# Patient Record
Sex: MALE | Race: WHITE | NOT HISPANIC OR LATINO | Employment: OTHER | URBAN - METROPOLITAN AREA
[De-identification: names, ages, dates, MRNs, and addresses within clinical notes are randomized per-mention and may not be internally consistent; named-entity substitution may affect disease eponyms.]

---

## 2017-03-17 ENCOUNTER — DOCUMENTATION ONLY (OUTPATIENT)
Dept: FAMILY MEDICINE | Facility: CLINIC | Age: 77
End: 2017-03-17

## 2017-03-17 NOTE — PROGRESS NOTES
Pre-Visit Chart Review  For Appointment Scheduled on 3/22/17.    Health Maintenance Due   Topic Date Due    TETANUS VACCINE  03[birth year]04/1958    Pneumococcal (65+) (2 of 2 - PPSV23) 09/29/2016

## 2017-03-22 ENCOUNTER — LAB VISIT (OUTPATIENT)
Dept: LAB | Facility: HOSPITAL | Age: 77
End: 2017-03-22
Attending: FAMILY MEDICINE
Payer: MEDICARE

## 2017-03-22 ENCOUNTER — OFFICE VISIT (OUTPATIENT)
Dept: FAMILY MEDICINE | Facility: CLINIC | Age: 77
End: 2017-03-22
Payer: MEDICARE

## 2017-03-22 VITALS
SYSTOLIC BLOOD PRESSURE: 118 MMHG | TEMPERATURE: 99 F | HEART RATE: 88 BPM | DIASTOLIC BLOOD PRESSURE: 81 MMHG | BODY MASS INDEX: 27.03 KG/M2 | HEIGHT: 66 IN | WEIGHT: 168.19 LBS

## 2017-03-22 DIAGNOSIS — Z23 IMMUNIZATION DUE: ICD-10-CM

## 2017-03-22 DIAGNOSIS — E78.5 HYPERLIPIDEMIA, UNSPECIFIED HYPERLIPIDEMIA TYPE: ICD-10-CM

## 2017-03-22 DIAGNOSIS — E78.2 HYPERLIPIDEMIA, MIXED: ICD-10-CM

## 2017-03-22 DIAGNOSIS — M17.0 PRIMARY OSTEOARTHRITIS OF BOTH KNEES: ICD-10-CM

## 2017-03-22 DIAGNOSIS — I10 ESSENTIAL HYPERTENSION: Primary | ICD-10-CM

## 2017-03-22 LAB
ALBUMIN SERPL BCP-MCNC: 3.9 G/DL
ALP SERPL-CCNC: 52 U/L
ALT SERPL W/O P-5'-P-CCNC: 16 U/L
ANION GAP SERPL CALC-SCNC: 10 MMOL/L
AST SERPL-CCNC: 29 U/L
BILIRUB SERPL-MCNC: 0.7 MG/DL
BUN SERPL-MCNC: 13 MG/DL
CALCIUM SERPL-MCNC: 9.3 MG/DL
CHLORIDE SERPL-SCNC: 109 MMOL/L
CHOLEST/HDLC SERPL: 3.3 {RATIO}
CO2 SERPL-SCNC: 22 MMOL/L
CREAT SERPL-MCNC: 0.9 MG/DL
EST. GFR  (AFRICAN AMERICAN): >60 ML/MIN/1.73 M^2
EST. GFR  (NON AFRICAN AMERICAN): >60 ML/MIN/1.73 M^2
GLUCOSE SERPL-MCNC: 102 MG/DL
HDL/CHOLESTEROL RATIO: 30.4 %
HDLC SERPL-MCNC: 161 MG/DL
HDLC SERPL-MCNC: 49 MG/DL
LDLC SERPL CALC-MCNC: 90.4 MG/DL
NONHDLC SERPL-MCNC: 112 MG/DL
POTASSIUM SERPL-SCNC: 4.2 MMOL/L
PROT SERPL-MCNC: 7.4 G/DL
SODIUM SERPL-SCNC: 141 MMOL/L
TRIGL SERPL-MCNC: 108 MG/DL

## 2017-03-22 PROCEDURE — 1160F RVW MEDS BY RX/DR IN RCRD: CPT | Mod: S$GLB,,, | Performed by: FAMILY MEDICINE

## 2017-03-22 PROCEDURE — 80053 COMPREHEN METABOLIC PANEL: CPT

## 2017-03-22 PROCEDURE — 3079F DIAST BP 80-89 MM HG: CPT | Mod: S$GLB,,, | Performed by: FAMILY MEDICINE

## 2017-03-22 PROCEDURE — 80061 LIPID PANEL: CPT

## 2017-03-22 PROCEDURE — 1159F MED LIST DOCD IN RCRD: CPT | Mod: S$GLB,,, | Performed by: FAMILY MEDICINE

## 2017-03-22 PROCEDURE — 1157F ADVNC CARE PLAN IN RCRD: CPT | Mod: S$GLB,,, | Performed by: FAMILY MEDICINE

## 2017-03-22 PROCEDURE — 99214 OFFICE O/P EST MOD 30 MIN: CPT | Mod: 25,S$GLB,, | Performed by: FAMILY MEDICINE

## 2017-03-22 PROCEDURE — 3074F SYST BP LT 130 MM HG: CPT | Mod: S$GLB,,, | Performed by: FAMILY MEDICINE

## 2017-03-22 PROCEDURE — 90732 PPSV23 VACC 2 YRS+ SUBQ/IM: CPT | Mod: S$GLB,,, | Performed by: FAMILY MEDICINE

## 2017-03-22 PROCEDURE — G0009 ADMIN PNEUMOCOCCAL VACCINE: HCPCS | Mod: S$GLB,,, | Performed by: FAMILY MEDICINE

## 2017-03-22 PROCEDURE — 36415 COLL VENOUS BLD VENIPUNCTURE: CPT | Mod: PO

## 2017-03-22 PROCEDURE — 1125F AMNT PAIN NOTED PAIN PRSNT: CPT | Mod: S$GLB,,, | Performed by: FAMILY MEDICINE

## 2017-03-22 PROCEDURE — 99999 PR PBB SHADOW E&M-EST. PATIENT-LVL III: CPT | Mod: PBBFAC,,, | Performed by: FAMILY MEDICINE

## 2017-03-22 RX ORDER — AMLODIPINE AND BENAZEPRIL HYDROCHLORIDE 5; 20 MG/1; MG/1
1 CAPSULE ORAL DAILY
Qty: 90 CAPSULE | Refills: 3 | Status: SHIPPED | OUTPATIENT
Start: 2017-03-22 | End: 2018-03-26 | Stop reason: SDUPTHER

## 2017-03-22 RX ORDER — SIMVASTATIN 20 MG/1
20 TABLET, FILM COATED ORAL NIGHTLY
Qty: 90 TABLET | Refills: 3 | Status: SHIPPED | OUTPATIENT
Start: 2017-03-22 | End: 2018-03-26 | Stop reason: SDUPTHER

## 2017-03-22 RX ORDER — HYDROCODONE BITARTRATE AND ACETAMINOPHEN 7.5; 325 MG/1; MG/1
TABLET ORAL
Qty: 90 TABLET | Refills: 0 | Status: SHIPPED | OUTPATIENT
Start: 2017-03-22 | End: 2017-03-22 | Stop reason: SDUPTHER

## 2017-03-22 RX ORDER — HYDROCODONE BITARTRATE AND ACETAMINOPHEN 7.5; 325 MG/1; MG/1
TABLET ORAL
Qty: 90 TABLET | Refills: 0 | Status: SHIPPED | OUTPATIENT
Start: 2017-03-22 | End: 2017-09-25 | Stop reason: SDUPTHER

## 2017-03-22 NOTE — PROGRESS NOTES
CHIEF COMPLAINT:  Follow up      HISTORY OF PRESENT ILLNESS:  Tha Guzman is a 77 y.o. male who presents to clinic for follow up    1. HTN: he is on lotrel.  He denies any cough, CP, SOB, edema    2. Hyperlipidemia:  He is on zocor and ASA. He denies any myalgia, dark colored urine.     3. Chronic knee pain/arthritis:  He was followed by Dr. Ramirez and also his previous PCP.  He states that he takes norco 3-4 times a week for severe knee pain.  He has undergone corticosteroid and synvisc/euflexxa injections in the past with no improvement. He requests that the norco be written for 1 tablet PO TID prn as this is cheaper than writing it for 1 tablet 3-4 times a week prn.     4. He previously stated that he had a pneumovax, now he states that he is not sure. He had a prevnar 13    REVIEW OF SYSTEMS:  The patient denies any fever, chills, night sweats, headaches, vision changes, difficulty speaking or swallowing, decreased hearing, weight loss, weight gain, chest pain, palpitations, shortness of breath, cough, nausea, vomiting, abdominal pain, dysuria, diarrhea, constipation, hematuria, hematochezia, melena, changes in his hair, skin, nails, numbness or weakness in his extremities, erythema, swelling  over any of his joints, myalgia, swollen glands, easy bruising, fatigue, edema.      MEDICATIONS:   Reviewed and/or reconciled in EPIC    ALLERGIES:  Reviewed and/or reconciled in [facility]    PAST MEDICAL/SURGICAL HISTORY:   Past Medical History:   Diagnosis Date    Arthritis     Elevated PSA     Essential hypertension, benign     Mixed hyperlipidemia     Prostatitis, unspecified     Wears glasses       Past Surgical History:   Procedure Laterality Date    HEMORRHOID SURGERY      KNEE ARTHROSCOPY W/ MENISCAL REPAIR Left     TONSILLECTOMY         FAMILY HISTORY:    Family History   Problem Relation Age of Onset    Cancer Brother      lunga cancer    Cancer Paternal Uncle      prostate cancer    Melanoma Neg Hx      Psoriasis Neg Hx     Lupus Neg Hx     Eczema Neg Hx        SOCIAL HISTORY:    Social History     Social History    Marital status:      Spouse name: N/A    Number of children: N/A    Years of education: N/A     Occupational History    Not on file.     Social History Main Topics    Smoking status: Former Smoker     Packs/day: 1.00     Years: 40.00     Types: Cigarettes     Quit date: 7/18/2007    Smokeless tobacco: Never Used    Alcohol use 3.0 oz/week     6 Standard drinks or equivalent per week      Comment: OCCASSIONALLY    Drug use: No    Sexual activity: Not Currently     Other Topics Concern    Not on file     Social History Narrative       PHYSICAL EXAM:  VITAL SIGNS:   There were no vitals filed for this visit.  GENERAL:  Patient appears well nourished, sitting on exam table, in no acute distress.  HEENT:  Atraumatic, normocephalic, PERRLA, EOMI, no conjunctival injection, sclerae are anicteric, normal external auditory canals,TMs clear b/l, gross hearing intact to whisper, MMM, no oropharygneal erythema or exudate.  NECK:  Supple, normal ROM, trachea is midline , no supraclavicular or cervical LAD or masses palpated.  Thyroid gland not palpable.  CARDIOVASCULAR:  RRR, normal S1 and S2, no m/r/g.  RESPIRATORY:  CTA b/l, no wheezes, rhonchi, rales.  No increased work of breathing, no  use of accessory muscles.  ABDOMEN:  Soft, nontender, nondistended, normoactive bowel sounds in all four quadrants, no rebound or guarding, no HSM or masses palpated.  Normal percussion.  EXTREMITIES:  2+ DP pulses b/l, no edema.  SKIN:  Warm, no lesions on exposed skin.  NEUROMUSCULAR:  Cranial nerves II-XII grossly intact.   No clubbing or cyanosis of digits/nails.  Steady gait.  PSYCH:  Patient is alert and oriented to person, time, place. They are appropriately dressed and groomed. There is normal eye contact. Rate and tone of speech is normal. Normal insight, judgement. Normal thought content and  process.         ASSESSMENT/PLAN: This is a 77 y.o. male who presents to clinic for evaluation of the following concerns    1. HTN: see below  2. Hyperlipidemia: CMP, lipid panel  3.Knee DJD: norco refilled, the risks/side effects of this medication were discussed with him.  He was counseled to try to wean his use of this medication, can consider referral to physical medicine for cooled radiofrequency ablation. Prescription written for 1 tablet PO TID prn, however patient takes 3-4 a week as needed.   4. Immunization due: will receive pneumovax in clinic today    Patient readiness: acceptance and barriers:none    During the course of the visit the patient was educated and counseled about the following:     Hypertension:   Medication: no change.    Goals: Hypertension: Reduce Blood Pressure    Did patient meet goals/outcomes: Yes    The following self management tools provided: declined    Patient Instructions (the written plan) was given to the patient/family.     Time spent with patient: 30 minutes        Mindi Mar MD

## 2017-04-10 ENCOUNTER — PATIENT MESSAGE (OUTPATIENT)
Dept: UROLOGY | Facility: CLINIC | Age: 77
End: 2017-04-10

## 2017-04-10 DIAGNOSIS — R31.9 BLOOD IN URINE: Primary | ICD-10-CM

## 2017-04-26 ENCOUNTER — LAB VISIT (OUTPATIENT)
Dept: LAB | Facility: HOSPITAL | Age: 77
End: 2017-04-26
Attending: UROLOGY
Payer: MEDICARE

## 2017-04-26 DIAGNOSIS — R31.9 BLOOD IN URINE: ICD-10-CM

## 2017-04-26 LAB — COMPLEXED PSA SERPL-MCNC: 4 NG/ML

## 2017-04-26 PROCEDURE — 84153 ASSAY OF PSA TOTAL: CPT

## 2017-04-26 PROCEDURE — 36415 COLL VENOUS BLD VENIPUNCTURE: CPT | Mod: PO

## 2017-05-02 ENCOUNTER — APPOINTMENT (OUTPATIENT)
Dept: LAB | Facility: HOSPITAL | Age: 77
End: 2017-05-02
Attending: UROLOGY
Payer: MEDICARE

## 2017-05-02 ENCOUNTER — PATIENT MESSAGE (OUTPATIENT)
Dept: UROLOGY | Facility: CLINIC | Age: 77
End: 2017-05-02

## 2017-05-02 ENCOUNTER — OFFICE VISIT (OUTPATIENT)
Dept: UROLOGY | Facility: CLINIC | Age: 77
End: 2017-05-02
Payer: MEDICARE

## 2017-05-02 VITALS
WEIGHT: 170 LBS | DIASTOLIC BLOOD PRESSURE: 80 MMHG | BODY MASS INDEX: 30.12 KG/M2 | SYSTOLIC BLOOD PRESSURE: 140 MMHG | RESPIRATION RATE: 18 BRPM | TEMPERATURE: 98 F | HEIGHT: 63 IN | HEART RATE: 99 BPM

## 2017-05-02 DIAGNOSIS — R31.29 MICROSCOPIC HEMATURIA: Primary | ICD-10-CM

## 2017-05-02 DIAGNOSIS — R97.20 ELEVATED PROSTATE SPECIFIC ANTIGEN (PSA): ICD-10-CM

## 2017-05-02 DIAGNOSIS — N40.0 BENIGN NON-NODULAR PROSTATIC HYPERPLASIA, PRESENCE OF LOWER URINARY TRACT SYMPTOMS UNSPECIFIED: ICD-10-CM

## 2017-05-02 PROCEDURE — 88112 CYTOPATH CELL ENHANCE TECH: CPT | Performed by: PATHOLOGY

## 2017-05-02 PROCEDURE — 1159F MED LIST DOCD IN RCRD: CPT | Mod: S$GLB,,, | Performed by: UROLOGY

## 2017-05-02 PROCEDURE — 99999 PR PBB SHADOW E&M-EST. PATIENT-LVL III: CPT | Mod: PBBFAC,,, | Performed by: UROLOGY

## 2017-05-02 PROCEDURE — 87086 URINE CULTURE/COLONY COUNT: CPT

## 2017-05-02 PROCEDURE — 3079F DIAST BP 80-89 MM HG: CPT | Mod: S$GLB,,, | Performed by: UROLOGY

## 2017-05-02 PROCEDURE — 3077F SYST BP >= 140 MM HG: CPT | Mod: S$GLB,,, | Performed by: UROLOGY

## 2017-05-02 PROCEDURE — 1160F RVW MEDS BY RX/DR IN RCRD: CPT | Mod: S$GLB,,, | Performed by: UROLOGY

## 2017-05-02 PROCEDURE — 1126F AMNT PAIN NOTED NONE PRSNT: CPT | Mod: S$GLB,,, | Performed by: UROLOGY

## 2017-05-02 PROCEDURE — 81002 URINALYSIS NONAUTO W/O SCOPE: CPT | Mod: S$GLB,,, | Performed by: UROLOGY

## 2017-05-02 PROCEDURE — 88112 CYTOPATH CELL ENHANCE TECH: CPT | Mod: 26,,, | Performed by: PATHOLOGY

## 2017-05-02 PROCEDURE — 99214 OFFICE O/P EST MOD 30 MIN: CPT | Mod: 25,S$GLB,, | Performed by: UROLOGY

## 2017-05-02 NOTE — PROGRESS NOTES
OFFICE NOTE    CHIEF COMPLAINT:  BPH, microscopic hematuria.    HISTORY OF PRESENT ILLNESS:  This 77-year-old male returns for a routine   recheck.  He has a history of BPH for which he continues to take Flomax 0.4 mg   p.o. daily with which he continues to do well.  He recently had taken Cipro for   prostatitis that appears to have resolved.  He also has a history of microscopic   hematuria for which he has undergone a full workup that includes cystoscopy and   bilateral retrograde pyelograms in April 2016, and the only finding was that of   trilobar prostatic hyperplasia and bladder calculi and no evidence of any   bladder lesions.  It must also be mentioned he was recently noted to have a rise   in his PSA to 4.0 on 04/26/2017, when compared to 2.9 a year ago.    MEDICAL HISTORY UPDATE:  Reveals no other change in his general health since his   last visit of 10/18/2016.    PHYSICAL EXAMINATION:  ABDOMEN:  Soft, benign and nontender.  No masses.  No hernias or organomegaly.  EXTERNAL GENITALIA:  Normal phallus with adequate meatus.  Testes descended and   feel normal.  No scrotal masses.  RECTAL:  With 25 to 30 g smooth prostate.  No nodules.  Normal sphincter tone.    UA reveals 1+ blood, moderate number of rbcs and pH 6.0.    FINAL IMPRESSION:  Recurrent microscopic hematuria, BPH, rising PSA.    RECOMMENDATIONS:  Urine for C and S and cytology.  Otherwise, recheck PSA in six   months pending the urine culture and cytology findings for which we will   contact the patient with the results.      BREEZY  dd: 05/02/2017 18:12:47 (CDT)  td: 05/03/2017 07:18:22 (MAILE)  Doc ID   #[id number]  Job ID #[id number]    CC:

## 2017-05-04 ENCOUNTER — PATIENT MESSAGE (OUTPATIENT)
Dept: UROLOGY | Facility: CLINIC | Age: 77
End: 2017-05-04

## 2017-05-04 LAB — BACTERIA UR CULT: NO GROWTH

## 2017-05-05 DIAGNOSIS — N41.1 CHRONIC PROSTATITIS: Primary | ICD-10-CM

## 2017-05-06 RX ORDER — CIPROFLOXACIN 500 MG/1
500 TABLET ORAL 2 TIMES DAILY
Qty: 20 TABLET | Refills: 0 | Status: SHIPPED | OUTPATIENT
Start: 2017-05-06 | End: 2017-05-16

## 2017-06-21 RX ORDER — TAMSULOSIN HYDROCHLORIDE 0.4 MG/1
0.4 CAPSULE ORAL DAILY
Qty: 60 CAPSULE | Refills: 10 | Status: SHIPPED | OUTPATIENT
Start: 2017-06-21 | End: 2018-06-08 | Stop reason: SDUPTHER

## 2017-06-22 RX ORDER — TAMSULOSIN HYDROCHLORIDE 0.4 MG/1
0.4 CAPSULE ORAL DAILY
Qty: 60 CAPSULE | Refills: 10 | Status: CANCELLED | OUTPATIENT
Start: 2017-06-22

## 2017-06-28 RX ORDER — TAMSULOSIN HYDROCHLORIDE 0.4 MG/1
0.4 CAPSULE ORAL DAILY
Qty: 90 CAPSULE | Refills: 0 | Status: CANCELLED | OUTPATIENT
Start: 2017-06-28 | End: 2017-09-26

## 2017-06-28 NOTE — TELEPHONE ENCOUNTER
----- Message from Pau Wilkinson sent at 6/28/2017 11:00 AM CDT -----  Contact: Astrid  Pharmacy called retarding the patient's request for refill of medication. Please contact Astrid    tamsulosin (FLOMAX) 0.4 mg Cp24      [facility]/pharmacy #[id number] - [place] - [address]  [address] [address]  Phone: [phone] Fax: [phone]

## 2017-06-28 NOTE — TELEPHONE ENCOUNTER
Spoke with Astrid with [facility] pharmacy and informed her that the prescription for Flomax was already sent on 6/21/17 for flomax. verbalized understanding

## 2017-07-20 ENCOUNTER — TELEPHONE (OUTPATIENT)
Dept: UROLOGY | Facility: CLINIC | Age: 77
End: 2017-07-20

## 2017-07-20 ENCOUNTER — PATIENT MESSAGE (OUTPATIENT)
Dept: UROLOGY | Facility: CLINIC | Age: 77
End: 2017-07-20

## 2017-07-20 NOTE — TELEPHONE ENCOUNTER
----- Message from Isael Nova sent at 7/20/2017  1:27 PM CDT -----  Contact: RADHA/Margaret Robles called in regarding the attached patient and his Rx for Tamsulosin and needs some clarification on directions.  Patient is questioning the dosage that is why they need clarification.      [facility]/pharmacy #[id number] - [place] - [address] [address]  Phone: [phone] Fax: [phone]

## 2017-09-11 ENCOUNTER — DOCUMENTATION ONLY (OUTPATIENT)
Dept: FAMILY MEDICINE | Facility: CLINIC | Age: 77
End: 2017-09-11

## 2017-09-11 NOTE — PROGRESS NOTES
Pre-Visit Chart Review  For Appointment Scheduled on 9/25/17.    Health Maintenance Due   Topic Date Due    TETANUS VACCINE  03/04/1958    Zoster Vaccine  03/04/2000    Influenza Vaccine  08/01/2017

## 2017-09-25 ENCOUNTER — OFFICE VISIT (OUTPATIENT)
Dept: FAMILY MEDICINE | Facility: CLINIC | Age: 77
End: 2017-09-25
Payer: MEDICARE

## 2017-09-25 ENCOUNTER — LAB VISIT (OUTPATIENT)
Dept: LAB | Facility: HOSPITAL | Age: 77
End: 2017-09-25
Attending: FAMILY MEDICINE
Payer: MEDICARE

## 2017-09-25 VITALS
WEIGHT: 165.81 LBS | SYSTOLIC BLOOD PRESSURE: 130 MMHG | TEMPERATURE: 99 F | BODY MASS INDEX: 29.38 KG/M2 | DIASTOLIC BLOOD PRESSURE: 89 MMHG | HEIGHT: 63 IN | HEART RATE: 94 BPM

## 2017-09-25 DIAGNOSIS — E78.5 HYPERLIPIDEMIA, UNSPECIFIED HYPERLIPIDEMIA TYPE: ICD-10-CM

## 2017-09-25 DIAGNOSIS — I10 ESSENTIAL HYPERTENSION: ICD-10-CM

## 2017-09-25 DIAGNOSIS — M17.0 PRIMARY OSTEOARTHRITIS OF BOTH KNEES: ICD-10-CM

## 2017-09-25 DIAGNOSIS — I10 ESSENTIAL HYPERTENSION: Primary | ICD-10-CM

## 2017-09-25 LAB
ALBUMIN SERPL BCP-MCNC: 3.9 G/DL
ALP SERPL-CCNC: 54 U/L
ALT SERPL W/O P-5'-P-CCNC: 15 U/L
ANION GAP SERPL CALC-SCNC: 8 MMOL/L
AST SERPL-CCNC: 22 U/L
BASOPHILS # BLD AUTO: 0.02 K/UL
BASOPHILS NFR BLD: 0.3 %
BILIRUB SERPL-MCNC: 1.1 MG/DL
BUN SERPL-MCNC: 13 MG/DL
CALCIUM SERPL-MCNC: 9.4 MG/DL
CHLORIDE SERPL-SCNC: 108 MMOL/L
CHOLEST SERPL-MCNC: 145 MG/DL
CHOLEST/HDLC SERPL: 3.5 {RATIO}
CO2 SERPL-SCNC: 24 MMOL/L
CREAT SERPL-MCNC: 0.8 MG/DL
DIFFERENTIAL METHOD: ABNORMAL
EOSINOPHIL # BLD AUTO: 0.2 K/UL
EOSINOPHIL NFR BLD: 3.5 %
ERYTHROCYTE [DISTWIDTH] IN BLOOD BY AUTOMATED COUNT: 12.9 %
EST. GFR  (AFRICAN AMERICAN): >60 ML/MIN/1.73 M^2
EST. GFR  (NON AFRICAN AMERICAN): >60 ML/MIN/1.73 M^2
GLUCOSE SERPL-MCNC: 98 MG/DL
HCT VFR BLD AUTO: 43.7 %
HDLC SERPL-MCNC: 41 MG/DL
HDLC SERPL: 28.3 %
HGB BLD-MCNC: 14.9 G/DL
LDLC SERPL CALC-MCNC: 77.4 MG/DL
LYMPHOCYTES # BLD AUTO: 1.3 K/UL
LYMPHOCYTES NFR BLD: 20.6 %
MCH RBC QN AUTO: 31.1 PG
MCHC RBC AUTO-ENTMCNC: 34.1 G/DL
MCV RBC AUTO: 91 FL
MONOCYTES # BLD AUTO: 0.7 K/UL
MONOCYTES NFR BLD: 10.6 %
NEUTROPHILS # BLD AUTO: 4.1 K/UL
NEUTROPHILS NFR BLD: 64.7 %
NONHDLC SERPL-MCNC: 104 MG/DL
PLATELET # BLD AUTO: 206 K/UL
PMV BLD AUTO: 9 FL
POTASSIUM SERPL-SCNC: 4.2 MMOL/L
PROT SERPL-MCNC: 7.4 G/DL
RBC # BLD AUTO: 4.79 M/UL
SODIUM SERPL-SCNC: 140 MMOL/L
TRIGL SERPL-MCNC: 133 MG/DL
TSH SERPL DL<=0.005 MIU/L-ACNC: 1.29 UIU/ML
WBC # BLD AUTO: 6.32 K/UL

## 2017-09-25 PROCEDURE — 1126F AMNT PAIN NOTED NONE PRSNT: CPT | Mod: S$GLB,,, | Performed by: FAMILY MEDICINE

## 2017-09-25 PROCEDURE — 3008F BODY MASS INDEX DOCD: CPT | Mod: S$GLB,,, | Performed by: FAMILY MEDICINE

## 2017-09-25 PROCEDURE — 84443 ASSAY THYROID STIM HORMONE: CPT

## 2017-09-25 PROCEDURE — 80053 COMPREHEN METABOLIC PANEL: CPT

## 2017-09-25 PROCEDURE — 85025 COMPLETE CBC W/AUTO DIFF WBC: CPT

## 2017-09-25 PROCEDURE — 80061 LIPID PANEL: CPT

## 2017-09-25 PROCEDURE — 99214 OFFICE O/P EST MOD 30 MIN: CPT | Mod: S$GLB,,, | Performed by: FAMILY MEDICINE

## 2017-09-25 PROCEDURE — 1159F MED LIST DOCD IN RCRD: CPT | Mod: S$GLB,,, | Performed by: FAMILY MEDICINE

## 2017-09-25 PROCEDURE — 3075F SYST BP GE 130 - 139MM HG: CPT | Mod: S$GLB,,, | Performed by: FAMILY MEDICINE

## 2017-09-25 PROCEDURE — 3079F DIAST BP 80-89 MM HG: CPT | Mod: S$GLB,,, | Performed by: FAMILY MEDICINE

## 2017-09-25 PROCEDURE — 99999 PR PBB SHADOW E&M-EST. PATIENT-LVL III: CPT | Mod: PBBFAC,,, | Performed by: FAMILY MEDICINE

## 2017-09-25 PROCEDURE — 36415 COLL VENOUS BLD VENIPUNCTURE: CPT | Mod: PO

## 2017-09-25 RX ORDER — HYDROCODONE BITARTRATE AND ACETAMINOPHEN 7.5; 325 MG/1; MG/1
TABLET ORAL
Qty: 90 TABLET | Refills: 0 | Status: SHIPPED | OUTPATIENT
Start: 2017-09-25 | End: 2018-03-26 | Stop reason: SDUPTHER

## 2017-09-25 NOTE — PATIENT INSTRUCTIONS
[email or web address]  website for weight loss    Dr. Gaetano Rene-physical medicine and rehab, does newer knee injections and cool radiofrequency ablation.

## 2017-09-25 NOTE — PROGRESS NOTES
CHIEF COMPLAINT:  Follow up      HISTORY OF PRESENT ILLNESS:  Tha Guzman is a 77 y.o. male who presents to clinic for follow up    1. HTN: He is on lotrel.  He denies any cough, CP, SOB, edema    2. Hyperlipidemia:  He is on zocor and ASA. He denies any myalgia, dark colored urine.     3. Chronic knee pain/arthritis:  He was followed by Dr. Ramirez and also his previous PCP.  He states that he takes norco 3-4 times a week for severe knee pain.  He has undergone corticosteroid and synvisc/euflexxa injections in the past with no improvement. He requests that the norco be written for 1 tablet PO TID prn as this is cheaper than writing it for 1 tablet 3-4 times a week prn.         REVIEW OF SYSTEMS:  The patient denies any fever, chills, night sweats, headaches, vision changes, difficulty speaking or swallowing, decreased hearing, weight loss, weight gain, chest pain, palpitations, shortness of breath, cough, nausea, vomiting, abdominal pain, dysuria, diarrhea, constipation, hematuria, hematochezia, melena, changes in his hair, skin, nails, numbness or weakness in his extremities, erythema, swelling  over any of his joints, myalgia, swollen glands, easy bruising, fatigue, edema.      MEDICATIONS:   Reviewed and/or reconciled in EPIC    ALLERGIES:  Reviewed and/or reconciled in [facility]    PAST MEDICAL/SURGICAL HISTORY:   Past Medical History:   Diagnosis Date    Arthritis     Elevated PSA     Essential hypertension, benign     Mixed hyperlipidemia     Prostatitis, unspecified     Wears glasses       Past Surgical History:   Procedure Laterality Date    HEMORRHOID SURGERY      KNEE ARTHROSCOPY W/ MENISCAL REPAIR Left     TONSILLECTOMY         FAMILY HISTORY:    Family History   Problem Relation Age of Onset    Cancer Brother      lunga cancer    Cancer Paternal Uncle      prostate cancer    Melanoma Neg Hx     Psoriasis Neg Hx     Lupus Neg Hx     Eczema Neg Hx        SOCIAL HISTORY:    Social History  "    Social History    Marital status:      Spouse name: N/A    Number of children: N/A    Years of education: N/A     Occupational History    Not on file.     Social History Main Topics    Smoking status: Former Smoker     Packs/day: 1.00     Years: 40.00     Types: Cigarettes     Quit date: 7/18/2007    Smokeless tobacco: Never Used    Alcohol use 3.0 oz/week     6 Standard drinks or equivalent per week      Comment: OCCASSIONALLY    Drug use: No    Sexual activity: Not Currently     Other Topics Concern    Not on file     Social History Narrative    No narrative on file       PHYSICAL EXAM:  VITAL SIGNS:   Vitals:    09/25/17 0753   BP: 130/89   BP Location: Right arm   Patient Position: Sitting   BP Method: Medium (Automatic)   Pulse: 94   Temp: 99 °F (37.2 °C)   TempSrc: Oral   Weight: 75.2 kg (165 lb 12.6 oz)   Height: 5' 3" (1.6 m)     GENERAL:  Patient appears well nourished, sitting on exam table, in no acute distress.  HEENT:  Atraumatic, normocephalic, PERRLA, EOMI, no conjunctival injection, sclerae are anicteric, normal external auditory canals,TMs clear b/l, gross hearing intact to whisper, MMM, no oropharygneal erythema or exudate.  NECK:  Supple, normal ROM, trachea is midline , no supraclavicular or cervical LAD or masses palpated.  Thyroid gland not palpable.  CARDIOVASCULAR:  RRR, normal S1 and S2, no m/r/g.  RESPIRATORY:  CTA b/l, no wheezes, rhonchi, rales.  No increased work of breathing, no  use of accessory muscles.  ABDOMEN:  Soft, nontender, nondistended, normoactive bowel sounds in all four quadrants, no rebound or guarding, no HSM or masses palpated.  Normal percussion.  EXTREMITIES:  2+ DP pulses b/l, no edema.  SKIN:  Warm, no lesions on exposed skin.  NEUROMUSCULAR:  Cranial nerves II-XII grossly intact.   No clubbing or cyanosis of digits/nails.  Steady gait.  PSYCH:  Patient is alert and oriented to person, time, place. They are appropriately dressed and groomed. " There is normal eye contact. Rate and tone of speech is normal. Normal insight, judgement. Normal thought content and process.         ASSESSMENT/PLAN: This is a 77 y.o. male who presents to clinic for evaluation of the following concerns    1. HTN: see below  2. Hyperlipidemia: CMP, lipid panel, CBC, TSH  3. Chronic knee pain: norco refilled. The risks/side effects of this medication were discussed with him. He will let us know if he wants to meet with Dr. Rene to discuss nonsurgical options.  4. He will receive an Adacel and zostavax at his local pharmacy. He receives his influenza vaccine through his employer.     Patient readiness: acceptance and barriers:none    During the course of the visit the patient was educated and counseled about the following:     Hypertension:   Medication: no change.    Goals: Hypertension: Reduce Blood Pressure    Did patient meet goals/outcomes: Yes    The following self management tools provided: declined    Patient Instructions (the written plan) was given to the patient/family.     Time spent with patient: 30 minutes        Mindi Mar MD

## 2017-10-02 ENCOUNTER — PATIENT MESSAGE (OUTPATIENT)
Dept: UROLOGY | Facility: CLINIC | Age: 77
End: 2017-10-02

## 2017-11-02 ENCOUNTER — LAB VISIT (OUTPATIENT)
Dept: LAB | Facility: HOSPITAL | Age: 77
End: 2017-11-02
Attending: UROLOGY
Payer: MEDICARE

## 2017-11-02 DIAGNOSIS — R97.20 ELEVATED PROSTATE SPECIFIC ANTIGEN (PSA): ICD-10-CM

## 2017-11-02 DIAGNOSIS — R97.20 ELEVATED PROSTATE SPECIFIC ANTIGEN (PSA): Primary | ICD-10-CM

## 2017-11-02 LAB — COMPLEXED PSA SERPL-MCNC: 3.9 NG/ML

## 2017-11-02 PROCEDURE — 84153 ASSAY OF PSA TOTAL: CPT

## 2017-11-02 PROCEDURE — 36415 COLL VENOUS BLD VENIPUNCTURE: CPT | Mod: PO

## 2017-11-07 ENCOUNTER — APPOINTMENT (OUTPATIENT)
Dept: LAB | Facility: HOSPITAL | Age: 77
End: 2017-11-07
Attending: UROLOGY
Payer: MEDICARE

## 2017-11-07 ENCOUNTER — OFFICE VISIT (OUTPATIENT)
Dept: UROLOGY | Facility: CLINIC | Age: 77
End: 2017-11-07
Payer: MEDICARE

## 2017-11-07 VITALS
DIASTOLIC BLOOD PRESSURE: 90 MMHG | WEIGHT: 167 LBS | HEIGHT: 63 IN | HEART RATE: 78 BPM | TEMPERATURE: 98 F | SYSTOLIC BLOOD PRESSURE: 135 MMHG | BODY MASS INDEX: 29.59 KG/M2

## 2017-11-07 DIAGNOSIS — R30.0 DYSURIA: Primary | ICD-10-CM

## 2017-11-07 DIAGNOSIS — N41.9 PROSTATITIS, UNSPECIFIED PROSTATITIS TYPE: ICD-10-CM

## 2017-11-07 DIAGNOSIS — R31.29 MICROSCOPIC HEMATURIA: ICD-10-CM

## 2017-11-07 DIAGNOSIS — N40.0 BENIGN PROSTATIC HYPERPLASIA, UNSPECIFIED WHETHER LOWER URINARY TRACT SYMPTOMS PRESENT: ICD-10-CM

## 2017-11-07 LAB
BILIRUB SERPL-MCNC: ABNORMAL MG/DL
BLOOD URINE, POC: ABNORMAL
COLOR, POC UA: ABNORMAL
GLUCOSE UR QL STRIP: ABNORMAL
KETONES UR QL STRIP: ABNORMAL
LEUKOCYTE ESTERASE URINE, POC: ABNORMAL
NITRITE, POC UA: ABNORMAL
PH, POC UA: 5
PROTEIN, POC: ABNORMAL
SPECIFIC GRAVITY, POC UA: 1.01
UROBILINOGEN, POC UA: ABNORMAL

## 2017-11-07 PROCEDURE — 88112 CYTOPATH CELL ENHANCE TECH: CPT | Performed by: PATHOLOGY

## 2017-11-07 PROCEDURE — 99999 PR PBB SHADOW E&M-EST. PATIENT-LVL III: CPT | Mod: PBBFAC,,, | Performed by: UROLOGY

## 2017-11-07 PROCEDURE — 99214 OFFICE O/P EST MOD 30 MIN: CPT | Mod: 25,S$GLB,, | Performed by: UROLOGY

## 2017-11-07 PROCEDURE — 87086 URINE CULTURE/COLONY COUNT: CPT

## 2017-11-07 PROCEDURE — 81002 URINALYSIS NONAUTO W/O SCOPE: CPT | Mod: S$GLB,,, | Performed by: UROLOGY

## 2017-11-07 PROCEDURE — 88112 CYTOPATH CELL ENHANCE TECH: CPT | Mod: 26,,, | Performed by: PATHOLOGY

## 2017-11-07 RX ORDER — CIPROFLOXACIN 500 MG/1
500 TABLET ORAL 2 TIMES DAILY
Qty: 30 TABLET | Refills: 2 | Status: SHIPPED | OUTPATIENT
Start: 2017-11-07 | End: 2019-04-23 | Stop reason: ALTCHOICE

## 2017-11-07 NOTE — PROGRESS NOTES
CHIEF COMPLAINT:  BPH, lower urinary tract symptoms, microscopic hematuria, and   history of rising PSA.    HISTORY OF PRESENT ILLNESS:  This 77-year-old male returns for routine recheck.    He has a history of microscopic hematuria for which he underwent a full workup   last year that included cystoscopy and retrograde pyelograms on April 2016,   which revealed evidence of BPH and bladder calculi.  Otherwise, no evidence of   any bladder lesions and no other significant findings.  He also has a history of   BPH, for which he continues to use Flomax 0.4 mg p.o. daily with which he   continues to do well.  Recently he has noticed a slight dysuria, but this   started this morning.  He does have a history of prostatitis.    MEDICAL HISTORY UPDATE:  Reveals no other change in general health since his   last visit.    PHYSICAL EXAMINATION:  ABDOMEN:  Soft, benign, and nontender.  No masses.  No hernias or organomegaly.  EXTERNAL GENITAL:  Normal phallus with adequate meatus.  Testes descended and   feel normal.  No scrotal masses.  RECTAL:  A 25 to 30 g smooth prostate.  No nodules.  Normal sphincter tone.    His most recent PSA was 3.9 on 11/02/2017 that compares to 4.0 six months prior.  He has had a history of a rising PSA in the past.    UA today does reveal a trace of blood, few rbc's, and pH 5.0.    FINAL IMPRESSION:  BPH, prostatitis, microscopic hematuria.    RECOMMENDATIONS:  Urine for C and S and cytology.  Cipro 500 mg p.o. b.i.d. and   we will contact the patient with the urine test results.      MOSHE  dd: 11/07/2017 17:40:29 (CST)  td: 11/08/2017 05:19:13 (CST)  Doc ID   #[id number]  Job ID #[id number]    CC:

## 2017-11-08 LAB — BACTERIA UR CULT: NO GROWTH

## 2018-03-21 ENCOUNTER — DOCUMENTATION ONLY (OUTPATIENT)
Dept: FAMILY MEDICINE | Facility: CLINIC | Age: 78
End: 2018-03-21

## 2018-03-21 NOTE — PROGRESS NOTES
Pre-Visit Chart Review  For Appointment Scheduled on 3/26/18.    Health Maintenance Due   Topic Date Due    TETANUS VACCINE  03[birth year]04/1958    Zoster Vaccine  03/04/2000    Influenza Vaccine  08/01/2017

## 2018-03-26 ENCOUNTER — OFFICE VISIT (OUTPATIENT)
Dept: FAMILY MEDICINE | Facility: CLINIC | Age: 78
End: 2018-03-26
Payer: MEDICARE

## 2018-03-26 ENCOUNTER — LAB VISIT (OUTPATIENT)
Dept: LAB | Facility: HOSPITAL | Age: 78
End: 2018-03-26
Attending: FAMILY MEDICINE
Payer: MEDICARE

## 2018-03-26 VITALS
TEMPERATURE: 99 F | HEIGHT: 63 IN | WEIGHT: 170.19 LBS | DIASTOLIC BLOOD PRESSURE: 70 MMHG | SYSTOLIC BLOOD PRESSURE: 122 MMHG | HEART RATE: 74 BPM | BODY MASS INDEX: 30.16 KG/M2

## 2018-03-26 DIAGNOSIS — E78.5 HYPERLIPIDEMIA, UNSPECIFIED HYPERLIPIDEMIA TYPE: ICD-10-CM

## 2018-03-26 DIAGNOSIS — I10 ESSENTIAL HYPERTENSION: Primary | ICD-10-CM

## 2018-03-26 DIAGNOSIS — M17.0 PRIMARY OSTEOARTHRITIS OF BOTH KNEES: ICD-10-CM

## 2018-03-26 DIAGNOSIS — E66.9 OBESITY, CLASS I, BMI 30-34.9: ICD-10-CM

## 2018-03-26 PROBLEM — H26.9 CATARACTS, BILATERAL: Status: ACTIVE | Noted: 2018-03-26

## 2018-03-26 PROBLEM — H35.30 MACULAR DEGENERATION: Status: ACTIVE | Noted: 2018-03-26

## 2018-03-26 LAB
ALBUMIN SERPL BCP-MCNC: 4 G/DL
ALP SERPL-CCNC: 48 U/L
ALT SERPL W/O P-5'-P-CCNC: 17 U/L
ANION GAP SERPL CALC-SCNC: 8 MMOL/L
AST SERPL-CCNC: 26 U/L
BILIRUB SERPL-MCNC: 0.6 MG/DL
BUN SERPL-MCNC: 17 MG/DL
CALCIUM SERPL-MCNC: 9.3 MG/DL
CHLORIDE SERPL-SCNC: 107 MMOL/L
CHOLEST SERPL-MCNC: 142 MG/DL
CHOLEST/HDLC SERPL: 3.2 {RATIO}
CO2 SERPL-SCNC: 25 MMOL/L
CREAT SERPL-MCNC: 0.8 MG/DL
EST. GFR  (AFRICAN AMERICAN): >60 ML/MIN/1.73 M^2
EST. GFR  (NON AFRICAN AMERICAN): >60 ML/MIN/1.73 M^2
GLUCOSE SERPL-MCNC: 95 MG/DL
HDLC SERPL-MCNC: 45 MG/DL
HDLC SERPL: 31.7 %
LDLC SERPL CALC-MCNC: 81 MG/DL
NONHDLC SERPL-MCNC: 97 MG/DL
POTASSIUM SERPL-SCNC: 4.3 MMOL/L
PROT SERPL-MCNC: 7.2 G/DL
SODIUM SERPL-SCNC: 140 MMOL/L
TRIGL SERPL-MCNC: 80 MG/DL

## 2018-03-26 PROCEDURE — 80061 LIPID PANEL: CPT

## 2018-03-26 PROCEDURE — 99214 OFFICE O/P EST MOD 30 MIN: CPT | Mod: S$GLB,,, | Performed by: FAMILY MEDICINE

## 2018-03-26 PROCEDURE — 36415 COLL VENOUS BLD VENIPUNCTURE: CPT | Mod: PO

## 2018-03-26 PROCEDURE — 80053 COMPREHEN METABOLIC PANEL: CPT

## 2018-03-26 PROCEDURE — 3074F SYST BP LT 130 MM HG: CPT | Mod: CPTII,S$GLB,, | Performed by: FAMILY MEDICINE

## 2018-03-26 PROCEDURE — 3078F DIAST BP <80 MM HG: CPT | Mod: CPTII,S$GLB,, | Performed by: FAMILY MEDICINE

## 2018-03-26 PROCEDURE — 99999 PR PBB SHADOW E&M-EST. PATIENT-LVL III: CPT | Mod: PBBFAC,,, | Performed by: FAMILY MEDICINE

## 2018-03-26 RX ORDER — HYDROCODONE BITARTRATE AND ACETAMINOPHEN 7.5; 325 MG/1; MG/1
TABLET ORAL
Qty: 90 TABLET | Refills: 0 | Status: SHIPPED | OUTPATIENT
Start: 2018-03-26 | End: 2018-10-04 | Stop reason: SDUPTHER

## 2018-03-26 RX ORDER — AMLODIPINE AND BENAZEPRIL HYDROCHLORIDE 5; 20 MG/1; MG/1
1 CAPSULE ORAL DAILY
Qty: 90 CAPSULE | Refills: 3 | Status: SHIPPED | OUTPATIENT
Start: 2018-03-26 | End: 2018-04-24 | Stop reason: SDUPTHER

## 2018-03-26 RX ORDER — SIMVASTATIN 20 MG/1
20 TABLET, FILM COATED ORAL NIGHTLY
Qty: 90 TABLET | Refills: 3 | Status: SHIPPED | OUTPATIENT
Start: 2018-03-26 | End: 2018-04-24 | Stop reason: SDUPTHER

## 2018-03-26 NOTE — PROGRESS NOTES
CHIEF COMPLAINT:  Follow up      HISTORY OF PRESENT ILLNESS:  Tha Guzman is a 78 y.o. male who presents to clinic for follow up    1. HTN: He is on lotrel.  He denies any cough, CP, SOB, edema    2. Hyperlipidemia:  He is on zocor and ASA. He denies any myalgia, dark colored urine.  He is due for lab work.     3. Chronic knee pain/arthritis:  He was followed by Dr. Ramirez and also his previous PCP.  He states that he takes norco 3-4 times a week for severe knee pain.  He has undergone corticosteroid and synvisc/euflexxa injections in the past with no improvement. He requests that the norco be written for 1 tablet PO TID prn as this is cheaper than writing it for 1 tablet 3-4 times a week prn. He is staying very active.         REVIEW OF SYSTEMS:  The patient denies any fever, chills, night sweats, headaches, vision changes, difficulty speaking or swallowing, decreased hearing, weight loss, weight gain, chest pain, palpitations, shortness of breath, cough, nausea, vomiting, abdominal pain, dysuria, diarrhea, constipation, hematuria, hematochezia, melena, changes in his hair, skin, nails, numbness or weakness in his extremities, erythema, swelling  over any of his joints, myalgia, swollen glands, easy bruising, fatigue, edema.      MEDICATIONS:   Reviewed and/or reconciled in EPIC    ALLERGIES:  Reviewed and/or reconciled in [facility]    PAST MEDICAL/SURGICAL HISTORY:   Past Medical History:   Diagnosis Date    Arthritis     Elevated PSA     Essential hypertension, benign     Mixed hyperlipidemia     Prostatitis, unspecified     Wears glasses       Past Surgical History:   Procedure Laterality Date    HEMORRHOID SURGERY      KNEE ARTHROSCOPY W/ MENISCAL REPAIR Left     TONSILLECTOMY         FAMILY HISTORY:    Family History   Problem Relation Age of Onset    Cancer Brother      lunga cancer    Cancer Paternal Uncle      prostate cancer    Melanoma Neg Hx     Psoriasis Neg Hx     Lupus Neg Hx      "Eczema Neg Hx        SOCIAL HISTORY:    Social History     Social History    Marital status:      Spouse name: N/A    Number of children: N/A    Years of education: N/A     Occupational History    Not on file.     Social History Main Topics    Smoking status: Former Smoker     Packs/day: 1.00     Years: 40.00     Types: Cigarettes     Quit date: 7/18/2007    Smokeless tobacco: Never Used    Alcohol use 3.0 oz/week     6 Standard drinks or equivalent per week      Comment: OCCASSIONALLY    Drug use: No    Sexual activity: Not Currently     Other Topics Concern    Not on file     Social History Narrative    No narrative on file       PHYSICAL EXAM:  VITAL SIGNS:   Vitals:    03/26/18 0828   BP: (!) 143/88   BP Location: Right arm   Patient Position: Sitting   BP Method: Medium (Automatic)   Pulse: 74   Temp: 98.6 °F (37 °C)   TempSrc: Oral   Weight: 77.2 kg (170 lb 3.1 oz)   Height: 5' 3" (1.6 m)     GENERAL:  Patient appears well nourished, sitting on exam table, in no acute distress.  HEENT:  Atraumatic, normocephalic, PERRLA, EOMI, no conjunctival injection, sclerae are anicteric, normal external auditory canals,TMs clear b/l, gross hearing intact to whisper, MMM, no oropharygneal erythema or exudate.  NECK:  Supple, normal ROM, trachea is midline , no supraclavicular or cervical LAD or masses palpated.  Thyroid gland not palpable.  CARDIOVASCULAR:  RRR, normal S1 and S2, no m/r/g.  RESPIRATORY:  CTA b/l, no wheezes, rhonchi, rales.  No increased work of breathing, no  use of accessory muscles.  ABDOMEN:  Soft, nontender, nondistended, normoactive bowel sounds in all four quadrants, no rebound or guarding, no HSM or masses palpated.  Normal percussion.  EXTREMITIES:  2+ DP pulses b/l, no edema.  SKIN:  Warm, no lesions on exposed skin.  NEUROMUSCULAR:  Cranial nerves II-XII grossly intact.   No clubbing or cyanosis of digits/nails.  Steady gait.  PSYCH:  Patient is alert and oriented to person, " time, place. They are appropriately dressed and groomed. There is normal eye contact. Rate and tone of speech is normal. Normal insight, judgement. Normal thought content and process.         ASSESSMENT/PLAN: This is a 78 y.o. male who presents to clinic for evaluation of the following concerns    1. HTN: see below  2. Hyperlipidemia: CMP, lipid panel  3. Chronic knee pain: norco refilled. The risks/side effects of this medication were discussed with him. He will let us know in the future if he wants to meet with Dr. Rene to discuss nonsurgical options.    Patient readiness: acceptance and barriers:none    During the course of the visit the patient was educated and counseled about the following:     Hypertension:   Medication: no change.    Goals: Hypertension: Reduce Blood Pressure    Did patient meet goals/outcomes: Yes    The following self management tools provided: declined    Patient Instructions (the written plan) was given to the patient/family.     Time spent with patient: 30 minutes        Mindi Mar MD

## 2018-04-13 ENCOUNTER — PATIENT MESSAGE (OUTPATIENT)
Dept: FAMILY MEDICINE | Facility: CLINIC | Age: 78
End: 2018-04-13

## 2018-04-13 ENCOUNTER — PATIENT MESSAGE (OUTPATIENT)
Dept: UROLOGY | Facility: CLINIC | Age: 78
End: 2018-04-13

## 2018-04-13 DIAGNOSIS — E78.5 HYPERLIPIDEMIA, UNSPECIFIED HYPERLIPIDEMIA TYPE: ICD-10-CM

## 2018-04-13 DIAGNOSIS — I10 ESSENTIAL HYPERTENSION: ICD-10-CM

## 2018-04-13 RX ORDER — SIMVASTATIN 20 MG/1
20 TABLET, FILM COATED ORAL NIGHTLY
Qty: 90 TABLET | Refills: 3 | Status: SHIPPED | OUTPATIENT
Start: 2018-04-13 | End: 2019-04-23 | Stop reason: SDUPTHER

## 2018-04-13 RX ORDER — AMLODIPINE AND BENAZEPRIL HYDROCHLORIDE 5; 20 MG/1; MG/1
1 CAPSULE ORAL DAILY
Qty: 90 CAPSULE | Refills: 3 | Status: SHIPPED | OUTPATIENT
Start: 2018-04-13 | End: 2019-04-23 | Stop reason: SDUPTHER

## 2018-04-18 ENCOUNTER — DOCUMENTATION ONLY (OUTPATIENT)
Dept: FAMILY MEDICINE | Facility: CLINIC | Age: 78
End: 2018-04-18

## 2018-04-18 NOTE — PROGRESS NOTES
Pre-Visit Chart Review  For Appointment Scheduled on 04/20/18    There are no preventive care reminders to display for this patient.

## 2018-04-21 ENCOUNTER — DOCUMENTATION ONLY (OUTPATIENT)
Dept: FAMILY MEDICINE | Facility: CLINIC | Age: 78
End: 2018-04-21

## 2018-04-21 NOTE — PROGRESS NOTES
Pre-Visit Chart Review  For Appointment Scheduled on 04/24/2018    There are no preventive care reminders to display for this patient.

## 2018-04-24 ENCOUNTER — OFFICE VISIT (OUTPATIENT)
Dept: FAMILY MEDICINE | Facility: CLINIC | Age: 78
End: 2018-04-24
Payer: MEDICARE

## 2018-04-24 VITALS
RESPIRATION RATE: 18 BRPM | SYSTOLIC BLOOD PRESSURE: 142 MMHG | HEART RATE: 83 BPM | TEMPERATURE: 99 F | BODY MASS INDEX: 30.35 KG/M2 | WEIGHT: 171.31 LBS | HEIGHT: 63 IN | DIASTOLIC BLOOD PRESSURE: 82 MMHG

## 2018-04-24 DIAGNOSIS — I10 ESSENTIAL HYPERTENSION: ICD-10-CM

## 2018-04-24 DIAGNOSIS — H26.9 CATARACT, UNSPECIFIED CATARACT TYPE, UNSPECIFIED LATERALITY: Primary | ICD-10-CM

## 2018-04-24 DIAGNOSIS — Z01.818 PREOP EXAMINATION: ICD-10-CM

## 2018-04-24 PROCEDURE — 99999 PR PBB SHADOW E&M-EST. PATIENT-LVL IV: CPT | Mod: PBBFAC,,, | Performed by: PHYSICIAN ASSISTANT

## 2018-04-24 PROCEDURE — 3077F SYST BP >= 140 MM HG: CPT | Mod: CPTII,S$GLB,, | Performed by: PHYSICIAN ASSISTANT

## 2018-04-24 PROCEDURE — 3079F DIAST BP 80-89 MM HG: CPT | Mod: CPTII,S$GLB,, | Performed by: PHYSICIAN ASSISTANT

## 2018-04-24 PROCEDURE — 99213 OFFICE O/P EST LOW 20 MIN: CPT | Mod: S$GLB,,, | Performed by: PHYSICIAN ASSISTANT

## 2018-04-24 RX ORDER — NEOMYCIN SULFATE, POLYMYXIN B SULFATE, AND DEXAMETHASONE 3.5; 10000; 1 MG/G; [USP'U]/G; MG/G
OINTMENT OPHTHALMIC
COMMUNITY
Start: 2018-04-23 | End: 2018-06-08 | Stop reason: ALTCHOICE

## 2018-04-24 RX ORDER — PREDNISOLONE ACETATE 10 MG/ML
SUSPENSION/ DROPS OPHTHALMIC
COMMUNITY
Start: 2018-04-23 | End: 2018-06-08 | Stop reason: ALTCHOICE

## 2018-04-24 RX ORDER — MOXIFLOXACIN 5 MG/ML
SOLUTION/ DROPS OPHTHALMIC
COMMUNITY
Start: 2018-04-23 | End: 2018-06-08

## 2018-04-24 RX ORDER — CROMOLYN SODIUM 40 MG/ML
SOLUTION/ DROPS OPHTHALMIC
COMMUNITY
Start: 2018-04-21 | End: 2018-06-08 | Stop reason: ALTCHOICE

## 2018-04-24 NOTE — PROGRESS NOTES
Subjective:       Patient ID: Tha Guzman is a 78 y.o. male.    Chief Complaint: Pre-op Exam (cateract clearence for 5/8/18 with Dr. Sidhu)    Patient with controlled hypertension presents for cataract surgery clearance  He is feeling well and has no complaints.   He is off of aspirin and nsaids        Review of Systems   Constitutional: Negative for activity change, appetite change, fatigue and unexpected weight change.   Respiratory: Negative for cough, chest tightness and shortness of breath.    Cardiovascular: Negative for chest pain, palpitations and leg swelling.   Gastrointestinal: Negative for abdominal pain, anal bleeding, blood in stool, constipation, diarrhea and nausea.   Endocrine: Negative for polydipsia and polyuria.   Genitourinary: Negative for difficulty urinating, frequency, hematuria and urgency.   Musculoskeletal: Negative for arthralgias.   Skin: Negative for rash.   Neurological: Negative for dizziness and headaches.   Psychiatric/Behavioral: Negative for dysphoric mood. The patient is not nervous/anxious.        Objective:      Physical Exam   Constitutional: He appears well-developed and well-nourished. He is cooperative. No distress.   Eyes: Conjunctivae and EOM are normal. No scleral icterus.   Neck: Carotid bruit is not present.   Cardiovascular: Normal rate, regular rhythm and normal heart sounds.    Pulmonary/Chest: Effort normal and breath sounds normal.   Abdominal: Soft. Bowel sounds are normal. There is no tenderness.   Musculoskeletal:        Right lower leg: He exhibits no edema.        Left lower leg: He exhibits no edema.   Neurological: He is alert.   Vitals reviewed.      Assessment:       1. Cataract, unspecified cataract type, unspecified laterality    2. Preop examination    3. Essential hypertension        Plan:       Tha was seen today for pre-op exam.    Diagnoses and all orders for this visit:    Cataract, unspecified cataract type, unspecified  laterality    Preop examination    Essential hypertension    Low risk patient medically cleared for low risk procedure

## 2018-05-15 ENCOUNTER — PATIENT MESSAGE (OUTPATIENT)
Dept: FAMILY MEDICINE | Facility: CLINIC | Age: 78
End: 2018-05-15

## 2018-05-18 ENCOUNTER — PATIENT MESSAGE (OUTPATIENT)
Dept: FAMILY MEDICINE | Facility: CLINIC | Age: 78
End: 2018-05-18

## 2018-05-24 ENCOUNTER — OFFICE VISIT (OUTPATIENT)
Dept: FAMILY MEDICINE | Facility: CLINIC | Age: 78
End: 2018-05-24
Payer: MEDICARE

## 2018-05-24 VITALS
WEIGHT: 169 LBS | TEMPERATURE: 98 F | BODY MASS INDEX: 29.95 KG/M2 | HEIGHT: 63 IN | HEART RATE: 80 BPM | SYSTOLIC BLOOD PRESSURE: 122 MMHG | DIASTOLIC BLOOD PRESSURE: 80 MMHG

## 2018-05-24 DIAGNOSIS — I10 ESSENTIAL HYPERTENSION: ICD-10-CM

## 2018-05-24 DIAGNOSIS — H25.9 SENILE CATARACT OF LEFT EYE, UNSPECIFIED AGE-RELATED CATARACT TYPE: ICD-10-CM

## 2018-05-24 DIAGNOSIS — M17.0 PRIMARY OSTEOARTHRITIS OF BOTH KNEES: ICD-10-CM

## 2018-05-24 DIAGNOSIS — Z01.818 PRE-OP EXAM: Primary | ICD-10-CM

## 2018-05-24 DIAGNOSIS — N40.0 BENIGN PROSTATIC HYPERPLASIA, UNSPECIFIED WHETHER LOWER URINARY TRACT SYMPTOMS PRESENT: ICD-10-CM

## 2018-05-24 DIAGNOSIS — E78.5 HYPERLIPIDEMIA, UNSPECIFIED HYPERLIPIDEMIA TYPE: ICD-10-CM

## 2018-05-24 PROCEDURE — 99214 OFFICE O/P EST MOD 30 MIN: CPT | Mod: S$GLB,,, | Performed by: NURSE PRACTITIONER

## 2018-05-24 PROCEDURE — 3074F SYST BP LT 130 MM HG: CPT | Mod: CPTII,S$GLB,, | Performed by: NURSE PRACTITIONER

## 2018-05-24 PROCEDURE — 99999 PR PBB SHADOW E&M-EST. PATIENT-LVL V: CPT | Mod: PBBFAC,,, | Performed by: NURSE PRACTITIONER

## 2018-05-24 PROCEDURE — 3079F DIAST BP 80-89 MM HG: CPT | Mod: CPTII,S$GLB,, | Performed by: NURSE PRACTITIONER

## 2018-05-24 RX ORDER — NEPAFENAC 3 MG/ML
SUSPENSION/ DROPS OPHTHALMIC
Refills: 1 | COMMUNITY
Start: 2018-04-24 | End: 2018-10-04

## 2018-05-24 NOTE — PROGRESS NOTES
Subjective:       Patient ID: Tha Guzman is a 78 y.o. male.    Chief Complaint: Pre-op Exam    HPI   Chief Complaint  Chief Complaint   Patient presents with    Pre-op Exam       HPI  Tha Guzman is a 78 y.o. male with medical diagnoses as listed in the medical history and problem list that presents for pre-surgical clearance for left cataract surgery on 5/29/18 with Dr. Sidhu.  Patient had right cataract extraction with lens implant and good results without complications.  Patient has a history of hypertension and blood pressure is elevated 148/94 today. Patient also treated for hyperlipidemia and arthritis of the knees.  Patient is followed by Dr. Hawkins for BPH.  BMI today is 29.94 and patient has lost 2 pounds since last visit. Established patient with last clinic appointment 4/24/2018.        PAST MEDICAL HISTORY:  Past Medical History:   Diagnosis Date    Arthritis     Elevated PSA     Essential hypertension, benign     Mixed hyperlipidemia     Prostatitis, unspecified     Wears glasses        PAST SURGICAL HISTORY:  Past Surgical History:   Procedure Laterality Date    HEMORRHOID SURGERY      KNEE ARTHROSCOPY W/ MENISCAL REPAIR Left     TONSILLECTOMY         SOCIAL HISTORY:  Social History     Social History    Marital status:      Spouse name: N/A    Number of children: N/A    Years of education: N/A     Occupational History    Not on file.     Social History Main Topics    Smoking status: Former Smoker     Packs/day: 1.00     Years: 40.00     Types: Cigarettes     Quit date: 7/18/2007    Smokeless tobacco: Never Used    Alcohol use 3.0 oz/week     6 Standard drinks or equivalent per week      Comment: OCCASSIONALLY    Drug use: No    Sexual activity: Not Currently     Other Topics Concern    Not on file     Social History Narrative    No narrative on file       FAMILY HISTORY:  Family History   Problem Relation Age of Onset    Cancer Brother         lunga cancer     Cancer Paternal Uncle         prostate cancer    Melanoma Neg Hx     Psoriasis Neg Hx     Lupus Neg Hx     Eczema Neg Hx        ALLERGIES AND MEDICATIONS: updated and reviewed.  Review of patient's allergies indicates:   Allergen Reactions    Aminoglycosides Other (See Comments)     Lips swelled    Meperidine Itching    Penicillins Itching     Current Outpatient Prescriptions   Medication Sig Dispense Refill    amlodipine-benazepril 5-20 mg (LOTREL) 5-20 mg per capsule TAKE 1 CAPSULE BY MOUTH ONCE DAILY. 90 capsule 3    aspirin (ECOTRIN) 81 MG EC tablet Take 81 mg by mouth once daily.      azelastine (OPTIVAR) 0.05 % ophthalmic solution as needed.   3    clobetasol (TEMOVATE) 0.05 % external solution Thin film to AA scalp QHS PRN pruritus 50 mL 3    cromolyn (OPTICROM) 4 % ophthalmic solution       hydrocodone-acetaminophen 7.5-325mg (NORCO) 7.5-325 mg per tablet 1 tablet PO TID prn severe pain 90 tablet 0    ILEVRO 0.3 % DrpS INSTILL 2 DROPS IN RIGHT EYE DAILY  1    moxifloxacin (VIGAMOX) 0.5 % ophthalmic solution       neomycin-polymyxin-dexamethasone (DEXACINE) 3.5 mg/g-10,000 unit/g-0.1 % Oint       prednisoLONE acetate (PRED FORTE) 1 % DrpS       simvastatin (ZOCOR) 20 MG tablet TAKE 1 TABLET (20 MG TOTAL) BY MOUTH EVERY EVENING. 90 tablet 3    tamsulosin (FLOMAX) 0.4 mg Cp24 Take 1 capsule (0.4 mg total) by mouth once daily. 60 capsule 10    ciprofloxacin HCl (CIPRO) 500 MG tablet Take 1 tablet (500 mg total) by mouth 2 (two) times daily. 30 tablet 2     No current facility-administered medications for this visit.    I have reviewed the patient's medical history in detail and updated the computerized patient record.  Review of Systems   Constitutional: Negative for activity change, chills, fatigue, fever and unexpected weight change.   HENT: Negative for congestion, hearing loss, postnasal drip, rhinorrhea, sinus pain, sinus pressure and trouble swallowing.    Eyes: Negative for discharge  "and visual disturbance.   Respiratory: Negative for cough, chest tightness, shortness of breath and wheezing.    Cardiovascular: Negative for chest pain, palpitations and leg swelling.   Gastrointestinal: Negative for abdominal pain, blood in stool, constipation, diarrhea and vomiting.   Endocrine: Negative for polydipsia and polyuria.   Genitourinary: Negative for difficulty urinating, dysuria, hematuria and urgency.        Nocturia x 2    Musculoskeletal: Positive for arthralgias. Negative for joint swelling and neck pain.        Bilateral knee pain due to arthritis   Skin: Negative for rash and wound.   Neurological: Negative for dizziness, weakness, numbness and headaches.   Hematological: Does not bruise/bleed easily.   Psychiatric/Behavioral: Negative for confusion, dysphoric mood and sleep disturbance. The patient is not nervous/anxious.         Sleep disturbed by nocturia, no insomnia       Objective:       Vitals:    05/24/18 1556 05/24/18 1638   BP: (!) 148/94 122/80   BP Location: Right arm Right arm   Patient Position: Sitting Sitting   BP Method: Large (Automatic) Large (Manual)   Pulse: 80    Temp: 98.4 °F (36.9 °C)    TempSrc: Oral    Weight: 76.7 kg (169 lb)    Height: 5' 3" (1.6 m)      Physical Exam   Constitutional: He is oriented to person, place, and time. Vital signs are normal. He appears well-developed and well-nourished. No distress.   Blood pressure rechecked at end of visit 122/80   HENT:   Head: Normocephalic and atraumatic.   Right Ear: Tympanic membrane, external ear and ear canal normal.   Left Ear: Tympanic membrane, external ear and ear canal normal.   Nose: Nose normal. No mucosal edema.   Mouth/Throat: Oropharynx is clear and moist and mucous membranes are normal. No oropharyngeal exudate.   Eyes: Conjunctivae and lids are normal. Pupils are equal, round, and reactive to light. Right eye exhibits no discharge. Left eye exhibits no discharge. Right conjunctiva is not injected. Left " conjunctiva is not injected.   Neck: Normal range of motion. Neck supple. Normal carotid pulses present. Carotid bruit is not present. No thyromegaly present.   Cardiovascular: Normal rate, regular rhythm, S1 normal, S2 normal, normal heart sounds, intact distal pulses and normal pulses.  Exam reveals no gallop and no friction rub.    No murmur heard.  Pulses:       Carotid pulses are 2+ on the right side, and 2+ on the left side.       Radial pulses are 2+ on the right side, and 2+ on the left side.        Posterior tibial pulses are 2+ on the right side, and 2+ on the left side.   No edema   Pulmonary/Chest: Effort normal and breath sounds normal. No respiratory distress. He has no decreased breath sounds. He has no wheezes. He has no rhonchi. He has no rales.   Abdominal: Soft. Normal appearance and bowel sounds are normal. He exhibits no distension, no abdominal bruit, no pulsatile midline mass and no mass. There is no hepatosplenomegaly. There is no tenderness. No hernia.   Musculoskeletal: Normal range of motion. He exhibits no edema, tenderness or deformity.   Lymphadenopathy:        Head (right side): No submental, no submandibular, no tonsillar, no preauricular, no posterior auricular and no occipital adenopathy present.        Head (left side): No submental, no submandibular, no tonsillar, no preauricular, no posterior auricular and no occipital adenopathy present.     He has no cervical adenopathy.   Neurological: He is alert and oriented to person, place, and time. He has normal strength.   Skin: Skin is warm, dry and intact. Capillary refill takes less than 2 seconds. No rash noted. He is not diaphoretic. No pallor.   Psychiatric: He has a normal mood and affect. His speech is normal and behavior is normal. Judgment and thought content normal. His mood appears not anxious. Cognition and memory are normal. He does not exhibit a depressed mood.   Nursing note and vitals reviewed.      Assessment:       1.  Pre-op exam    2. Senile cataract of left eye, unspecified age-related cataract type    3. Essential hypertension    4. Hyperlipidemia, unspecified hyperlipidemia type    5. Benign prostatic hyperplasia, unspecified whether lower urinary tract symptoms present    6. Primary osteoarthritis of both knees    7. BMI 29.0-29.9,adult        Plan:   Tha was seen today for pre-op exam.    Diagnoses and all orders for this visit:    Pre-op exam   Patient is medically cleared for left cataract surgery with Dr. Sidhu on 5/29/18  Senile cataract of left eye, unspecified age-related cataract type   Scheduled for surgical extraction 5/29/18  Essential hypertension   Blood pressure controlled 122/80, continue current medications, no changes needed  Hyperlipidemia, unspecified hyperlipidemia type     Lab Results   Component Value Date    CHOL 142 03/26/2018    CHOL 145 09/25/2017    CHOL 161 03/22/2017     Lab Results   Component Value Date    HDL 45 03/26/2018    HDL 41 09/25/2017    HDL 49 03/22/2017     Lab Results   Component Value Date    LDLCALC 81.0 03/26/2018    LDLCALC 77.4 09/25/2017    LDLCALC 90.4 03/22/2017     Lab Results   Component Value Date    TRIG 80 03/26/2018    TRIG 133 09/25/2017    TRIG 108 03/22/2017     Lab Results   Component Value Date    CHOLHDL 31.7 03/26/2018    CHOLHDL 28.3 09/25/2017    CHOLHDL 30.4 03/22/2017      Continue zocor 20 mg daily as prescribed  Benign prostatic hyperplasia, unspecified whether lower urinary tract symptoms present   Patient denies difficulty urinating, nocturia x 2   Continue flomax as prescribed, follow up with Dr. Hawkins urology as directed  Primary osteoarthritis of both knees   To continue advil OTC as needed for pain   Dr. Mar prescribes hydrocodone/acetaminophen 7.5mg/325mg to be taken as needed which patient reports he uses sparingly   Patient considering treatment with PRP injections but may be cost prohibitive, not covered by insurance  BMI  29.0-29.9,adult   BMI 29.94 with recent 2 pound weight loss   Patient overweight but weight loss not recommended due to age.    Follow-up if symptoms worsen or fail to improve.     Patient readiness: acceptance and barriers:none    During the course of the visit the patient was educated and counseled about the following:     Hypertension:   Medication: no change.  Dietary sodium restriction.  Regular aerobic exercise.    Goals: Hypertension: Reduce Blood Pressure    Did patient meet goals/outcomes: Yes    The following self management tools provided: declined    Patient Instructions (the written plan) was given to the patient/family.     Time spent with patient: 45 minutes    Barriers to medications present (no )    Adverse reactions to current medications (no)    Over the counter medications reviewed (Yes)

## 2018-06-08 ENCOUNTER — OFFICE VISIT (OUTPATIENT)
Dept: UROLOGY | Facility: CLINIC | Age: 78
End: 2018-06-08
Payer: MEDICARE

## 2018-06-08 ENCOUNTER — APPOINTMENT (OUTPATIENT)
Dept: LAB | Facility: HOSPITAL | Age: 78
End: 2018-06-08
Attending: UROLOGY
Payer: MEDICARE

## 2018-06-08 VITALS
SYSTOLIC BLOOD PRESSURE: 150 MMHG | TEMPERATURE: 99 F | WEIGHT: 169.06 LBS | HEIGHT: 63 IN | DIASTOLIC BLOOD PRESSURE: 90 MMHG | HEART RATE: 98 BPM | RESPIRATION RATE: 18 BRPM | BODY MASS INDEX: 29.95 KG/M2

## 2018-06-08 DIAGNOSIS — N40.0 BENIGN PROSTATIC HYPERPLASIA, UNSPECIFIED WHETHER LOWER URINARY TRACT SYMPTOMS PRESENT: ICD-10-CM

## 2018-06-08 DIAGNOSIS — R31.29 MICROSCOPIC HEMATURIA: Primary | ICD-10-CM

## 2018-06-08 LAB
BILIRUB SERPL-MCNC: ABNORMAL MG/DL
BLOOD URINE, POC: ABNORMAL
COLOR, POC UA: YELLOW
GLUCOSE UR QL STRIP: ABNORMAL
KETONES UR QL STRIP: ABNORMAL
LEUKOCYTE ESTERASE URINE, POC: ABNORMAL
NITRITE, POC UA: ABNORMAL
PH, POC UA: 8
PROTEIN, POC: ABNORMAL
SPECIFIC GRAVITY, POC UA: 1.01
UROBILINOGEN, POC UA: ABNORMAL

## 2018-06-08 PROCEDURE — 3077F SYST BP >= 140 MM HG: CPT | Mod: CPTII,S$GLB,, | Performed by: UROLOGY

## 2018-06-08 PROCEDURE — 99999 PR PBB SHADOW E&M-EST. PATIENT-LVL III: CPT | Mod: PBBFAC,,, | Performed by: UROLOGY

## 2018-06-08 PROCEDURE — 87086 URINE CULTURE/COLONY COUNT: CPT

## 2018-06-08 PROCEDURE — 88112 CYTOPATH CELL ENHANCE TECH: CPT | Mod: 26,,, | Performed by: PATHOLOGY

## 2018-06-08 PROCEDURE — 81002 URINALYSIS NONAUTO W/O SCOPE: CPT | Mod: S$GLB,,, | Performed by: UROLOGY

## 2018-06-08 PROCEDURE — 88112 CYTOPATH CELL ENHANCE TECH: CPT | Performed by: PATHOLOGY

## 2018-06-08 PROCEDURE — 99214 OFFICE O/P EST MOD 30 MIN: CPT | Mod: 25,S$GLB,, | Performed by: UROLOGY

## 2018-06-08 PROCEDURE — 3080F DIAST BP >= 90 MM HG: CPT | Mod: CPTII,S$GLB,, | Performed by: UROLOGY

## 2018-06-08 RX ORDER — TAMSULOSIN HYDROCHLORIDE 0.4 MG/1
0.4 CAPSULE ORAL DAILY
Qty: 180 CAPSULE | Refills: 3 | Status: SHIPPED | OUTPATIENT
Start: 2018-06-08 | End: 2018-08-27 | Stop reason: SDUPTHER

## 2018-06-08 NOTE — PROGRESS NOTES
OFFICE NOTE    CHIEF COMPLAINT:  BPH with lower urinary tract symptoms and microscopic   hematuria,    HISTORY OF PRESENT ILLNESS:  This 78-year-old male returns for routine recheck.    He has a history of BPH with lower urinary tract symptoms for which he takes   Flomax 0.4 mg p.o. daily with which he is doing well and he denies any voiding   complaints on today's visit as the Flomax continues to be effective.  He also   has a history of microscopic hematuria for which he underwent a full workup in   April 2016.  The only finding was that of some BPH and also some bladder   calculi, but no evidence of any bladder lesions and no other significant   findings.  He does have history of prostatitis, but has had no recent episodes.    MEDICAL HISTORY UPDATE:  Reveals that he underwent bilateral cataract surgery   last month on the right eye on 05/09/2018 and on the left eye on 05/29/2018.    PHYSICAL EXAMINATION:  ABDOMEN:  Soft, benign, and nontender.  No masses.  No hernias.  No   organomegaly.  EXTERNAL GENITALIA:  Normal phallus with adequate meatus.  Testes descended and   feel normal.  No scrotal masses.  RECTAL:  Deferred at this time as his yearly examination was done at the last   visit and patient preferred not to have it done today.      His last PSA was 3.9 on 11/02/2017.    UA today, trace of blood, pH 5.0.    FINAL IMPRESSION:  Benign prostatic hypertrophy and microscopic hematuria.    RECOMMENDATIONS:  Urine for C and S and cytology.  Continue with the Flomax 0.4   mg p.o. daily and continue with his yearly PSA and prostate recheck.      MOSHE  dd: 06/08/2018 15:02:50 (CDT)  td: 06/09/2018 09:07:33 (MAILE)  Doc ID   #[id number]  Job ID #[id number]    CC:

## 2018-06-10 LAB — BACTERIA UR CULT: NO GROWTH

## 2018-06-13 ENCOUNTER — TELEPHONE (OUTPATIENT)
Dept: UROLOGY | Facility: CLINIC | Age: 78
End: 2018-06-13

## 2018-06-13 NOTE — TELEPHONE ENCOUNTER
----- Message from Peyton Hawkins MD sent at 6/13/2018  2:13 AM CDT -----  C&S and Cytology - neg    Rec: recheck 6 months

## 2018-06-14 ENCOUNTER — TELEPHONE (OUTPATIENT)
Dept: UROLOGY | Facility: CLINIC | Age: 78
End: 2018-06-14

## 2018-08-27 RX ORDER — TAMSULOSIN HYDROCHLORIDE 0.4 MG/1
CAPSULE ORAL
Qty: 90 CAPSULE | Refills: 1 | Status: SHIPPED | OUTPATIENT
Start: 2018-08-27 | End: 2019-05-02 | Stop reason: SDUPTHER

## 2018-10-02 ENCOUNTER — DOCUMENTATION ONLY (OUTPATIENT)
Dept: FAMILY MEDICINE | Facility: CLINIC | Age: 78
End: 2018-10-02

## 2018-10-02 NOTE — PROGRESS NOTES
Pre-Visit Chart Review  For Appointment Scheduled on 10/4/2018    Health Maintenance Due   Topic Date Due    Influenza Vaccine  08/01/2018

## 2018-10-04 ENCOUNTER — OFFICE VISIT (OUTPATIENT)
Dept: FAMILY MEDICINE | Facility: CLINIC | Age: 78
End: 2018-10-04
Payer: MEDICARE

## 2018-10-04 ENCOUNTER — LAB VISIT (OUTPATIENT)
Dept: LAB | Facility: HOSPITAL | Age: 78
End: 2018-10-04
Attending: FAMILY MEDICINE
Payer: MEDICARE

## 2018-10-04 VITALS
BODY MASS INDEX: 30.04 KG/M2 | SYSTOLIC BLOOD PRESSURE: 139 MMHG | HEART RATE: 88 BPM | HEIGHT: 63 IN | WEIGHT: 169.56 LBS | TEMPERATURE: 98 F | DIASTOLIC BLOOD PRESSURE: 87 MMHG

## 2018-10-04 DIAGNOSIS — M17.0 PRIMARY OSTEOARTHRITIS OF BOTH KNEES: ICD-10-CM

## 2018-10-04 DIAGNOSIS — E78.5 HYPERLIPIDEMIA, UNSPECIFIED HYPERLIPIDEMIA TYPE: ICD-10-CM

## 2018-10-04 DIAGNOSIS — I10 ESSENTIAL HYPERTENSION: Primary | ICD-10-CM

## 2018-10-04 LAB
ALBUMIN SERPL BCP-MCNC: 4.1 G/DL
ALP SERPL-CCNC: 51 U/L
ALT SERPL W/O P-5'-P-CCNC: 12 U/L
ANION GAP SERPL CALC-SCNC: 9 MMOL/L
AST SERPL-CCNC: 22 U/L
BASOPHILS # BLD AUTO: 0.04 K/UL
BASOPHILS NFR BLD: 0.7 %
BILIRUB SERPL-MCNC: 1 MG/DL
BUN SERPL-MCNC: 16 MG/DL
CALCIUM SERPL-MCNC: 9.4 MG/DL
CHLORIDE SERPL-SCNC: 107 MMOL/L
CHOLEST SERPL-MCNC: 150 MG/DL
CHOLEST/HDLC SERPL: 3.3 {RATIO}
CO2 SERPL-SCNC: 25 MMOL/L
CREAT SERPL-MCNC: 0.8 MG/DL
DIFFERENTIAL METHOD: ABNORMAL
EOSINOPHIL # BLD AUTO: 0.1 K/UL
EOSINOPHIL NFR BLD: 2.3 %
ERYTHROCYTE [DISTWIDTH] IN BLOOD BY AUTOMATED COUNT: 12.4 %
EST. GFR  (AFRICAN AMERICAN): >60 ML/MIN/1.73 M^2
EST. GFR  (NON AFRICAN AMERICAN): >60 ML/MIN/1.73 M^2
GLUCOSE SERPL-MCNC: 100 MG/DL
HCT VFR BLD AUTO: 43.9 %
HDLC SERPL-MCNC: 46 MG/DL
HDLC SERPL: 30.7 %
HGB BLD-MCNC: 14.8 G/DL
IMM GRANULOCYTES # BLD AUTO: 0.04 K/UL
IMM GRANULOCYTES NFR BLD AUTO: 0.7 %
LDLC SERPL CALC-MCNC: 80.6 MG/DL
LYMPHOCYTES # BLD AUTO: 1.4 K/UL
LYMPHOCYTES NFR BLD: 22.5 %
MCH RBC QN AUTO: 30.8 PG
MCHC RBC AUTO-ENTMCNC: 33.7 G/DL
MCV RBC AUTO: 92 FL
MONOCYTES # BLD AUTO: 0.6 K/UL
MONOCYTES NFR BLD: 9.7 %
NEUTROPHILS # BLD AUTO: 3.9 K/UL
NEUTROPHILS NFR BLD: 64.1 %
NONHDLC SERPL-MCNC: 104 MG/DL
NRBC BLD-RTO: 0 /100 WBC
PLATELET # BLD AUTO: 227 K/UL
PMV BLD AUTO: 9 FL
POTASSIUM SERPL-SCNC: 4.2 MMOL/L
PROT SERPL-MCNC: 7.4 G/DL
RBC # BLD AUTO: 4.8 M/UL
SODIUM SERPL-SCNC: 141 MMOL/L
TRIGL SERPL-MCNC: 117 MG/DL
TSH SERPL DL<=0.005 MIU/L-ACNC: 1.25 UIU/ML
WBC # BLD AUTO: 6.08 K/UL

## 2018-10-04 PROCEDURE — 3079F DIAST BP 80-89 MM HG: CPT | Mod: CPTII,,, | Performed by: FAMILY MEDICINE

## 2018-10-04 PROCEDURE — 80061 LIPID PANEL: CPT

## 2018-10-04 PROCEDURE — 1101F PT FALLS ASSESS-DOCD LE1/YR: CPT | Mod: CPTII,,, | Performed by: FAMILY MEDICINE

## 2018-10-04 PROCEDURE — 84443 ASSAY THYROID STIM HORMONE: CPT

## 2018-10-04 PROCEDURE — 3075F SYST BP GE 130 - 139MM HG: CPT | Mod: CPTII,,, | Performed by: FAMILY MEDICINE

## 2018-10-04 PROCEDURE — 80053 COMPREHEN METABOLIC PANEL: CPT

## 2018-10-04 PROCEDURE — 36415 COLL VENOUS BLD VENIPUNCTURE: CPT | Mod: PO

## 2018-10-04 PROCEDURE — 99999 PR PBB SHADOW E&M-EST. PATIENT-LVL III: CPT | Mod: PBBFAC,,, | Performed by: FAMILY MEDICINE

## 2018-10-04 PROCEDURE — 85025 COMPLETE CBC W/AUTO DIFF WBC: CPT

## 2018-10-04 PROCEDURE — 99213 OFFICE O/P EST LOW 20 MIN: CPT | Mod: PBBFAC,PO | Performed by: FAMILY MEDICINE

## 2018-10-04 PROCEDURE — 99214 OFFICE O/P EST MOD 30 MIN: CPT | Mod: S$PBB,,, | Performed by: FAMILY MEDICINE

## 2018-10-04 RX ORDER — HYDROCODONE BITARTRATE AND ACETAMINOPHEN 7.5; 325 MG/1; MG/1
TABLET ORAL
Qty: 90 TABLET | Refills: 0 | Status: SHIPPED | OUTPATIENT
Start: 2018-10-04 | End: 2019-04-23 | Stop reason: SDUPTHER

## 2018-10-04 RX ORDER — AZELASTINE HYDROCHLORIDE 0.5 MG/ML
SOLUTION/ DROPS OPHTHALMIC
Refills: 5 | COMMUNITY
Start: 2018-08-28 | End: 2021-09-16

## 2018-10-04 NOTE — PROGRESS NOTES
CHIEF COMPLAINT:  Follow up HTN, hyperlipidemia      HISTORY OF PRESENT ILLNESS:  Tha Guzman is a 78 y.o. male who presents to clinic for follow up    1. HTN: He is on lotrel.  He denies any cough, CP, SOB, edema    2. Hyperlipidemia:  He is on zocor and ASA. He denies any myalgia, dark colored urine.  He is due for lab work.     3. Chronic knee pain/arthritis:  He was followed by Dr. Ramirez and also his previous PCP.  He states that he takes norco 3-4 times a week for severe knee pain.  He has undergone corticosteroid and synvisc/euflexxa injections in the past with no improvement. He requests that the norco be written for 1 tablet PO TID prn as this is cheaper than writing it for 1 tablet 3-4 times a week prn. He is staying very active.         REVIEW OF SYSTEMS:  The patient denies any fever, chills, night sweats, headaches, vision changes, difficulty speaking or swallowing, decreased hearing, weight loss, weight gain, chest pain, palpitations, shortness of breath, cough, nausea, vomiting, abdominal pain, dysuria, diarrhea, constipation, hematuria, hematochezia, melena, changes in his hair, skin, nails, numbness or weakness in his extremities, erythema, swelling  over any of his joints, myalgia, swollen glands, easy bruising, fatigue, edema.      MEDICATIONS:   Reviewed and/or reconciled in EPIC    ALLERGIES:  Reviewed and/or reconciled in [facility]    PAST MEDICAL/SURGICAL HISTORY:   Past Medical History:   Diagnosis Date    Arthritis     Elevated PSA     Essential hypertension, benign     Mixed hyperlipidemia     Prostatitis, unspecified     Wears glasses       Past Surgical History:   Procedure Laterality Date    cataracts      COLONOSCOPY  w/poss hemorrhoid banding N/A 5/12/2015    Performed [facility]iel [facility] at [facility] ENDO    CYSTOSCOPY WITH RETROGRADE PYELOGRAM Bilateral 4/11/2016    Performed by Peyton Hawkins MD at [facility] OR    HEMORRHOID SURGERY      KNEE ARTHROSCOPY W/ MENISCAL REPAIR  "Left     TONSILLECTOMY         FAMILY HISTORY:    Family History   Problem Relation Age of Onset    Cancer Brother         lunga cancer    Cancer Paternal Uncle         prostate cancer    Melanoma Neg Hx     Psoriasis Neg Hx     Lupus Neg Hx     Eczema Neg Hx        SOCIAL HISTORY:    Social History     Socioeconomic History    Marital status:      Spouse name: Not on file    Number of children: Not on file    Years of education: Not on file    Highest education level: Not on file   Social Needs    Financial resource strain: Not on file    Food insecurity - worry: Not on file    Food insecurity - inability: Not on file    Transportation needs - medical: Not on file    Transportation needs - non-medical: Not on file   Occupational History    Not on file   Tobacco Use    Smoking status: Former Smoker     Packs/day: 1.00     Years: 40.00     Pack years: 40.00     Types: Cigarettes     Last attempt to quit: 2007     Years since quittin.2    Smokeless tobacco: Never Used   Substance and Sexual Activity    Alcohol use: Yes     Alcohol/week: 3.0 oz     Types: 6 Standard drinks or equivalent per week     Comment: OCCASSIONALLY    Drug use: No    Sexual activity: Not Currently   Other Topics Concern    Not on file   Social History Narrative    Not on file       PHYSICAL EXAM:  VITAL SIGNS:   Vitals:    10/04/18 0937   BP: 139/87   Pulse: 88   Temp: 98.2 °F (36.8 °C)   Weight: 76.9 kg (169 lb 8.5 oz)   Height: 5' 3" (1.6 m)     GENERAL:  Patient appears well nourished, sitting on exam table, in no acute distress.  HEENT:  Atraumatic, normocephalic, PERRLA, EOMI, no conjunctival injection, sclerae are anicteric, normal external auditory canals,TMs clear b/l, gross hearing intact to whisper, MMM, no oropharygneal erythema or exudate.  NECK:  Supple, normal ROM, trachea is midline , no supraclavicular or cervical LAD or masses palpated.  Thyroid gland not palpable.  CARDIOVASCULAR:  RRR, " normal S1 and S2, no m/r/g.  RESPIRATORY:  CTA b/l, no wheezes, rhonchi, rales.  No increased work of breathing, no  use of accessory muscles.  ABDOMEN:  Soft, nontender, nondistended, normoactive bowel sounds in all four quadrants, no rebound or guarding, no HSM or masses palpated.  Normal percussion.  EXTREMITIES:  2+ DP pulses b/l, no edema.  SKIN:  Warm, no lesions on exposed skin.  NEUROMUSCULAR:  Cranial nerves II-XII grossly intact.   No clubbing or cyanosis of digits/nails.  Steady gait.  PSYCH:  Patient is alert and oriented to person, time, place. They are appropriately dressed and groomed. There is normal eye contact. Rate and tone of speech is normal. Normal insight, judgement. Normal thought content and process.         ASSESSMENT/PLAN: This is a 78 y.o. male who presents to clinic for evaluation of the following concerns    1. HTN: see below  2. Hyperlipidemia: CMP, lipid panel  3. Chronic knee pain: norco refilled. The risks/side effects of this medication were discussed with him. He will let us know in the future if he wants to meet with Dr. Rene to discuss nonsurgical options.    Patient readiness: acceptance and barriers:none    During the course of the visit the patient was educated and counseled about the following:     Hypertension:   Medication: no change.    Goals: Hypertension: Reduce Blood Pressure    Did patient meet goals/outcomes: Yes    The following self management tools provided: declined    Patient Instructions (the written plan) was given to the patient/family.     Time spent with patient: 30 minutes        Mindi Mar MD

## 2018-10-22 ENCOUNTER — PATIENT MESSAGE (OUTPATIENT)
Dept: UROLOGY | Facility: CLINIC | Age: 78
End: 2018-10-22

## 2018-10-22 DIAGNOSIS — N40.0 BENIGN PROSTATIC HYPERPLASIA, UNSPECIFIED WHETHER LOWER URINARY TRACT SYMPTOMS PRESENT: Primary | ICD-10-CM

## 2018-12-03 ENCOUNTER — LAB VISIT (OUTPATIENT)
Dept: LAB | Facility: HOSPITAL | Age: 78
End: 2018-12-03
Attending: UROLOGY
Payer: MEDICARE

## 2018-12-03 DIAGNOSIS — N40.0 BENIGN PROSTATIC HYPERPLASIA, UNSPECIFIED WHETHER LOWER URINARY TRACT SYMPTOMS PRESENT: ICD-10-CM

## 2018-12-03 LAB — COMPLEXED PSA SERPL-MCNC: 3.7 NG/ML

## 2018-12-03 PROCEDURE — 36415 COLL VENOUS BLD VENIPUNCTURE: CPT | Mod: PO

## 2018-12-03 PROCEDURE — 84153 ASSAY OF PSA TOTAL: CPT

## 2018-12-11 ENCOUNTER — APPOINTMENT (OUTPATIENT)
Dept: LAB | Facility: HOSPITAL | Age: 78
End: 2018-12-11
Attending: UROLOGY
Payer: MEDICARE

## 2018-12-11 ENCOUNTER — OFFICE VISIT (OUTPATIENT)
Dept: UROLOGY | Facility: CLINIC | Age: 78
End: 2018-12-11
Payer: MEDICARE

## 2018-12-11 VITALS
DIASTOLIC BLOOD PRESSURE: 96 MMHG | HEART RATE: 90 BPM | RESPIRATION RATE: 18 BRPM | WEIGHT: 170.88 LBS | HEIGHT: 63 IN | BODY MASS INDEX: 30.28 KG/M2 | SYSTOLIC BLOOD PRESSURE: 139 MMHG | TEMPERATURE: 98 F

## 2018-12-11 DIAGNOSIS — R31.29 MICROHEMATURIA: Primary | ICD-10-CM

## 2018-12-11 DIAGNOSIS — N40.0 BENIGN PROSTATIC HYPERPLASIA, UNSPECIFIED WHETHER LOWER URINARY TRACT SYMPTOMS PRESENT: ICD-10-CM

## 2018-12-11 LAB
BILIRUB SERPL-MCNC: ABNORMAL MG/DL
BLOOD URINE, POC: ABNORMAL
COLOR, POC UA: YELLOW
GLUCOSE UR QL STRIP: ABNORMAL
KETONES UR QL STRIP: ABNORMAL
LEUKOCYTE ESTERASE URINE, POC: ABNORMAL
NITRITE, POC UA: ABNORMAL
PH, POC UA: 6.5
PROTEIN, POC: ABNORMAL
SPECIFIC GRAVITY, POC UA: 1.02
UROBILINOGEN, POC UA: ABNORMAL

## 2018-12-11 PROCEDURE — 88112 CYTOPATH CELL ENHANCE TECH: CPT | Mod: 26,,, | Performed by: PATHOLOGY

## 2018-12-11 PROCEDURE — 1101F PT FALLS ASSESS-DOCD LE1/YR: CPT | Mod: CPTII,S$GLB,, | Performed by: UROLOGY

## 2018-12-11 PROCEDURE — 3075F SYST BP GE 130 - 139MM HG: CPT | Mod: CPTII,S$GLB,, | Performed by: UROLOGY

## 2018-12-11 PROCEDURE — 99999 PR PBB SHADOW E&M-EST. PATIENT-LVL IV: CPT | Mod: PBBFAC,,, | Performed by: UROLOGY

## 2018-12-11 PROCEDURE — 87086 URINE CULTURE/COLONY COUNT: CPT

## 2018-12-11 PROCEDURE — 99214 OFFICE O/P EST MOD 30 MIN: CPT | Mod: 25,S$GLB,, | Performed by: UROLOGY

## 2018-12-11 PROCEDURE — 81002 URINALYSIS NONAUTO W/O SCOPE: CPT | Mod: S$GLB,,, | Performed by: UROLOGY

## 2018-12-11 PROCEDURE — 88112 CYTOPATH CELL ENHANCE TECH: CPT | Performed by: PATHOLOGY

## 2018-12-11 PROCEDURE — 3080F DIAST BP >= 90 MM HG: CPT | Mod: CPTII,S$GLB,, | Performed by: UROLOGY

## 2018-12-11 RX ORDER — CIPROFLOXACIN 500 MG/1
500 TABLET ORAL 2 TIMES DAILY
Qty: 20 TABLET | Refills: 3 | Status: SHIPPED | OUTPATIENT
Start: 2018-12-11 | End: 2019-10-25

## 2018-12-11 NOTE — PROGRESS NOTES
OFFICE NOTE    CHIEF COMPLAINT:  BPH, microscopic hematuria.    HISTORY OF PRESENT ILLNESS:  This 78-year-old male returns for routine recheck.    He has a history of BPH, lower urinary tract symptoms, which is being managed   with Flomax 0.4 mg p.o. daily, which continues to be effective and he is very   satisfied with the voiding status.  It does occasionally cause some degree of   nasal congestion, but is not a major bother.  He also has a history of   microscopic hematuria for which he underwent a full workup in April 2016.  The   only finding was that of some BPH and some bladder calculi, otherwise no   evidence of any bladder lesions and no other significant findings.  On today's   visit, the patient is doing well, denies any complaints and denies any gross   hematuria.    MEDICAL HISTORY UPDATE:  Reveals no change in general health since his last   visit.    PHYSICAL EXAMINATION:  ABDOMEN:  Soft, benign and nontender.  No masses.  No hernias or organomegaly.  EXTERNAL GENITALIA:  Normal phallus with adequate meatus.  Testes descended and   feel normal.  No scrotal masses.  RECTAL:  A 25 to 30 g smooth prostate.  No nodules.  Normal sphincter tone.    His most recent PSA was 3.7 on 12/03/2018 that compares to 3.9 on 11/02/2017.    UA did reveal trace of blood, few rbc's, and pH 6.5.    FINAL IMPRESSION:  BPH, history of microscopic hematuria.    RECOMMENDATIONS:  Continue on Flomax 0.4 mg p.o. daily and urine for C and S and   cytology.  We will contact the patient with the results.  The patient also did   request a prescription for some Cipro 500 mg p.o. b.i.d. which he likes to have at hand available for   him , for which he was also given a prescription for 10 days with refills.      MOSHE  dd: 12/11/2018 11:07:02 (CST)  td: 12/12/2018 01:57:17 (CST)  Doc ID   #[id number]  Job ID #[id number]    CC:

## 2018-12-13 LAB — BACTERIA UR CULT: NO GROWTH

## 2019-04-09 ENCOUNTER — PATIENT OUTREACH (OUTPATIENT)
Dept: ADMINISTRATIVE | Facility: HOSPITAL | Age: 79
End: 2019-04-09

## 2019-04-16 ENCOUNTER — PATIENT MESSAGE (OUTPATIENT)
Dept: UROLOGY | Facility: CLINIC | Age: 79
End: 2019-04-16

## 2019-04-23 ENCOUNTER — LAB VISIT (OUTPATIENT)
Dept: LAB | Facility: HOSPITAL | Age: 79
End: 2019-04-23
Attending: FAMILY MEDICINE
Payer: MEDICARE

## 2019-04-23 ENCOUNTER — OFFICE VISIT (OUTPATIENT)
Dept: FAMILY MEDICINE | Facility: CLINIC | Age: 79
End: 2019-04-23
Payer: MEDICARE

## 2019-04-23 VITALS
SYSTOLIC BLOOD PRESSURE: 132 MMHG | BODY MASS INDEX: 30.43 KG/M2 | HEIGHT: 63 IN | HEART RATE: 80 BPM | TEMPERATURE: 99 F | WEIGHT: 171.75 LBS | DIASTOLIC BLOOD PRESSURE: 82 MMHG

## 2019-04-23 DIAGNOSIS — M17.0 PRIMARY OSTEOARTHRITIS OF BOTH KNEES: ICD-10-CM

## 2019-04-23 DIAGNOSIS — E78.5 HYPERLIPIDEMIA, UNSPECIFIED HYPERLIPIDEMIA TYPE: ICD-10-CM

## 2019-04-23 DIAGNOSIS — I10 ESSENTIAL HYPERTENSION: Primary | ICD-10-CM

## 2019-04-23 DIAGNOSIS — I10 ESSENTIAL HYPERTENSION: ICD-10-CM

## 2019-04-23 LAB
ALBUMIN SERPL BCP-MCNC: 4.1 G/DL (ref 3.5–5.2)
ALP SERPL-CCNC: 49 U/L (ref 55–135)
ALT SERPL W/O P-5'-P-CCNC: 17 U/L (ref 10–44)
ANION GAP SERPL CALC-SCNC: 9 MMOL/L (ref 8–16)
AST SERPL-CCNC: 24 U/L (ref 10–40)
BILIRUB SERPL-MCNC: 1 MG/DL (ref 0.1–1)
BUN SERPL-MCNC: 13 MG/DL (ref 8–23)
CALCIUM SERPL-MCNC: 9.4 MG/DL (ref 8.7–10.5)
CHLORIDE SERPL-SCNC: 107 MMOL/L (ref 95–110)
CHOLEST SERPL-MCNC: 154 MG/DL (ref 120–199)
CHOLEST/HDLC SERPL: 2.9 {RATIO} (ref 2–5)
CO2 SERPL-SCNC: 25 MMOL/L (ref 23–29)
CREAT SERPL-MCNC: 0.8 MG/DL (ref 0.5–1.4)
EST. GFR  (AFRICAN AMERICAN): >60 ML/MIN/1.73 M^2
EST. GFR  (NON AFRICAN AMERICAN): >60 ML/MIN/1.73 M^2
GLUCOSE SERPL-MCNC: 89 MG/DL (ref 70–110)
HDLC SERPL-MCNC: 53 MG/DL (ref 40–75)
HDLC SERPL: 34.4 % (ref 20–50)
LDLC SERPL CALC-MCNC: 75.8 MG/DL (ref 63–159)
NONHDLC SERPL-MCNC: 101 MG/DL
POTASSIUM SERPL-SCNC: 4.1 MMOL/L (ref 3.5–5.1)
PROT SERPL-MCNC: 7.4 G/DL (ref 6–8.4)
SODIUM SERPL-SCNC: 141 MMOL/L (ref 136–145)
TRIGL SERPL-MCNC: 126 MG/DL (ref 30–150)

## 2019-04-23 PROCEDURE — 3075F SYST BP GE 130 - 139MM HG: CPT | Mod: CPTII,S$GLB,, | Performed by: FAMILY MEDICINE

## 2019-04-23 PROCEDURE — 1101F PR PT FALLS ASSESS DOC 0-1 FALLS W/OUT INJ PAST YR: ICD-10-PCS | Mod: CPTII,S$GLB,, | Performed by: FAMILY MEDICINE

## 2019-04-23 PROCEDURE — 3079F PR MOST RECENT DIASTOLIC BLOOD PRESSURE 80-89 MM HG: ICD-10-PCS | Mod: CPTII,S$GLB,, | Performed by: FAMILY MEDICINE

## 2019-04-23 PROCEDURE — 36415 COLL VENOUS BLD VENIPUNCTURE: CPT | Mod: PO

## 2019-04-23 PROCEDURE — 3075F PR MOST RECENT SYSTOLIC BLOOD PRESS GE 130-139MM HG: ICD-10-PCS | Mod: CPTII,S$GLB,, | Performed by: FAMILY MEDICINE

## 2019-04-23 PROCEDURE — 3079F DIAST BP 80-89 MM HG: CPT | Mod: CPTII,S$GLB,, | Performed by: FAMILY MEDICINE

## 2019-04-23 PROCEDURE — 1101F PT FALLS ASSESS-DOCD LE1/YR: CPT | Mod: CPTII,S$GLB,, | Performed by: FAMILY MEDICINE

## 2019-04-23 PROCEDURE — 99999 PR PBB SHADOW E&M-EST. PATIENT-LVL III: CPT | Mod: PBBFAC,,, | Performed by: FAMILY MEDICINE

## 2019-04-23 PROCEDURE — 99214 OFFICE O/P EST MOD 30 MIN: CPT | Mod: S$GLB,,, | Performed by: FAMILY MEDICINE

## 2019-04-23 PROCEDURE — 80061 LIPID PANEL: CPT

## 2019-04-23 PROCEDURE — 80053 COMPREHEN METABOLIC PANEL: CPT

## 2019-04-23 PROCEDURE — 99999 PR PBB SHADOW E&M-EST. PATIENT-LVL III: ICD-10-PCS | Mod: PBBFAC,,, | Performed by: FAMILY MEDICINE

## 2019-04-23 PROCEDURE — 99214 PR OFFICE/OUTPT VISIT, EST, LEVL IV, 30-39 MIN: ICD-10-PCS | Mod: S$GLB,,, | Performed by: FAMILY MEDICINE

## 2019-04-23 RX ORDER — HYDROCODONE BITARTRATE AND ACETAMINOPHEN 7.5; 325 MG/1; MG/1
TABLET ORAL
Qty: 90 TABLET | Refills: 0 | Status: SHIPPED | OUTPATIENT
Start: 2019-04-23 | End: 2019-10-25 | Stop reason: SDUPTHER

## 2019-04-23 RX ORDER — SIMVASTATIN 20 MG/1
20 TABLET, FILM COATED ORAL NIGHTLY
Qty: 90 TABLET | Refills: 3 | Status: SHIPPED | OUTPATIENT
Start: 2019-04-23 | End: 2020-05-05 | Stop reason: SDUPTHER

## 2019-04-23 RX ORDER — AMLODIPINE AND BENAZEPRIL HYDROCHLORIDE 5; 20 MG/1; MG/1
1 CAPSULE ORAL DAILY
Qty: 90 CAPSULE | Refills: 3 | Status: SHIPPED | OUTPATIENT
Start: 2019-04-23 | End: 2020-07-22 | Stop reason: SDUPTHER

## 2019-04-23 NOTE — PROGRESS NOTES
CHIEF COMPLAINT:  Follow up HTN, hyperlipidemia      HISTORY OF PRESENT ILLNESS:  Tha Guzman is a 79 y.o. male who presents to clinic for follow up on hypertension and hyperlipidemia    1. HTN: He is on lotrel.  He denies any cough, CP, SOB, edema    2. Hyperlipidemia:  He is on zocor and ASA. He denies any myalgia, dark colored urine.  He is due for lab work.     3. Chronic knee pain/arthritis:  He was followed by Dr. Ramirez and also his previous PCP.  He states that he takes norco 3-4 times a week for severe knee pain.  He has undergone corticosteroid and synvisc/euflexxa injections in the past with no improvement. He requests that the norco be written for 1 tablet PO TID prn as this is cheaper than writing it for 1 tablet 3-4 times a week prn. He is staying very active.         REVIEW OF SYSTEMS:  The patient denies any fever, chills, night sweats, headaches, vision changes, difficulty speaking or swallowing, decreased hearing, weight loss, weight gain, chest pain, palpitations, shortness of breath, cough, nausea, vomiting, abdominal pain, dysuria, diarrhea, constipation, hematuria, hematochezia, melena, changes in his hair, skin, nails, numbness or weakness in his extremities, erythema, swelling  over any of his joints, myalgia, swollen glands, easy bruising, fatigue, edema.      MEDICATIONS:   Reviewed and/or reconciled in EPIC    ALLERGIES:  Reviewed and/or reconciled in [facility]    PAST MEDICAL/SURGICAL HISTORY:   Past Medical History:   Diagnosis Date    Arthritis     Elevated PSA     Essential hypertension, benign     Mixed hyperlipidemia     Prostatitis, unspecified     Wears glasses       Past Surgical History:   Procedure Laterality Date    cataracts      COLONOSCOPY  w/poss hemorrhoid banding N/A 5/12/2015    Performed by [facility]iel [facility]ria [facility] at [facility]O    CYSTOSCOPY WITH RETROGRADE PYELOGRAM Bilateral 4/11/2016    Performed by [facility]eyton Hawkins [facility] at [facility] OR    HEMORRHOID SURGERY       "KNEE ARTHROSCOPY W/ MENISCAL REPAIR Left     TONSILLECTOMY         FAMILY HISTORY:    Family History   Problem Relation Age of Onset    Cancer Brother         lunga cancer    Cancer Paternal Uncle         prostate cancer    Melanoma Neg Hx     Psoriasis Neg Hx     Lupus Neg Hx     Eczema Neg Hx        SOCIAL HISTORY:    Social History     Socioeconomic History    Marital status:      Spouse name: Not on file    Number of children: Not on file    Years of education: Not on file    Highest education level: Not on file   Occupational History    Not on file   Social Needs    Financial resource strain: Not on file    Food insecurity:     Worry: Not on file     Inability: Not on file    Transportation needs:     Medical: Not on file     Non-medical: Not on file   Tobacco Use    Smoking status: Former Smoker     Packs/day: 1.00     Years: 40.00     Pack years: 40.00     Types: Cigarettes     Last attempt to quit: 2007     Years since quittin.7    Smokeless tobacco: Never Used   Substance and Sexual Activity    Alcohol use: Yes     Alcohol/week: 3.0 oz     Types: 6 Standard drinks or equivalent per week     Comment: OCCASSIONALLY    Drug use: No    Sexual activity: Not Currently   Lifestyle    Physical activity:     Days per week: Not on file     Minutes per session: Not on file    Stress: Not on file   Relationships    Social connections:     Talks on phone: Not on file     Gets together: Not on file     Attends Synagogue service: Not on file     Active member of club or organization: Not on file     Attends meetings of clubs or organizations: Not on file     Relationship status: Not on file   Other Topics Concern    Not on file   Social History Narrative    Not on file       PHYSICAL EXAM:  VITAL SIGNS:   Vitals:    19 0806   BP: (!) 143/94   Pulse: 80   Temp: 99 °F (37.2 °C)   Weight: 77.9 kg (171 lb 11.8 oz)   Height: 5' 3" (1.6 m)     GENERAL:  Patient appears well " nourished, sitting on exam table, in no acute distress.  HEENT:  Atraumatic, normocephalic, PERRLA, EOMI, no conjunctival injection, sclerae are anicteric, normal external auditory canals,TMs clear b/l, gross hearing intact to whisper, MMM, no oropharygneal erythema or exudate.  NECK:  Supple, normal ROM, trachea is midline , no supraclavicular or cervical LAD or masses palpated.  Thyroid gland not palpable.  CARDIOVASCULAR:  RRR, normal S1 and S2, no m/r/g.  RESPIRATORY:  CTA b/l, no wheezes, rhonchi, rales.  No increased work of breathing, no  use of accessory muscles.  ABDOMEN:  Soft, nontender, nondistended, normoactive bowel sounds in all four quadrants, no rebound or guarding, no HSM or masses palpated.  Normal percussion.  EXTREMITIES:  2+ DP pulses b/l, no edema.  SKIN:  Warm, no lesions on exposed skin.  NEUROMUSCULAR:  Cranial nerves II-XII grossly intact.   No clubbing or cyanosis of digits/nails.  Steady gait.  PSYCH:  Patient is alert and oriented to person, time, place. They are appropriately dressed and groomed. There is normal eye contact. Rate and tone of speech is normal. Normal insight, judgement. Normal thought content and process.         ASSESSMENT/PLAN: This is a 79 y.o. male who presents to clinic for evaluation of the following concerns    1. HTN: see below  2. Hyperlipidemia: CMP, lipid panel  3. Chronic knee pain: norco refilled. The risks/side effects of this medication were discussed with him. He will let us know in the future if he wants to meet with Dr. Rene to discuss nonsurgical options for treatment of knee pain.    Patient readiness: acceptance and barriers:none    During the course of the visit the patient was educated and counseled about the following:     Hypertension:   Medication: no change.    Goals: Hypertension: Reduce Blood Pressure    Did patient meet goals/outcomes: Yes    The following self management tools provided: declined    Patient Instructions (the written  plan) was given to the patient/family.     Time spent with patient: 30 minutes        Mindi Mar MD

## 2019-05-02 ENCOUNTER — PATIENT MESSAGE (OUTPATIENT)
Dept: UROLOGY | Facility: CLINIC | Age: 79
End: 2019-05-02

## 2019-05-02 RX ORDER — TAMSULOSIN HYDROCHLORIDE 0.4 MG/1
1 CAPSULE ORAL 2 TIMES DAILY
Qty: 180 CAPSULE | Refills: 3 | Status: SHIPPED | OUTPATIENT
Start: 2019-05-02 | End: 2020-01-14

## 2019-05-05 RX ORDER — TAMSULOSIN HYDROCHLORIDE 0.4 MG/1
0.4 CAPSULE ORAL DAILY
Qty: 90 CAPSULE | Refills: 1 | Status: SHIPPED | OUTPATIENT
Start: 2019-05-05 | End: 2019-05-28 | Stop reason: SDUPTHER

## 2019-05-28 ENCOUNTER — OFFICE VISIT (OUTPATIENT)
Dept: UROLOGY | Facility: CLINIC | Age: 79
End: 2019-05-28
Payer: MEDICARE

## 2019-05-28 VITALS
SYSTOLIC BLOOD PRESSURE: 128 MMHG | DIASTOLIC BLOOD PRESSURE: 83 MMHG | HEIGHT: 63 IN | WEIGHT: 162.25 LBS | BODY MASS INDEX: 28.75 KG/M2 | RESPIRATION RATE: 18 BRPM | HEART RATE: 81 BPM | TEMPERATURE: 98 F

## 2019-05-28 DIAGNOSIS — N40.0 BENIGN PROSTATIC HYPERPLASIA WITHOUT LOWER URINARY TRACT SYMPTOMS: Primary | ICD-10-CM

## 2019-05-28 DIAGNOSIS — R31.29 MICROSCOPIC HEMATURIA: ICD-10-CM

## 2019-05-28 LAB
BILIRUB SERPL-MCNC: ABNORMAL MG/DL
BLOOD URINE, POC: ABNORMAL
COLOR, POC UA: YELLOW
GLUCOSE UR QL STRIP: ABNORMAL
KETONES UR QL STRIP: ABNORMAL
LEUKOCYTE ESTERASE URINE, POC: ABNORMAL
NITRITE, POC UA: ABNORMAL
PH, POC UA: 5.5
PROTEIN, POC: 30
SPECIFIC GRAVITY, POC UA: 1
UROBILINOGEN, POC UA: ABNORMAL

## 2019-05-28 PROCEDURE — 81000 CHG URINALYSIS, NONAUTO, W/SCOPE: ICD-10-PCS | Mod: S$GLB,,, | Performed by: UROLOGY

## 2019-05-28 PROCEDURE — 99999 PR PBB SHADOW E&M-EST. PATIENT-LVL III: ICD-10-PCS | Mod: PBBFAC,,, | Performed by: UROLOGY

## 2019-05-28 PROCEDURE — 99214 OFFICE O/P EST MOD 30 MIN: CPT | Mod: 25,S$GLB,, | Performed by: UROLOGY

## 2019-05-28 PROCEDURE — 81000 URINALYSIS NONAUTO W/SCOPE: CPT | Mod: S$GLB,,, | Performed by: UROLOGY

## 2019-05-28 PROCEDURE — 99999 PR PBB SHADOW E&M-EST. PATIENT-LVL III: CPT | Mod: PBBFAC,,, | Performed by: UROLOGY

## 2019-05-28 PROCEDURE — 99214 PR OFFICE/OUTPT VISIT, EST, LEVL IV, 30-39 MIN: ICD-10-PCS | Mod: 25,S$GLB,, | Performed by: UROLOGY

## 2019-05-28 RX ORDER — CIPROFLOXACIN 500 MG/1
500 TABLET ORAL 2 TIMES DAILY
Qty: 20 TABLET | Refills: 0 | Status: SHIPPED | OUTPATIENT
Start: 2019-05-28 | End: 2019-10-25

## 2019-05-29 NOTE — PROGRESS NOTES
CHIEF COMPLAINT:  BPH, history of microscopic hematuria.    HISTORY OF PRESENT ILLNESS:  This 79-year-old male returns for routine recheck.    He has a history of BPH, which he manages with Flomax 0.4 mg p.o. b.i.d. that   he takes right now, and he states he is doing well and denies any urinary   complaints.  He also has a history of microscopic hematuria for which he   underwent a full workup in April 2016 and the only finding was that of a bladder   calculus, but he has had no further problems since then.    No other change in general health since his last visit of 12/11/2018.    PHYSICAL EXAMINATION:  ABDOMEN:  Soft, benign, and nontender.  No mass.  No hernias or organomegaly.  EXTERNAL GENITAL AND RECTAL:  Done at the last visit on 12/11/2018 was   essentially unremarkable.    His last PSA was 3.7 on 12/03/2018.    UA was negative with pH 5.5 and more specifically, no evidence of any hematuria.    FINAL IMPRESSION:  BPH, history of microscopic hematuria.    RECOMMENDATIONS:  Continue with Flomax 0.4 mg p.o. b.i.d.  Recheck in six   months.  The patient did also request a prescription for some Cipro 500 mg p.o.   b.i.d.  He likes to have them at all times available for him and he was given   the prescription.      MOSHE  dd: 05/28/2019 17:38:28 (CDT)  td: 05/29/2019 03:42:06 (CDT)  Doc ID   #[id number]  Job ID #[id number]    CC:

## 2019-07-25 ENCOUNTER — TELEPHONE (OUTPATIENT)
Dept: GASTROENTEROLOGY | Facility: CLINIC | Age: 79
End: 2019-07-25

## 2019-07-25 NOTE — TELEPHONE ENCOUNTER
----- Message from Pedro Arevalo sent at 7/25/2019  1:16 PM CDT -----  Type:  Patient Returning Call    Who Called:  Patient  Who Left Message for Patient:  Nadiya  Does the patient know what this is regarding?:  Appointment  Best Call Back Number:  [phone]  Additional Information:

## 2019-07-25 NOTE — TELEPHONE ENCOUNTER
Returned call instructed patient karissa Hearn in office and this office did not call, patient to keep appointment as scheduled with Dr. Sr 7/30 at 2pm

## 2019-07-30 ENCOUNTER — OFFICE VISIT (OUTPATIENT)
Dept: GASTROENTEROLOGY | Facility: CLINIC | Age: 79
End: 2019-07-30
Payer: MEDICARE

## 2019-07-30 VITALS
BODY MASS INDEX: 29.69 KG/M2 | HEIGHT: 63 IN | DIASTOLIC BLOOD PRESSURE: 96 MMHG | WEIGHT: 167.56 LBS | HEART RATE: 75 BPM | SYSTOLIC BLOOD PRESSURE: 158 MMHG

## 2019-07-30 DIAGNOSIS — K62.5 RECTAL BLEEDING: Primary | ICD-10-CM

## 2019-07-30 DIAGNOSIS — K59.00 CONSTIPATION, UNSPECIFIED CONSTIPATION TYPE: ICD-10-CM

## 2019-07-30 DIAGNOSIS — Z86.010 HISTORY OF COLON POLYPS: ICD-10-CM

## 2019-07-30 PROCEDURE — 99204 PR OFFICE/OUTPT VISIT, NEW, LEVL IV, 45-59 MIN: ICD-10-PCS | Mod: S$GLB,,, | Performed by: INTERNAL MEDICINE

## 2019-07-30 PROCEDURE — 99999 PR PBB SHADOW E&M-EST. PATIENT-LVL III: ICD-10-PCS | Mod: PBBFAC,,, | Performed by: INTERNAL MEDICINE

## 2019-07-30 PROCEDURE — 3077F PR MOST RECENT SYSTOLIC BLOOD PRESSURE >= 140 MM HG: ICD-10-PCS | Mod: CPTII,S$GLB,, | Performed by: INTERNAL MEDICINE

## 2019-07-30 PROCEDURE — 3080F PR MOST RECENT DIASTOLIC BLOOD PRESSURE >= 90 MM HG: ICD-10-PCS | Mod: CPTII,S$GLB,, | Performed by: INTERNAL MEDICINE

## 2019-07-30 PROCEDURE — 1101F PR PT FALLS ASSESS DOC 0-1 FALLS W/OUT INJ PAST YR: ICD-10-PCS | Mod: CPTII,S$GLB,, | Performed by: INTERNAL MEDICINE

## 2019-07-30 PROCEDURE — 1101F PT FALLS ASSESS-DOCD LE1/YR: CPT | Mod: CPTII,S$GLB,, | Performed by: INTERNAL MEDICINE

## 2019-07-30 PROCEDURE — 3080F DIAST BP >= 90 MM HG: CPT | Mod: CPTII,S$GLB,, | Performed by: INTERNAL MEDICINE

## 2019-07-30 PROCEDURE — 99204 OFFICE O/P NEW MOD 45 MIN: CPT | Mod: S$GLB,,, | Performed by: INTERNAL MEDICINE

## 2019-07-30 PROCEDURE — 99999 PR PBB SHADOW E&M-EST. PATIENT-LVL III: CPT | Mod: PBBFAC,,, | Performed by: INTERNAL MEDICINE

## 2019-07-30 PROCEDURE — 3077F SYST BP >= 140 MM HG: CPT | Mod: CPTII,S$GLB,, | Performed by: INTERNAL MEDICINE

## 2019-07-30 NOTE — H&P (VIEW-ONLY)
"Subjective:       Patient ID: Tha Guzman is a 79 y.o. male.    This patient is new to me.  Referring provider:  Dr. Mar for rectal bleeding.      Chief Complaint: Rectal Bleeding    Patient seen for rectal bleeding, onset about 2 weeks ago, copious amount, red in color, with associated signs/symptoms including lower abdominal cramping and constipation which is since improved, and alleviating/exacerbating factors including none.  He states that he stopped taking his metamucil and began to have constipation with straining and lower abdominal discomfort.  He subsequently had rectal bleeding as above.  These symptoms persisted for a couple of weeks but began to resolve with conservative therapy and resumption of fiber.  He is feeling better.  He had colonoscopy with hemorrhoids and adenoma noted with Dr. Echavarria (records independently reviewed) in 2015.  No other acute complaints.      Review of Systems   Constitutional: Negative for chills, fatigue and fever.   HENT: Negative for trouble swallowing.    Respiratory: Negative for cough, shortness of breath and wheezing.    Cardiovascular: Negative for chest pain and palpitations.   Gastrointestinal: Positive for abdominal pain, blood in stool and constipation. Negative for diarrhea, nausea and vomiting.   Musculoskeletal: Negative for arthralgias and myalgias.   Skin: Negative for color change and rash.   Neurological: Negative for dizziness, weakness and numbness.   Psychiatric/Behavioral: Negative for confusion. The patient is not nervous/anxious.    All other systems reviewed and are negative.      Objective:       Vitals:    07/30/19 1405   BP: (!) 158/96   Pulse: 75   Weight: 76 kg (167 lb 8.8 oz)   Height: 5' 3" (1.6 m)       Physical Exam   Constitutional: He is oriented to person, place, and time. He appears well-developed and well-nourished.   HENT:   Head: Normocephalic and atraumatic.   Mouth/Throat: Oropharynx is clear and moist.   Eyes: Pupils are " equal, round, and reactive to light. Conjunctivae are normal. No scleral icterus.   Neck: Normal range of motion. Neck supple. No thyromegaly present.   Cardiovascular: Normal rate and regular rhythm.   No murmur heard.  Pulmonary/Chest: Effort normal and breath sounds normal. He has no wheezes.   Abdominal: Soft. Bowel sounds are normal. He exhibits no distension. There is no tenderness.   Musculoskeletal: He exhibits no edema.   Lymphadenopathy:     He has no cervical adenopathy.   Neurological: He is alert and oriented to person, place, and time.   Skin: Skin is warm and dry. No rash noted. No erythema.   Psychiatric: He has a normal mood and affect. His behavior is normal.   Vitals reviewed.        Lab Results   Component Value Date    WBC 6.08 10/04/2018    HGB 14.8 10/04/2018    HCT 43.9 10/04/2018    MCV 92 10/04/2018     10/04/2018       CMP  Sodium   Date Value Ref Range Status   04/23/2019 141 136 - 145 mmol/L Final     Potassium   Date Value Ref Range Status   04/23/2019 4.1 3.5 - 5.1 mmol/L Final     Chloride   Date Value Ref Range Status   04/23/2019 107 95 - 110 mmol/L Final     CO2   Date Value Ref Range Status   04/23/2019 25 23 - 29 mmol/L Final     Glucose   Date Value Ref Range Status   04/23/2019 89 70 - 110 mg/dL Final     BUN, Bld   Date Value Ref Range Status   04/23/2019 13 8 - 23 mg/dL Final     Creatinine   Date Value Ref Range Status   04/23/2019 0.8 0.5 - 1.4 mg/dL Final     Calcium   Date Value Ref Range Status   04/23/2019 9.4 8.7 - 10.5 mg/dL Final     Total Protein   Date Value Ref Range Status   04/23/2019 7.4 6.0 - 8.4 g/dL Final     Albumin   Date Value Ref Range Status   04/23/2019 4.1 3.5 - 5.2 g/dL Final     Total Bilirubin   Date Value Ref Range Status   04/23/2019 1.0 0.1 - 1.0 mg/dL Final     Comment:     For infants and newborns, interpretation of results should be based  on gestational age, weight and in agreement with clinical  observations.  Premature Infant  recommended reference ranges:  Up to 24 hours.............<8.0 mg/dL  Up to 48 hours............<12.0 mg/dL  3-5 days..................<15.0 mg/dL  6-29 days.................<15.0 mg/dL       Alkaline Phosphatase   Date Value Ref Range Status   04/23/2019 49 (L) 55 - 135 U/L Final     AST   Date Value Ref Range Status   04/23/2019 24 10 - 40 U/L Final     ALT   Date Value Ref Range Status   04/23/2019 17 10 - 44 U/L Final     Anion Gap   Date Value Ref Range Status   04/23/2019 9 8 - 16 mmol/L Final     eGFR if    Date Value Ref Range Status   04/23/2019 >60.0 >60 mL/min/1.73 m^2 Final     eGFR if non    Date Value Ref Range Status   04/23/2019 >60.0 >60 mL/min/1.73 m^2 Final     Comment:     Calculation used to obtain the estimated glomerular filtration  rate (eGFR) is the CKD-EPI equation.          CXR was independently visualized and reviewed by me and showed no acute process.      Old records from Dr. Echavarria reviewed and are as summarized above in the HPI.    Assessment:       1. Rectal bleeding    2. Constipation, unspecified constipation type    3. History of colon polyps        Plan:       1.  Recommend daily exercise, adequate water intake, and high fiber diet.  Recommend daily miralax (17g PO once or twice daily) with intermittently dosed dulcolax (every 2-3 days)  to facilitate bowel movements.  If no relief with this, consider adding emollient laxative (castor oil or mineral oil) +/- enema.  2.  Schedule colonoscopy  3.  Further recommendations to follow after above.  4.  Communication will be sent to the referring provider regarding my assessment and plan on this patient via EPIC.

## 2019-08-14 ENCOUNTER — ANESTHESIA (OUTPATIENT)
Dept: ENDOSCOPY | Facility: HOSPITAL | Age: 79
End: 2019-08-14
Payer: MEDICARE

## 2019-08-14 ENCOUNTER — ANESTHESIA EVENT (OUTPATIENT)
Dept: ENDOSCOPY | Facility: HOSPITAL | Age: 79
End: 2019-08-14
Payer: MEDICARE

## 2019-08-14 ENCOUNTER — HOSPITAL ENCOUNTER (OUTPATIENT)
Facility: HOSPITAL | Age: 79
Discharge: HOME | End: 2019-08-14
Attending: INTERNAL MEDICINE | Admitting: INTERNAL MEDICINE
Payer: MEDICARE

## 2019-08-14 DIAGNOSIS — K64.8 INTERNAL HEMORRHOIDS: ICD-10-CM

## 2019-08-14 DIAGNOSIS — K57.30 DIVERTICULOSIS OF LARGE INTESTINE WITHOUT HEMORRHAGE: ICD-10-CM

## 2019-08-14 DIAGNOSIS — K62.5 RECTAL BLEEDING: ICD-10-CM

## 2019-08-14 DIAGNOSIS — K63.5 POLYP OF COLON, UNSPECIFIED PART OF COLON, UNSPECIFIED TYPE: Primary | ICD-10-CM

## 2019-08-14 PROCEDURE — D9220A PRA ANESTHESIA: Mod: CRNA,,, | Performed by: NURSE ANESTHETIST, CERTIFIED REGISTERED

## 2019-08-14 PROCEDURE — 88305 TISSUE EXAM BY PATHOLOGIST: CPT | Mod: 26,,, | Performed by: PATHOLOGY

## 2019-08-14 PROCEDURE — 45380 PR COLONOSCOPY,BIOPSY: ICD-10-PCS | Mod: 59,,, | Performed by: INTERNAL MEDICINE

## 2019-08-14 PROCEDURE — 27201012 HC FORCEPS, HOT/COLD, DISP: Performed by: INTERNAL MEDICINE

## 2019-08-14 PROCEDURE — 45380 COLONOSCOPY AND BIOPSY: CPT | Performed by: INTERNAL MEDICINE

## 2019-08-14 PROCEDURE — D9220A PRA ANESTHESIA: ICD-10-PCS | Mod: ANES,,, | Performed by: ANESTHESIOLOGY

## 2019-08-14 PROCEDURE — 37000009 HC ANESTHESIA EA ADD 15 MINS: Performed by: INTERNAL MEDICINE

## 2019-08-14 PROCEDURE — 45380 COLONOSCOPY AND BIOPSY: CPT | Mod: 59,,, | Performed by: INTERNAL MEDICINE

## 2019-08-14 PROCEDURE — D9220A PRA ANESTHESIA: Mod: ANES,,, | Performed by: ANESTHESIOLOGY

## 2019-08-14 PROCEDURE — 88305 TISSUE EXAM BY PATHOLOGIST: CPT | Mod: 59 | Performed by: PATHOLOGY

## 2019-08-14 PROCEDURE — 88305 TISSUE SPECIMEN TO PATHOLOGY - SURGERY: ICD-10-PCS | Mod: 26,,, | Performed by: PATHOLOGY

## 2019-08-14 PROCEDURE — 37000008 HC ANESTHESIA 1ST 15 MINUTES: Performed by: INTERNAL MEDICINE

## 2019-08-14 PROCEDURE — 63600175 PHARM REV CODE 636 W HCPCS: Performed by: NURSE ANESTHETIST, CERTIFIED REGISTERED

## 2019-08-14 PROCEDURE — 27201089 HC SNARE, DISP (ANY): Performed by: INTERNAL MEDICINE

## 2019-08-14 PROCEDURE — 25000003 PHARM REV CODE 250: Performed by: NURSE ANESTHETIST, CERTIFIED REGISTERED

## 2019-08-14 PROCEDURE — 45385 COLONOSCOPY W/LESION REMOVAL: CPT | Performed by: INTERNAL MEDICINE

## 2019-08-14 PROCEDURE — 45385 COLONOSCOPY W/LESION REMOVAL: CPT | Mod: ,,, | Performed by: INTERNAL MEDICINE

## 2019-08-14 PROCEDURE — 63600175 PHARM REV CODE 636 W HCPCS: Performed by: INTERNAL MEDICINE

## 2019-08-14 PROCEDURE — 45385 PR COLONOSCOPY,REMV LESN,SNARE: ICD-10-PCS | Mod: ,,, | Performed by: INTERNAL MEDICINE

## 2019-08-14 PROCEDURE — D9220A PRA ANESTHESIA: ICD-10-PCS | Mod: CRNA,,, | Performed by: NURSE ANESTHETIST, CERTIFIED REGISTERED

## 2019-08-14 RX ORDER — LIDOCAINE HCL/PF 100 MG/5ML
SYRINGE (ML) INTRAVENOUS
Status: DISCONTINUED | OUTPATIENT
Start: 2019-08-14 | End: 2019-08-14

## 2019-08-14 RX ORDER — GLYCOPYRROLATE 0.2 MG/ML
INJECTION INTRAMUSCULAR; INTRAVENOUS
Status: DISCONTINUED | OUTPATIENT
Start: 2019-08-14 | End: 2019-08-14

## 2019-08-14 RX ORDER — SODIUM CHLORIDE 9 MG/ML
INJECTION, SOLUTION INTRAVENOUS CONTINUOUS
Status: DISCONTINUED | OUTPATIENT
Start: 2019-08-14 | End: 2019-08-14 | Stop reason: HOSPADM

## 2019-08-14 RX ORDER — PROPOFOL 10 MG/ML
VIAL (ML) INTRAVENOUS
Status: DISCONTINUED | OUTPATIENT
Start: 2019-08-14 | End: 2019-08-14

## 2019-08-14 RX ORDER — ONDANSETRON 2 MG/ML
INJECTION INTRAMUSCULAR; INTRAVENOUS
Status: DISCONTINUED | OUTPATIENT
Start: 2019-08-14 | End: 2019-08-14

## 2019-08-14 RX ADMIN — PROPOFOL 30 MG: 10 INJECTION, EMULSION INTRAVENOUS at 08:08

## 2019-08-14 RX ADMIN — GLYCOPYRROLATE 0.2 MG: 0.2 INJECTION, SOLUTION INTRAMUSCULAR; INTRAVENOUS at 08:08

## 2019-08-14 RX ADMIN — PROPOFOL 100 MG: 10 INJECTION, EMULSION INTRAVENOUS at 08:08

## 2019-08-14 RX ADMIN — SODIUM CHLORIDE: 0.9 INJECTION, SOLUTION INTRAVENOUS at 07:08

## 2019-08-14 RX ADMIN — ONDANSETRON 4 MG: 2 INJECTION, SOLUTION INTRAMUSCULAR; INTRAVENOUS at 08:08

## 2019-08-14 RX ADMIN — LIDOCAINE HYDROCHLORIDE 50 MG: 20 INJECTION, SOLUTION INTRAVENOUS at 08:08

## 2019-08-14 NOTE — TRANSFER OF CARE
"Anesthesia Transfer of Care Note    Patient: Tha Guzman    Procedure(s) Performed: Procedure(s) (LRB):  COLONOSCOPY (N/A)    Patient location: PACU    Anesthesia Type: general    Transport from OR: Transported from OR on room air with adequate spontaneous ventilation    Post pain: adequate analgesia    Post assessment: no apparent anesthetic complications and tolerated procedure well    Post vital signs: stable    Level of consciousness: sedated    Nausea/Vomiting: no nausea/vomiting    Complications: none    Transfer of care protocol was followed      Last vitals:   Visit Vitals  /74 (BP Location: Left arm, Patient Position: Lying)   Pulse 61   Temp 36.7 °C (98.1 °F) (Skin)   Resp (!) 25   Ht 5' 3" (1.6 m)   Wt 75.8 kg (167 lb)   SpO2 (!) 92%   BMI 29.58 kg/m²     "

## 2019-08-14 NOTE — ANESTHESIA POSTPROCEDURE EVALUATION
Anesthesia Post Evaluation    Patient: Tha Guzman    Procedure(s) Performed: Procedure(s) (LRB):  COLONOSCOPY (N/A)    Final Anesthesia Type: general  Patient location during evaluation: PACU  Patient participation: Yes- Able to Participate  Level of consciousness: awake and alert and oriented  Post-procedure vital signs: reviewed and stable  Pain management: adequate  Airway patency: patent  PONV status at discharge: No PONV  Anesthetic complications: no      Cardiovascular status: blood pressure returned to baseline  Respiratory status: unassisted, spontaneous ventilation and room air  Hydration status: euvolemic  Follow-up not needed.          Vitals Value Taken Time   BP 83/51 8/14/2019  8:40 AM   Temp 36.7 °C (98.1 °F) 8/14/2019  7:39 AM   Pulse 61 8/14/2019  8:40 AM   Resp 25 8/14/2019  8:40 AM   SpO2 92 % 8/14/2019  8:40 AM         No case tracking events are documented in the log.      Pain/Nadir Score: No data recorded

## 2019-08-14 NOTE — [employer] PATIENT INSTRUCTIONS
Discharge Summary/Instructions after an Endoscopic Procedure  Patient Name: Tha Guzman  Patient MRN: [id number]  Patient YOB: 1940  Wednesday, August 14, 2019  Dominick Rivera MD  RESTRICTIONS:  During your procedure today, you received medications for sedation.  These   medications may affect your judgment, balance and coordination.  Therefore,   for 24 hours, you have the following restrictions:   - DO NOT drive a car, operate machinery, make legal/financial decisions,   sign important papers or drink alcohol.    ACTIVITY:  Today: no heavy lifting, straining or running due to procedural   sedation/anesthesia.  The following day: return to full activity including work.  DIET:  Eat and drink normally unless instructed otherwise.     TREATMENT FOR COMMON SIDE EFFECTS:  - Mild abdominal pain, nausea, belching, bloating or excessive gas:  rest,   eat lightly and use a heating pad.  - Sore Throat: treat with throat lozenges and/or gargle with warm salt   water.  - Because air was used during the procedure, expelling large amounts of air   from your rectum or belching is normal.  - If a bowel prep was taken, you may not have a bowel movement for 1-3 days.    This is normal.  SYMPTOMS TO WATCH FOR AND REPORT TO YOUR PHYSICIAN:  1. Abdominal pain or bloating, other than gas cramps.  2. Chest pain.  3. Back pain.  4. Signs of infection such as: chills or fever occurring within 24 hours   after the procedure.  5. Rectal bleeding, which would show as bright red, maroon, or black stools.   (A tablespoon of blood from the rectum is not serious, especially if   hemorrhoids are present.)  6. Vomiting.  7. Weakness or dizziness.  GO DIRECTLY TO THE NEAREST EMERGENCY ROOM IF YOU HAVE ANY OF THE FOLLOWING:      Difficulty breathing              Chills and/or fever over 101 F   Persistent vomiting and/or vomiting blood   Severe abdominal pain   Severe chest pain   Black, tarry stools   Bleeding- more than one  tablespoon   Any other symptom or condition that you feel may need urgent attention  Your doctor recommends these additional instructions:  If any biopsies were taken, your doctors clinic will contact you in 1 to 2   weeks with any results.  - Patient has a contact number available for emergencies.  The signs and   symptoms of potential delayed complications were discussed with the   patient.  Return to normal activities tomorrow.  Written discharge   instructions were provided to the patient.   - High fiber diet.   - Continue present medications.   - Await pathology results.   - Repeat colonoscopy in 3 years for surveillance.   - Discharge patient to home (ambulatory).   - Return to my office PRN.  For questions, problems or results please call your physician - Dominick Rivera MD at Work:  [phone].  [facility], EMERGENCY ROOM PHONE NUMBER: [phone]  IF A COMPLICATION OR EMERGENCY SITUATION ARISES AND YOU ARE UNABLE TO REACH   YOUR PHYSICIAN - GO DIRECTLY TO THE EMERGENCY ROOM.  Dominick Rivera MD  8/14/2019 8:41:35 AM  This report has been verified and signed electronically.  [employer]

## 2019-08-14 NOTE — PLAN OF CARE
Patient is being driven home by his son. Dr Hsieh spoke with both of them post procedure. Patient denies needing to use the bathroom. He declined anything to drink. He was brought to the car by Nathanael from patient transport via wheelchair.

## 2019-08-14 NOTE — INTERVAL H&P NOTE
The patient has been examined and the H&P has been reviewed:    I concur with the findings and no changes have occurred since H&P was written.    Anesthesia/Surgery risks, benefits and alternative options discussed and understood by patient/family.          Active Hospital Problems    Diagnosis  POA    Rectal bleeding [K62.5]  Yes      Resolved Hospital Problems   No resolved problems to display.

## 2019-08-14 NOTE — ANESTHESIA PREPROCEDURE EVALUATION
2019  Tha Guzman is a 79 y.o., male.    Anesthesia Evaluation    I have reviewed the Patient Summary Reports.    I have reviewed the Nursing Notes.   I have reviewed the Medications.     Review of Systems  Anesthesia Hx:  No problems with previous Anesthesia Denies Hx of Anesthetic complications    Social:  Non-Smoker, Former Smoker Smoking Status: Former Smoker - 40 pack years  Quit Smokin07  Smokeless Tobacco Status: Never Used  Alcohol use: Yes; 3.0 oz per week  Drug use: No       Cardiovascular:   Hypertension Denies MI.  Denies CAD.    Denies CABG/stent.   Denies Angina.    Pulmonary:   Denies COPD.  Denies Asthma.  Denies Recent URI.    Renal/:   Denies Chronic Renal Disease.     Hepatic/GI:   Denies GERD. Denies Liver Disease.    Musculoskeletal:   Arthritis     Neurological:   Denies TIA. Denies CVA. Denies Seizures.    Endocrine:   Denies Diabetes. Denies Hypothyroidism.    Psych:   Denies Psychiatric History.          Physical Exam  General:  Well nourished    Airway/Jaw/Neck:  Airway Findings: Mouth Opening: Normal Tongue: Normal  General Airway Assessment: Adult, Good  Mallampati: II  Improves to II with phonation.  TM Distance: 4-6 cm      Dental:  Dental Findings: In tact   Chest/Lungs:  Chest/Lungs Findings: Clear to auscultation, Normal Respiratory Rate     Heart/Vascular:  Heart Findings: Rate: Normal  Rhythm: Regular Rhythm  Sounds: Normal  Heart murmur: negative       Mental Status:  Mental Status Findings:  Cooperative, Alert and Oriented         Anesthesia Plan  Type of Anesthesia, risks & benefits discussed:  Anesthesia Type:  general  Patient's Preference:   Intra-op Monitoring Plan: standard ASA monitors  Intra-op Monitoring Plan Comments:   Post Op Pain Control Plan:   Post Op Pain Control Plan Comments:   Induction:   IV  Beta Blocker:  Patient is not currently  on a Beta-Blocker (No further documentation required).       Informed Consent: Patient understands risks and agrees with Anesthesia plan.  Questions answered. Anesthesia consent signed with patient.  ASA Score: 3     Day of Surgery Review of History & Physical: I have interviewed and examined the patient. I have reviewed the patient's H&P dated:  There are no significant changes.  H&P update referred to the surgeon.         Ready For Surgery From Anesthesia Perspective.

## 2019-08-15 VITALS
DIASTOLIC BLOOD PRESSURE: 69 MMHG | BODY MASS INDEX: 29.59 KG/M2 | SYSTOLIC BLOOD PRESSURE: 112 MMHG | OXYGEN SATURATION: 98 % | TEMPERATURE: 98 F | WEIGHT: 167 LBS | HEART RATE: 77 BPM | RESPIRATION RATE: 19 BRPM | HEIGHT: 63 IN

## 2019-08-19 ENCOUNTER — PATIENT MESSAGE (OUTPATIENT)
Dept: GASTROENTEROLOGY | Facility: CLINIC | Age: 79
End: 2019-08-19

## 2019-10-11 ENCOUNTER — PATIENT MESSAGE (OUTPATIENT)
Dept: UROLOGY | Facility: CLINIC | Age: 79
End: 2019-10-11

## 2019-10-14 DIAGNOSIS — N40.0 BENIGN PROSTATIC HYPERPLASIA WITHOUT LOWER URINARY TRACT SYMPTOMS: Primary | ICD-10-CM

## 2019-10-25 ENCOUNTER — OFFICE VISIT (OUTPATIENT)
Dept: FAMILY MEDICINE | Facility: CLINIC | Age: 79
End: 2019-10-25
Payer: MEDICARE

## 2019-10-25 ENCOUNTER — HOSPITAL ENCOUNTER (OUTPATIENT)
Dept: RADIOLOGY | Facility: CLINIC | Age: 79
Discharge: HOME | End: 2019-10-25
Attending: FAMILY MEDICINE
Payer: MEDICARE

## 2019-10-25 VITALS
WEIGHT: 163.56 LBS | BODY MASS INDEX: 28.98 KG/M2 | OXYGEN SATURATION: 97 % | HEART RATE: 92 BPM | DIASTOLIC BLOOD PRESSURE: 74 MMHG | SYSTOLIC BLOOD PRESSURE: 122 MMHG | HEIGHT: 63 IN | TEMPERATURE: 99 F

## 2019-10-25 DIAGNOSIS — Z00.00 WELLNESS EXAMINATION: Primary | ICD-10-CM

## 2019-10-25 DIAGNOSIS — M25.561 CHRONIC PAIN OF BOTH KNEES: ICD-10-CM

## 2019-10-25 DIAGNOSIS — M17.0 PRIMARY OSTEOARTHRITIS OF BOTH KNEES: ICD-10-CM

## 2019-10-25 DIAGNOSIS — I10 ESSENTIAL HYPERTENSION: ICD-10-CM

## 2019-10-25 DIAGNOSIS — Z13.6 ENCOUNTER FOR LIPID SCREENING FOR CARDIOVASCULAR DISEASE: ICD-10-CM

## 2019-10-25 DIAGNOSIS — M25.562 CHRONIC PAIN OF BOTH KNEES: ICD-10-CM

## 2019-10-25 DIAGNOSIS — G89.29 CHRONIC PAIN OF BOTH KNEES: ICD-10-CM

## 2019-10-25 DIAGNOSIS — Z13.220 ENCOUNTER FOR LIPID SCREENING FOR CARDIOVASCULAR DISEASE: ICD-10-CM

## 2019-10-25 DIAGNOSIS — Z79.899 CONTROLLED SUBSTANCE AGREEMENT SIGNED: ICD-10-CM

## 2019-10-25 PROCEDURE — 73562 X-RAY EXAM OF KNEE 3: CPT | Mod: TC,50,FY,PO

## 2019-10-25 PROCEDURE — 73562 X-RAY EXAM OF KNEE 3: CPT | Mod: 26,50,S$GLB, | Performed by: RADIOLOGY

## 2019-10-25 PROCEDURE — 3078F PR MOST RECENT DIASTOLIC BLOOD PRESSURE < 80 MM HG: ICD-10-PCS | Mod: CPTII,S$GLB,, | Performed by: FAMILY MEDICINE

## 2019-10-25 PROCEDURE — 3074F PR MOST RECENT SYSTOLIC BLOOD PRESSURE < 130 MM HG: ICD-10-PCS | Mod: CPTII,S$GLB,, | Performed by: FAMILY MEDICINE

## 2019-10-25 PROCEDURE — 99397 PR PREVENTIVE VISIT,EST,65 & OVER: ICD-10-PCS | Mod: S$GLB,,, | Performed by: FAMILY MEDICINE

## 2019-10-25 PROCEDURE — 3078F DIAST BP <80 MM HG: CPT | Mod: CPTII,S$GLB,, | Performed by: FAMILY MEDICINE

## 2019-10-25 PROCEDURE — 99999 PR PBB SHADOW E&M-EST. PATIENT-LVL IV: ICD-10-PCS | Mod: PBBFAC,,, | Performed by: FAMILY MEDICINE

## 2019-10-25 PROCEDURE — 99999 PR PBB SHADOW E&M-EST. PATIENT-LVL IV: CPT | Mod: PBBFAC,,, | Performed by: FAMILY MEDICINE

## 2019-10-25 PROCEDURE — 73562 XR KNEE ORTHO BILAT: ICD-10-PCS | Mod: 26,50,S$GLB, | Performed by: RADIOLOGY

## 2019-10-25 PROCEDURE — 3074F SYST BP LT 130 MM HG: CPT | Mod: CPTII,S$GLB,, | Performed by: FAMILY MEDICINE

## 2019-10-25 PROCEDURE — 99397 PER PM REEVAL EST PAT 65+ YR: CPT | Mod: S$GLB,,, | Performed by: FAMILY MEDICINE

## 2019-10-25 RX ORDER — CIPROFLOXACIN 500 MG/1
500 TABLET ORAL 2 TIMES DAILY
COMMUNITY
End: 2019-10-29 | Stop reason: SDUPTHER

## 2019-10-25 RX ORDER — HYDROCODONE BITARTRATE AND ACETAMINOPHEN 7.5; 325 MG/1; MG/1
TABLET ORAL
Qty: 90 TABLET | Refills: 0 | Status: SHIPPED | OUTPATIENT
Start: 2019-10-25 | End: 2020-04-17 | Stop reason: SDUPTHER

## 2019-10-25 RX ORDER — HYDROCORTISONE ACETATE 25 MG/1
SUPPOSITORY RECTAL
Status: ON HOLD | COMMUNITY
Start: 2019-08-14 | End: 2023-08-02 | Stop reason: SDUPTHER

## 2019-10-25 NOTE — PATIENT INSTRUCTIONS
Shingles Vaccination  Shingles vaccination is the only way to protect against shingles (herpes zoster), a painful rash of blisters most often wrapping around the side of the body. It is caused by the same virus that causes chickenpox. Shingrix is the preferred vaccine to prevent shingles and its complications. You are recommended to receive this vaccine at the age of 50 years. Anyone who received the previous shingles vaccine should be revaccinated with Shingrix. It is a 2-dose series  by 2 to 6 months.    Please consider getting tetanus shot

## 2019-10-25 NOTE — PROGRESS NOTES
Subjective:       Patient ID: Tha Guzman is a 79 y.o. male.    Chief Complaint: Establish Care    HPI    Presents to clinic to establish care. Would like today to be his annual exam.     Exercise: Does yard work  Diet: Admits his diet is unhealthy.     Past Medical History:   Diagnosis Date    Arthritis     Elevated PSA     Essential hypertension, benign     Mixed hyperlipidemia     Prostatitis, unspecified     Wears glasses        Past Surgical History:   Procedure Laterality Date    cataracts      COLONOSCOPY N/A 2019    Procedure: COLONOSCOPY;  Surgeon: Dominick Sr MD;  Location: [facility];  Service: Endoscopy;  Laterality: N/A;    HEMORRHOID SURGERY      KNEE ARTHROSCOPY W/ MENISCAL REPAIR Left     TONSILLECTOMY         Family History   Problem Relation Age of Onset    Cancer Brother         lunga cancer    Cancer Paternal Uncle         prostate cancer    Melanoma Neg Hx     Psoriasis Neg Hx     Lupus Neg Hx     Eczema Neg Hx        Social History     Tobacco Use    Smoking status: Former Smoker     Packs/day: 1.00     Years: 40.00     Pack years: 40.00     Types: Cigarettes     Last attempt to quit: 2007     Years since quittin.2    Smokeless tobacco: Never Used   Substance Use Topics    Alcohol use: Yes     Alcohol/week: 5.0 standard drinks     Types: 6 Standard drinks or equivalent per week     Frequency: 2-4 times a month     Drinks per session: 1 or 2     Binge frequency: Never     Comment: OCCASSIONALLY    Drug use: No       Social History     Substance and Sexual Activity   Sexual Activity Not Currently          Current Outpatient Medications:     amlodipine-benazepril 5-20 mg (LOTREL) 5-20 mg per capsule, Take 1 capsule by mouth once daily., Disp: 90 capsule, Rfl: 3    aspirin (ECOTRIN) 81 MG EC tablet, Take 81 mg by mouth once daily., Disp: , Rfl:     azelastine (OPTIVAR) 0.05 % ophthalmic solution, PLACE 1 DROP INTO BOTH EYES TWICE A DAY, Disp: , Rfl:  5    clobetasol (TEMOVATE) 0.05 % external solution, Thin film to AA scalp QHS PRN pruritus, Disp: 50 mL, Rfl: 3    HYDROcodone-acetaminophen (NORCO) 7.5-325 mg per tablet, 1 tablet PO TID prn severe pain, Disp: 90 tablet, Rfl: 0    hydrocortisone (ANUSOL-HC) 25 mg suppository, , Disp: , Rfl:     simvastatin (ZOCOR) 20 MG tablet, Take 1 tablet (20 mg total) by mouth every evening., Disp: 90 tablet, Rfl: 3    tamsulosin (FLOMAX) 0.4 mg Cap, Take 1 capsule (0.4 mg total) by mouth 2 (two) times daily., Disp: 180 capsule, Rfl: 3    ciprofloxacin HCl (CIPRO) 500 MG tablet, Take 1 tablet (500 mg total) by mouth 2 (two) times daily. (Patient not taking: Reported on 10/25/2019), Disp: 20 tablet, Rfl: 3    ciprofloxacin HCl (CIPRO) 500 MG tablet, Take 1 tablet (500 mg total) by mouth 2 (two) times daily. (Patient not taking: Reported on 10/25/2019), Disp: 20 tablet, Rfl: 0     Review of patient's allergies indicates:   Allergen Reactions    Aminoglycosides Other (See Comments)     Lips swelled    Meperidine Itching    Penicillins Itching            Review of Systems   Constitutional: Negative for activity change and unexpected weight change.   HENT: Negative for hearing loss, rhinorrhea and trouble swallowing.    Eyes: Negative for discharge and visual disturbance.   Respiratory: Negative for chest tightness and wheezing.    Cardiovascular: Negative for chest pain and palpitations.   Gastrointestinal: Negative for blood in stool, constipation, diarrhea and vomiting.   Endocrine: Negative for polydipsia.   Genitourinary: Negative for difficulty urinating and hematuria.   Musculoskeletal: Positive for arthralgias (Has chronic knee pain that he takes norco for) and joint swelling. Negative for neck pain.   Neurological: Negative for weakness and headaches.   Psychiatric/Behavioral: Negative for confusion and dysphoric mood.           Objective:          Vitals:    10/25/19 1033   BP: 122/74   Pulse: 92   Temp:  "98.6 °F (37 °C)   SpO2: 97%   Weight: 74.2 kg (163 lb 9.3 oz)   Height: 5' 3" (1.6 m)       Physical Exam   Constitutional: He appears well-developed and well-nourished. He is cooperative. No distress.   HENT:   Head: Normocephalic and atraumatic.   Right Ear: Hearing and external ear normal.   Left Ear: Hearing and external ear normal.   Eyes: Conjunctivae, EOM and lids are normal.   Cardiovascular: Normal rate, regular rhythm, normal heart sounds and normal pulses.   Pulmonary/Chest: Effort normal and breath sounds normal.   Abdominal: Soft. Normal appearance and bowel sounds are normal. There is no tenderness.   Musculoskeletal:        Right knee: He exhibits decreased range of motion.        Left knee: He exhibits decreased range of motion.   Neurological: He is alert.   Skin: Skin is warm. Capillary refill takes less than 2 seconds. No rash noted. No cyanosis.   Psychiatric: He has a normal mood and affect. His speech is normal and behavior is normal. Cognition and memory are normal.   Vitals reviewed.              Assessment/Plan     Tha was seen today for establish care.    Diagnoses and all orders for this visit:    Wellness examination    Controlled substance agreement signed  -     Pain Clinic Drug Screen; Future    Primary osteoarthritis of both knees  -     X-ray Knee Ortho Bilateral; Future  -     HYDROcodone-acetaminophen (NORCO) 7.5-325 mg per tablet; 1 tablet PO TID prn severe pain    Essential hypertension  -     CBC auto differential; Future  -     Comprehensive metabolic panel; Future  -     MICROALBUMIN / CREATININE RATIO URINE; Future  - Talked to patient about recent study linking ACE's to lung cancer. Patient would like think about it.     Encounter for lipid screening for cardiovascular disease  -     Lipid panel; Future      Follow up in about 6 months (around 4/25/2020) for bp monitoring.    Future Appointments   Date Time Provider Department Center   11/22/2019  9:45 AM Peyton Hawkins" MD 2C UROLOGY [place]   4/27/2020 10:40 AM Valeria Zimmerman MD [facility] [place]

## 2019-10-28 ENCOUNTER — PATIENT MESSAGE (OUTPATIENT)
Dept: FAMILY MEDICINE | Facility: CLINIC | Age: 79
End: 2019-10-28

## 2019-10-28 ENCOUNTER — TELEPHONE (OUTPATIENT)
Dept: FAMILY MEDICINE | Facility: CLINIC | Age: 79
End: 2019-10-28

## 2019-10-28 NOTE — TELEPHONE ENCOUNTER
Spoke to keisha with [facility] pharmacy states insurance would like to know if patient is taking norco for cancer pain notified that per the note and rx it is from osteoarthritis of both knees

## 2019-10-28 NOTE — TELEPHONE ENCOUNTER
----- Message from Maria Luisa Celis sent at 10/28/2019 12:40 PM CDT -----  Contact: Nica Yousif need to speak with a nurse regarding clarification on rx HYDROcodone-acetaminophen (NORCO) 7.5-325 mg per tablet. Please call back at [phone]

## 2019-10-29 ENCOUNTER — PATIENT MESSAGE (OUTPATIENT)
Dept: UROLOGY | Facility: CLINIC | Age: 79
End: 2019-10-29

## 2019-10-30 RX ORDER — CIPROFLOXACIN 500 MG/1
500 TABLET ORAL 2 TIMES DAILY
Qty: 30 TABLET | Refills: 1 | Status: SHIPPED | OUTPATIENT
Start: 2019-10-30 | End: 2020-06-25

## 2019-11-14 ENCOUNTER — HOSPITAL ENCOUNTER (EMERGENCY)
Facility: HOSPITAL | Age: 79
Discharge: HOME | End: 2019-11-14
Attending: EMERGENCY MEDICINE
Payer: MEDICARE

## 2019-11-14 VITALS
WEIGHT: 163 LBS | HEIGHT: 63 IN | DIASTOLIC BLOOD PRESSURE: 81 MMHG | RESPIRATION RATE: 18 BRPM | OXYGEN SATURATION: 97 % | SYSTOLIC BLOOD PRESSURE: 127 MMHG | BODY MASS INDEX: 28.88 KG/M2 | HEART RATE: 80 BPM | TEMPERATURE: 98 F

## 2019-11-14 DIAGNOSIS — J32.9 SINUSITIS, UNSPECIFIED CHRONICITY, UNSPECIFIED LOCATION: ICD-10-CM

## 2019-11-14 DIAGNOSIS — R04.0 EPISTAXIS: Primary | ICD-10-CM

## 2019-11-14 PROCEDURE — 99282 EMERGENCY DEPT VISIT SF MDM: CPT

## 2019-11-14 RX ORDER — CIPROFLOXACIN 500 MG/1
500 TABLET ORAL 2 TIMES DAILY
Qty: 14 TABLET | Refills: 0 | Status: SHIPPED | OUTPATIENT
Start: 2019-11-14 | End: 2019-11-21

## 2019-11-14 NOTE — ED PROVIDER NOTES
Encounter Date: 2019       History     Chief Complaint   Patient presents with    Epistaxis     after blowing nose     Patient presents complaining of nose bleed.  Patient states bleeding started this morning around 7:00 a.m..  Patient recently has had sinus congestion been blowing his nose frequently.  He denies any abnormal bruising.  He had recent blood work which was normal. No fever.        Review of patient's allergies indicates:   Allergen Reactions    Aminoglycosides Other (See Comments)     Lips swelled    Meperidine Itching    Penicillins Itching     Past Medical History:   Diagnosis Date    Arthritis     Elevated PSA     Essential hypertension, benign     Mixed hyperlipidemia     Prostatitis, unspecified     Wears glasses      Past Surgical History:   Procedure Laterality Date    cataracts      COLONOSCOPY N/A 2019    Procedure: COLONOSCOPY;  Surgeon: Dominick Sr MD;  Location: [facility];  Service: Endoscopy;  Laterality: N/A;    HEMORRHOID SURGERY      KNEE ARTHROSCOPY W/ MENISCAL REPAIR Left     TONSILLECTOMY       Family History   Problem Relation Age of Onset    Cancer Brother         lunga cancer    Cancer Paternal Uncle         prostate cancer    Melanoma Neg Hx     Psoriasis Neg Hx     Lupus Neg Hx     Eczema Neg Hx      Social History     Tobacco Use    Smoking status: Former Smoker     Packs/day: 1.00     Years: 40.00     Pack years: 40.00     Types: Cigarettes     Last attempt to quit: 2007     Years since quittin.3    Smokeless tobacco: Never Used   Substance Use Topics    Alcohol use: Yes     Alcohol/week: 5.0 standard drinks     Types: 6 Standard drinks or equivalent per week     Frequency: 2-4 times a month     Drinks per session: 1 or 2     Binge frequency: Never     Comment: OCCASSIONALLY    Drug use: No     Review of Systems   Constitutional: Negative for fatigue and fever.   HENT: Positive for nosebleeds.    All other systems reviewed  and are negative.      Physical Exam     Initial Vitals [11/14/19 0905]   BP Pulse Resp Temp SpO2   139/85 81 18 97.7 °F (36.5 °C) 97 %      MAP       --         Physical Exam    Nursing note and vitals reviewed.  Constitutional: He appears well-developed and well-nourished.   HENT:   Head: Normocephalic and atraumatic.   Mouth/Throat: Oropharynx is clear and moist.   There is a right anterior nosebleed.  There is minimal bleeding at this time.   Eyes: EOM are normal. Pupils are equal, round, and reactive to light.   Neck: Normal range of motion. Neck supple.   Cardiovascular: Normal rate, regular rhythm, normal heart sounds and intact distal pulses.   Pulmonary/Chest: Breath sounds normal. No respiratory distress.   Abdominal: Soft. Bowel sounds are normal.   Musculoskeletal: Normal range of motion. He exhibits no edema.   Neurological: He is alert and oriented to person, place, and time. He has normal strength. He displays normal reflexes. No cranial nerve deficit or sensory deficit.   Skin: Skin is warm.   Psychiatric: He has a normal mood and affect. His behavior is normal. Judgment and thought content normal.         ED Course   Procedures  Labs Reviewed - No data to display       Imaging Results    None          Medical Decision Making:   History:   Old Records Summarized: records from previous admission(s).       <> Summary of Records: Results for KARAN ALFREDO (MRN [id number]) as of 11/14/2019 09:37    10/25/2019 11:45  WBC: 6.74  RBC: 5.14  Hemoglobin: 15.9  Hematocrit: 46.7  MCV: 91  MCH: 30.9  MCHC: 34.0  RDW: 12.4  Platelets: 271    Differential Diagnosis:   Patient held pressure in the emergency department and had very minimal nose bleeding.  Acute interventions not needed.  Patient does have sinusitis he is allergic to penicillins.  Patient had recent blood work which was normal. Will treat with Cipro.  Patient be discharged stable condition. Detailed return precautions discussed.                                  Clinical Impression:       ICD-10-CM ICD-9-CM   1. Epistaxis R04.0 784.7   2. Sinusitis, unspecified chronicity, unspecified location J32.9 473.9                             Stew Huizar MD  11/14/19 0942       Stew Huizar MD  11/14/19 0958

## 2019-11-22 ENCOUNTER — OFFICE VISIT (OUTPATIENT)
Dept: UROLOGY | Facility: CLINIC | Age: 79
End: 2019-11-22
Payer: MEDICARE

## 2019-11-22 VITALS
HEIGHT: 63 IN | WEIGHT: 168 LBS | RESPIRATION RATE: 18 BRPM | BODY MASS INDEX: 29.77 KG/M2 | SYSTOLIC BLOOD PRESSURE: 143 MMHG | TEMPERATURE: 97 F | DIASTOLIC BLOOD PRESSURE: 88 MMHG | HEART RATE: 88 BPM

## 2019-11-22 DIAGNOSIS — R33.9 INCOMPLETE EMPTYING OF BLADDER: ICD-10-CM

## 2019-11-22 DIAGNOSIS — R97.20 ELEVATED PSA: ICD-10-CM

## 2019-11-22 DIAGNOSIS — R31.29 MICROSCOPIC HEMATURIA: Primary | ICD-10-CM

## 2019-11-22 DIAGNOSIS — N40.0 BENIGN PROSTATIC HYPERPLASIA, UNSPECIFIED WHETHER LOWER URINARY TRACT SYMPTOMS PRESENT: ICD-10-CM

## 2019-11-22 LAB
BILIRUB SERPL-MCNC: NORMAL MG/DL
BLOOD URINE, POC: NORMAL
COLOR, POC UA: YELLOW
GLUCOSE UR QL STRIP: NORMAL
KETONES UR QL STRIP: NORMAL
LEUKOCYTE ESTERASE URINE, POC: NORMAL
NITRITE, POC UA: NORMAL
PH, POC UA: 6.5
PROTEIN, POC: NORMAL
SPECIFIC GRAVITY, POC UA: 1.01
UROBILINOGEN, POC UA: 0.2

## 2019-11-22 PROCEDURE — 1101F PT FALLS ASSESS-DOCD LE1/YR: CPT | Mod: CPTII,S$GLB,, | Performed by: UROLOGY

## 2019-11-22 PROCEDURE — 3079F DIAST BP 80-89 MM HG: CPT | Mod: CPTII,S$GLB,, | Performed by: UROLOGY

## 2019-11-22 PROCEDURE — 99214 PR OFFICE/OUTPT VISIT, EST, LEVL IV, 30-39 MIN: ICD-10-PCS | Mod: 25,S$GLB,, | Performed by: UROLOGY

## 2019-11-22 PROCEDURE — 1159F PR MEDICATION LIST DOCUMENTED IN MEDICAL RECORD: ICD-10-PCS | Mod: S$GLB,,, | Performed by: UROLOGY

## 2019-11-22 PROCEDURE — 1159F MED LIST DOCD IN RCRD: CPT | Mod: S$GLB,,, | Performed by: UROLOGY

## 2019-11-22 PROCEDURE — 99214 OFFICE O/P EST MOD 30 MIN: CPT | Mod: 25,S$GLB,, | Performed by: UROLOGY

## 2019-11-22 PROCEDURE — 81002 URINALYSIS NONAUTO W/O SCOPE: CPT | Mod: S$GLB,,, | Performed by: UROLOGY

## 2019-11-22 PROCEDURE — 1126F PR PAIN SEVERITY QUANTIFIED, NO PAIN PRESENT: ICD-10-PCS | Mod: S$GLB,,, | Performed by: UROLOGY

## 2019-11-22 PROCEDURE — 1101F PR PT FALLS ASSESS DOC 0-1 FALLS W/OUT INJ PAST YR: ICD-10-PCS | Mod: CPTII,S$GLB,, | Performed by: UROLOGY

## 2019-11-22 PROCEDURE — 1126F AMNT PAIN NOTED NONE PRSNT: CPT | Mod: S$GLB,,, | Performed by: UROLOGY

## 2019-11-22 PROCEDURE — 51798 US URINE CAPACITY MEASURE: CPT | Mod: S$GLB,,, | Performed by: UROLOGY

## 2019-11-22 PROCEDURE — 99999 PR PBB SHADOW E&M-EST. PATIENT-LVL III: CPT | Mod: PBBFAC,,, | Performed by: UROLOGY

## 2019-11-22 PROCEDURE — 3077F SYST BP >= 140 MM HG: CPT | Mod: CPTII,S$GLB,, | Performed by: UROLOGY

## 2019-11-22 PROCEDURE — 3077F PR MOST RECENT SYSTOLIC BLOOD PRESSURE >= 140 MM HG: ICD-10-PCS | Mod: CPTII,S$GLB,, | Performed by: UROLOGY

## 2019-11-22 PROCEDURE — 51798 PR MEAS,POST-VOID RES,US,NON-IMAGING: ICD-10-PCS | Mod: S$GLB,,, | Performed by: UROLOGY

## 2019-11-22 PROCEDURE — 99999 PR PBB SHADOW E&M-EST. PATIENT-LVL III: ICD-10-PCS | Mod: PBBFAC,,, | Performed by: UROLOGY

## 2019-11-22 PROCEDURE — 81002 POCT URINE DIPSTICK WITHOUT MICROSCOPE: ICD-10-PCS | Mod: S$GLB,,, | Performed by: UROLOGY

## 2019-11-22 PROCEDURE — 3079F PR MOST RECENT DIASTOLIC BLOOD PRESSURE 80-89 MM HG: ICD-10-PCS | Mod: CPTII,S$GLB,, | Performed by: UROLOGY

## 2019-11-22 RX ORDER — ALFUZOSIN HYDROCHLORIDE 10 MG/1
10 TABLET, EXTENDED RELEASE ORAL
Qty: 30 TABLET | Refills: 11 | Status: SHIPPED | OUTPATIENT
Start: 2019-11-22 | End: 2020-10-26

## 2019-11-22 NOTE — PROGRESS NOTES
OFFICE NOTE  [unfilled]  [id number]  11/22/2019       CHIEF COMPLAINT:   BPH with lower urinary tract symptoms, history of microscopic hematuria     HISTORY OF PRESENT ILLNESS:   this 79-year-old male returns routine recheck.  He has a history of BPH with lower urinary tract symptoms for which he has been taking Flomax 0.4 mg p.o. b.i.d. but lately has not been as effective in his experiencing lower urinary tract symptoms.  He does have history microscopic hematuria for which he underwent a full workup in 2016 and only finding was that of bladder calculus and no other further problems since then    Medical history updated reveals no changes since his last visit of 05/28/2019    Physical exam:  Abdomen - soft benign nontender no masses no hernias no organomegaly                             External genitalia - normal phallus with adequate meatus testes descended and feel normal no scrotal mass                             Rectal - 35 g smooth prostate no nodules normal sphincter tone    Bladder scan - 190 mL postvoid residual     PSA  - 5.9 on 10/25/2019 that compares to 3.7 from 11 months prior     UA - negative with pH 6.5 and no evidence of hematuria     FINAL IMPRESSION:  BPH with lower urinary tract symptoms, elevated PSA , incomplete bladder emptying.       RECOMMENDATIONS:   trial on Uroxatral 10 mg p.o. q.d. in place of the Flomax.  Recheck 4-6 weeks and recheck PSA and postvoid residual.

## 2020-01-06 ENCOUNTER — LAB VISIT (OUTPATIENT)
Dept: LAB | Facility: HOSPITAL | Age: 80
End: 2020-01-06
Attending: UROLOGY
Payer: MEDICARE

## 2020-01-06 DIAGNOSIS — R97.20 ELEVATED PSA: ICD-10-CM

## 2020-01-06 LAB — COMPLEXED PSA SERPL-MCNC: 3.7 NG/ML (ref 0–4)

## 2020-01-06 PROCEDURE — 36415 COLL VENOUS BLD VENIPUNCTURE: CPT | Mod: PO

## 2020-01-06 PROCEDURE — 84153 ASSAY OF PSA TOTAL: CPT

## 2020-01-14 ENCOUNTER — OFFICE VISIT (OUTPATIENT)
Dept: UROLOGY | Facility: CLINIC | Age: 80
End: 2020-01-14
Payer: MEDICARE

## 2020-01-14 VITALS
HEIGHT: 63 IN | WEIGHT: 170.88 LBS | HEART RATE: 89 BPM | RESPIRATION RATE: 18 BRPM | DIASTOLIC BLOOD PRESSURE: 87 MMHG | TEMPERATURE: 98 F | BODY MASS INDEX: 30.28 KG/M2 | SYSTOLIC BLOOD PRESSURE: 126 MMHG

## 2020-01-14 DIAGNOSIS — R97.20 ELEVATED PROSTATE SPECIFIC ANTIGEN (PSA): ICD-10-CM

## 2020-01-14 DIAGNOSIS — R31.29 MICROSCOPIC HEMATURIA: Primary | ICD-10-CM

## 2020-01-14 DIAGNOSIS — N40.0 BENIGN PROSTATIC HYPERPLASIA, UNSPECIFIED WHETHER LOWER URINARY TRACT SYMPTOMS PRESENT: ICD-10-CM

## 2020-01-14 LAB
BILIRUB SERPL-MCNC: ABNORMAL MG/DL
BLOOD URINE, POC: ABNORMAL
COLOR, POC UA: YELLOW
GLUCOSE UR QL STRIP: ABNORMAL
KETONES UR QL STRIP: ABNORMAL
LEUKOCYTE ESTERASE URINE, POC: ABNORMAL
NITRITE, POC UA: ABNORMAL
PH, POC UA: 6.5
PROTEIN, POC: ABNORMAL
SPECIFIC GRAVITY, POC UA: 1
UROBILINOGEN, POC UA: 0.2

## 2020-01-14 PROCEDURE — 99214 PR OFFICE/OUTPT VISIT, EST, LEVL IV, 30-39 MIN: ICD-10-PCS | Mod: 25,S$GLB,, | Performed by: UROLOGY

## 2020-01-14 PROCEDURE — 88112 PR  CYTOPATH, CELL ENHANCE TECH: ICD-10-PCS | Mod: 26,,, | Performed by: PATHOLOGY

## 2020-01-14 PROCEDURE — 81002 POCT URINE DIPSTICK WITHOUT MICROSCOPE: ICD-10-PCS | Mod: S$GLB,,, | Performed by: UROLOGY

## 2020-01-14 PROCEDURE — 1126F AMNT PAIN NOTED NONE PRSNT: CPT | Mod: S$GLB,,, | Performed by: UROLOGY

## 2020-01-14 PROCEDURE — 88112 CYTOPATH CELL ENHANCE TECH: CPT | Mod: 26,,, | Performed by: PATHOLOGY

## 2020-01-14 PROCEDURE — 87086 URINE CULTURE/COLONY COUNT: CPT

## 2020-01-14 PROCEDURE — 3074F SYST BP LT 130 MM HG: CPT | Mod: CPTII,S$GLB,, | Performed by: UROLOGY

## 2020-01-14 PROCEDURE — 3079F PR MOST RECENT DIASTOLIC BLOOD PRESSURE 80-89 MM HG: ICD-10-PCS | Mod: CPTII,S$GLB,, | Performed by: UROLOGY

## 2020-01-14 PROCEDURE — 99999 PR PBB SHADOW E&M-EST. PATIENT-LVL III: ICD-10-PCS | Mod: PBBFAC,,, | Performed by: UROLOGY

## 2020-01-14 PROCEDURE — 3074F PR MOST RECENT SYSTOLIC BLOOD PRESSURE < 130 MM HG: ICD-10-PCS | Mod: CPTII,S$GLB,, | Performed by: UROLOGY

## 2020-01-14 PROCEDURE — 99214 OFFICE O/P EST MOD 30 MIN: CPT | Mod: 25,S$GLB,, | Performed by: UROLOGY

## 2020-01-14 PROCEDURE — 51798 PR MEAS,POST-VOID RES,US,NON-IMAGING: ICD-10-PCS | Mod: S$GLB,,, | Performed by: UROLOGY

## 2020-01-14 PROCEDURE — 81002 URINALYSIS NONAUTO W/O SCOPE: CPT | Mod: S$GLB,,, | Performed by: UROLOGY

## 2020-01-14 PROCEDURE — 99999 PR PBB SHADOW E&M-EST. PATIENT-LVL III: CPT | Mod: PBBFAC,,, | Performed by: UROLOGY

## 2020-01-14 PROCEDURE — 1159F MED LIST DOCD IN RCRD: CPT | Mod: S$GLB,,, | Performed by: UROLOGY

## 2020-01-14 PROCEDURE — 1101F PT FALLS ASSESS-DOCD LE1/YR: CPT | Mod: CPTII,S$GLB,, | Performed by: UROLOGY

## 2020-01-14 PROCEDURE — 3079F DIAST BP 80-89 MM HG: CPT | Mod: CPTII,S$GLB,, | Performed by: UROLOGY

## 2020-01-14 PROCEDURE — 1159F PR MEDICATION LIST DOCUMENTED IN MEDICAL RECORD: ICD-10-PCS | Mod: S$GLB,,, | Performed by: UROLOGY

## 2020-01-14 PROCEDURE — 1101F PR PT FALLS ASSESS DOC 0-1 FALLS W/OUT INJ PAST YR: ICD-10-PCS | Mod: CPTII,S$GLB,, | Performed by: UROLOGY

## 2020-01-14 PROCEDURE — 1126F PR PAIN SEVERITY QUANTIFIED, NO PAIN PRESENT: ICD-10-PCS | Mod: S$GLB,,, | Performed by: UROLOGY

## 2020-01-14 PROCEDURE — 51798 US URINE CAPACITY MEASURE: CPT | Mod: S$GLB,,, | Performed by: UROLOGY

## 2020-01-14 PROCEDURE — 88112 CYTOPATH CELL ENHANCE TECH: CPT | Performed by: PATHOLOGY

## 2020-01-14 RX ORDER — CIPROFLOXACIN 500 MG/1
500 TABLET ORAL 2 TIMES DAILY
Qty: 30 TABLET | Refills: 2 | Status: SHIPPED | OUTPATIENT
Start: 2020-01-14 | End: 2020-06-25

## 2020-01-15 LAB — BACTERIA UR CULT: NO GROWTH

## 2020-01-16 LAB — FINAL PATHOLOGIC DIAGNOSIS: NORMAL

## 2020-04-17 ENCOUNTER — PATIENT MESSAGE (OUTPATIENT)
Dept: FAMILY MEDICINE | Facility: CLINIC | Age: 80
End: 2020-04-17

## 2020-04-17 DIAGNOSIS — M17.0 PRIMARY OSTEOARTHRITIS OF BOTH KNEES: ICD-10-CM

## 2020-04-17 RX ORDER — HYDROCODONE BITARTRATE AND ACETAMINOPHEN 7.5; 325 MG/1; MG/1
TABLET ORAL
Qty: 90 TABLET | Refills: 0 | Status: SHIPPED | OUTPATIENT
Start: 2020-04-17 | End: 2020-10-14 | Stop reason: SDUPTHER

## 2020-04-17 NOTE — TELEPHONE ENCOUNTER
Rescheduled patients appointment to 7/31/20. Requests refill of norco. LR in 10/2019. Uses only PRN.

## 2020-04-23 DIAGNOSIS — Z01.84 ANTIBODY RESPONSE EXAMINATION: ICD-10-CM

## 2020-05-05 ENCOUNTER — PATIENT MESSAGE (OUTPATIENT)
Dept: ADMINISTRATIVE | Facility: HOSPITAL | Age: 80
End: 2020-05-05

## 2020-05-05 DIAGNOSIS — E78.5 HYPERLIPIDEMIA, UNSPECIFIED HYPERLIPIDEMIA TYPE: ICD-10-CM

## 2020-05-05 RX ORDER — SIMVASTATIN 20 MG/1
20 TABLET, FILM COATED ORAL NIGHTLY
Qty: 90 TABLET | Refills: 0 | Status: SHIPPED | OUTPATIENT
Start: 2020-05-05 | End: 2020-08-10

## 2020-05-23 DIAGNOSIS — Z01.84 ANTIBODY RESPONSE EXAMINATION: ICD-10-CM

## 2020-06-22 DIAGNOSIS — Z01.84 ANTIBODY RESPONSE EXAMINATION: ICD-10-CM

## 2020-06-25 ENCOUNTER — PATIENT MESSAGE (OUTPATIENT)
Dept: UROLOGY | Facility: CLINIC | Age: 80
End: 2020-06-25

## 2020-06-25 RX ORDER — CIPROFLOXACIN 500 MG/1
500 TABLET ORAL 2 TIMES DAILY
Qty: 30 TABLET | Refills: 2 | Status: SHIPPED | OUTPATIENT
Start: 2020-06-25 | End: 2021-01-20 | Stop reason: ALTCHOICE

## 2020-07-14 ENCOUNTER — PATIENT OUTREACH (OUTPATIENT)
Dept: ADMINISTRATIVE | Facility: OTHER | Age: 80
End: 2020-07-14

## 2020-07-14 NOTE — PROGRESS NOTES
Chart was reviewed for overdue Proactive [facility] Encounters (KEIKO)  topics  Updates were requested from care everywhere  Health Maintenance has been updated

## 2020-07-17 ENCOUNTER — OFFICE VISIT (OUTPATIENT)
Dept: UROLOGY | Facility: CLINIC | Age: 80
End: 2020-07-17
Payer: MEDICARE

## 2020-07-17 VITALS
SYSTOLIC BLOOD PRESSURE: 128 MMHG | DIASTOLIC BLOOD PRESSURE: 89 MMHG | RESPIRATION RATE: 18 BRPM | TEMPERATURE: 98 F | WEIGHT: 167.56 LBS | BODY MASS INDEX: 29.69 KG/M2 | HEIGHT: 63 IN | HEART RATE: 90 BPM

## 2020-07-17 DIAGNOSIS — R97.20 ELEVATED PSA: ICD-10-CM

## 2020-07-17 DIAGNOSIS — N40.0 BENIGN PROSTATIC HYPERPLASIA, UNSPECIFIED WHETHER LOWER URINARY TRACT SYMPTOMS PRESENT: ICD-10-CM

## 2020-07-17 DIAGNOSIS — R31.29 MICROSCOPIC HEMATURIA: Primary | ICD-10-CM

## 2020-07-17 LAB
BILIRUB SERPL-MCNC: NORMAL MG/DL
BLOOD URINE, POC: NORMAL
CLARITY, POC UA: CLEAR
COLOR, POC UA: YELLOW
GLUCOSE UR QL STRIP: NORMAL
KETONES UR QL STRIP: NORMAL
LEUKOCYTE ESTERASE URINE, POC: NORMAL
NITRITE, POC UA: NORMAL
PH, POC UA: 7
PROTEIN, POC: NORMAL
SPECIFIC GRAVITY, POC UA: 1.01
UROBILINOGEN, POC UA: 0.2

## 2020-07-17 PROCEDURE — 1159F MED LIST DOCD IN RCRD: CPT | Mod: S$GLB,,, | Performed by: UROLOGY

## 2020-07-17 PROCEDURE — 1101F PT FALLS ASSESS-DOCD LE1/YR: CPT | Mod: CPTII,S$GLB,, | Performed by: UROLOGY

## 2020-07-17 PROCEDURE — 81002 POCT URINE DIPSTICK WITHOUT MICROSCOPE: ICD-10-PCS | Mod: S$GLB,,, | Performed by: UROLOGY

## 2020-07-17 PROCEDURE — 3074F SYST BP LT 130 MM HG: CPT | Mod: CPTII,S$GLB,, | Performed by: UROLOGY

## 2020-07-17 PROCEDURE — 3074F PR MOST RECENT SYSTOLIC BLOOD PRESSURE < 130 MM HG: ICD-10-PCS | Mod: CPTII,S$GLB,, | Performed by: UROLOGY

## 2020-07-17 PROCEDURE — 99214 PR OFFICE/OUTPT VISIT, EST, LEVL IV, 30-39 MIN: ICD-10-PCS | Mod: 25,S$GLB,, | Performed by: UROLOGY

## 2020-07-17 PROCEDURE — 99214 OFFICE O/P EST MOD 30 MIN: CPT | Mod: 25,S$GLB,, | Performed by: UROLOGY

## 2020-07-17 PROCEDURE — 81002 URINALYSIS NONAUTO W/O SCOPE: CPT | Mod: S$GLB,,, | Performed by: UROLOGY

## 2020-07-17 PROCEDURE — 1159F PR MEDICATION LIST DOCUMENTED IN MEDICAL RECORD: ICD-10-PCS | Mod: S$GLB,,, | Performed by: UROLOGY

## 2020-07-17 PROCEDURE — 1126F PR PAIN SEVERITY QUANTIFIED, NO PAIN PRESENT: ICD-10-PCS | Mod: S$GLB,,, | Performed by: UROLOGY

## 2020-07-17 PROCEDURE — 3079F DIAST BP 80-89 MM HG: CPT | Mod: CPTII,S$GLB,, | Performed by: UROLOGY

## 2020-07-17 PROCEDURE — 1126F AMNT PAIN NOTED NONE PRSNT: CPT | Mod: S$GLB,,, | Performed by: UROLOGY

## 2020-07-17 PROCEDURE — 99999 PR PBB SHADOW E&M-EST. PATIENT-LVL IV: CPT | Mod: PBBFAC,,, | Performed by: UROLOGY

## 2020-07-17 PROCEDURE — 99999 PR PBB SHADOW E&M-EST. PATIENT-LVL IV: ICD-10-PCS | Mod: PBBFAC,,, | Performed by: UROLOGY

## 2020-07-17 PROCEDURE — 3079F PR MOST RECENT DIASTOLIC BLOOD PRESSURE 80-89 MM HG: ICD-10-PCS | Mod: CPTII,S$GLB,, | Performed by: UROLOGY

## 2020-07-17 PROCEDURE — 1101F PR PT FALLS ASSESS DOC 0-1 FALLS W/OUT INJ PAST YR: ICD-10-PCS | Mod: CPTII,S$GLB,, | Performed by: UROLOGY

## 2020-07-17 RX ORDER — CIPROFLOXACIN 500 MG/1
500 TABLET ORAL 2 TIMES DAILY
Qty: 30 TABLET | Refills: 6 | Status: SHIPPED | OUTPATIENT
Start: 2020-07-17 | End: 2020-11-30 | Stop reason: SDUPTHER

## 2020-07-17 NOTE — PROGRESS NOTES
OFFICE NOTE  [unfilled]  [id number]  7/17/2020       CHIEF COMPLAINT:   BPH with lower urinary tract symptoms, history microscopic hematuria, history of elevated PSA     HISTORY OF PRESENT ILLNESS:   this 80-year-old male returns routine recheck.  He has history BPH for which continues to take Uroxatral but he does state that he has difficulty at times emptying his bladder.  He had undergone a cystoscopy on 04/2016 which did reveal a large trilobar prostatic hyperplasia with also prostate calculi.  He also has undergone evaluation for hematuria for which underwent workup in 2016 revealed no significant findings.  He also has history elevated PSA that was up to 5.9 in the past but more recently was 3.7 on 01/16/2020.    Physical exam:  Abdomen - soft benign nontender no masses no hernias no organomegaly                             External genitalia - normal phallus with adequate meatus testes descended and feel normal no scrotal mass                             Rectal - 25 g smooth prostate no nodules normal sphincter tone    Bladder scan - 115 cc residual urine it although must be noted that the patient had last voided approximately an hour beforehand     PSA  - 3.7 on 01/06/2020     UA - negative pH 7.0 and no evidence of any blood     FINAL IMPRESSION:   BPH with lower urine tract symptoms.  History microscopic hematuria although none seen on today's evaluation.     RECOMMENDATIONS:  Discussion was had with the patient concerning management of his lower urinary tract symptoms and BPH.  He stated that he definitely does not want to undergo  any surgical procedure and also does not want to undergo cystoscopy at this time would rather continue on medical management including Cipro that he was prescribed 500 mg initially b.i.d. and then q.d..  Continue on Uroxatral 10 mg p.o. q.d..  Recheck 6 months or p.r.n..

## 2020-07-22 DIAGNOSIS — I10 ESSENTIAL HYPERTENSION: ICD-10-CM

## 2020-07-22 DIAGNOSIS — Z01.84 ANTIBODY RESPONSE EXAMINATION: ICD-10-CM

## 2020-07-22 RX ORDER — AMLODIPINE AND BENAZEPRIL HYDROCHLORIDE 5; 20 MG/1; MG/1
1 CAPSULE ORAL DAILY
Qty: 90 CAPSULE | Refills: 3 | Status: SHIPPED | OUTPATIENT
Start: 2020-07-22 | End: 2021-07-28 | Stop reason: SDUPTHER

## 2020-07-31 ENCOUNTER — OFFICE VISIT (OUTPATIENT)
Dept: FAMILY MEDICINE | Facility: CLINIC | Age: 80
End: 2020-07-31
Payer: MEDICARE

## 2020-07-31 VITALS
DIASTOLIC BLOOD PRESSURE: 78 MMHG | OXYGEN SATURATION: 96 % | TEMPERATURE: 98 F | HEIGHT: 63 IN | WEIGHT: 166.88 LBS | HEART RATE: 98 BPM | BODY MASS INDEX: 29.57 KG/M2 | SYSTOLIC BLOOD PRESSURE: 138 MMHG

## 2020-07-31 DIAGNOSIS — M25.561 CHRONIC PAIN OF BOTH KNEES: ICD-10-CM

## 2020-07-31 DIAGNOSIS — I10 ESSENTIAL HYPERTENSION: Primary | ICD-10-CM

## 2020-07-31 DIAGNOSIS — M25.562 CHRONIC PAIN OF BOTH KNEES: ICD-10-CM

## 2020-07-31 DIAGNOSIS — Z13.6 ENCOUNTER FOR LIPID SCREENING FOR CARDIOVASCULAR DISEASE: ICD-10-CM

## 2020-07-31 DIAGNOSIS — Z13.220 ENCOUNTER FOR LIPID SCREENING FOR CARDIOVASCULAR DISEASE: ICD-10-CM

## 2020-07-31 DIAGNOSIS — G89.29 CHRONIC PAIN OF BOTH KNEES: ICD-10-CM

## 2020-07-31 PROCEDURE — 1159F MED LIST DOCD IN RCRD: CPT | Mod: S$GLB,,, | Performed by: FAMILY MEDICINE

## 2020-07-31 PROCEDURE — 99999 PR PBB SHADOW E&M-EST. PATIENT-LVL IV: CPT | Mod: PBBFAC,,, | Performed by: FAMILY MEDICINE

## 2020-07-31 PROCEDURE — 3078F PR MOST RECENT DIASTOLIC BLOOD PRESSURE < 80 MM HG: ICD-10-PCS | Mod: CPTII,S$GLB,, | Performed by: FAMILY MEDICINE

## 2020-07-31 PROCEDURE — 99214 PR OFFICE/OUTPT VISIT, EST, LEVL IV, 30-39 MIN: ICD-10-PCS | Mod: S$GLB,,, | Performed by: FAMILY MEDICINE

## 2020-07-31 PROCEDURE — 1159F PR MEDICATION LIST DOCUMENTED IN MEDICAL RECORD: ICD-10-PCS | Mod: S$GLB,,, | Performed by: FAMILY MEDICINE

## 2020-07-31 PROCEDURE — 1101F PR PT FALLS ASSESS DOC 0-1 FALLS W/OUT INJ PAST YR: ICD-10-PCS | Mod: CPTII,S$GLB,, | Performed by: FAMILY MEDICINE

## 2020-07-31 PROCEDURE — 99999 PR PBB SHADOW E&M-EST. PATIENT-LVL IV: ICD-10-PCS | Mod: PBBFAC,,, | Performed by: FAMILY MEDICINE

## 2020-07-31 PROCEDURE — 3078F DIAST BP <80 MM HG: CPT | Mod: CPTII,S$GLB,, | Performed by: FAMILY MEDICINE

## 2020-07-31 PROCEDURE — 3075F SYST BP GE 130 - 139MM HG: CPT | Mod: CPTII,S$GLB,, | Performed by: FAMILY MEDICINE

## 2020-07-31 PROCEDURE — 99214 OFFICE O/P EST MOD 30 MIN: CPT | Mod: S$GLB,,, | Performed by: FAMILY MEDICINE

## 2020-07-31 PROCEDURE — 1101F PT FALLS ASSESS-DOCD LE1/YR: CPT | Mod: CPTII,S$GLB,, | Performed by: FAMILY MEDICINE

## 2020-07-31 PROCEDURE — 3075F PR MOST RECENT SYSTOLIC BLOOD PRESS GE 130-139MM HG: ICD-10-PCS | Mod: CPTII,S$GLB,, | Performed by: FAMILY MEDICINE

## 2020-07-31 NOTE — PROGRESS NOTES
Subjective:       Patient ID: Tha Guzman is a 80 y.o. male.    Chief Complaint: Follow-up    HPI     Mr. Guzman presents to clinic for check up. Currently has no complaints. Takes norco sporadically for chronic need (doesn't need refill). Controlled substance contract will  in October. Will try to sign pain contract today so he doesn't have to come back so early.       Past Medical History:   Diagnosis Date    Arthritis     Elevated PSA     Essential hypertension, benign     Mixed hyperlipidemia     Prostatitis, unspecified     Wears glasses        Past Surgical History:   Procedure Laterality Date    cataracts      COLONOSCOPY N/A 2019    Procedure: COLONOSCOPY;  Surgeon: Dominick Sr MD;  [facility];  Service: Endoscopy;  Laterality: N/A;    HEMORRHOID SURGERY      KNEE ARTHROSCOPY W/ MENISCAL REPAIR Left     TONSILLECTOMY         Family History   Problem Relation Age of Onset    Cancer Brother         lunga cancer    Cancer Paternal Uncle         prostate cancer    Melanoma Neg Hx     Psoriasis Neg Hx     Lupus Neg Hx     Eczema Neg Hx        Social History     Tobacco Use    Smoking status: Former Smoker     Packs/day: 1.00     Years: 40.00     Pack years: 40.00     Types: Cigarettes     Quit date: 2007     Years since quittin.0    Smokeless tobacco: Never Used   Substance Use Topics    Alcohol use: Yes     Alcohol/week: 5.0 standard drinks     Types: 6 Standard drinks or equivalent per week     Frequency: 2-4 times a month     Drinks per session: 1 or 2     Binge frequency: Never     Comment: OCCASSIONALLY    Drug use: No       Social History     Substance and Sexual Activity   Sexual Activity Not Currently          Current Outpatient Medications:     alfuzosin (UROXATRAL) 10 mg Tb24, Take 1 tablet (10 mg total) by mouth daily with breakfast., Disp: 30 tablet, Rfl: 11    amlodipine-benazepril 5-20 mg (LOTREL) 5-20 mg per capsule, Take 1 capsule  by mouth once daily., Disp: 90 capsule, Rfl: 3    aspirin (ECOTRIN) 81 MG EC tablet, Take 81 mg by mouth once daily., Disp: , Rfl:     ciprofloxacin HCl (CIPRO) 500 MG tablet, Take 1 tablet (500 mg total) by mouth 2 (two) times daily., Disp: 30 tablet, Rfl: 2    ciprofloxacin HCl (CIPRO) 500 MG tablet, Take 1 tablet (500 mg total) by mouth 2 (two) times daily., Disp: 30 tablet, Rfl: 6    HYDROcodone-acetaminophen (NORCO) 7.5-325 mg per tablet, 1 tablet PO TID prn severe pain, Disp: 90 tablet, Rfl: 0    hydrocortisone (ANUSOL-HC) 25 mg suppository, , Disp: , Rfl:     simvastatin (ZOCOR) 20 MG tablet, Take 1 tablet (20 mg total) by mouth every evening., Disp: 90 tablet, Rfl: 0    azelastine (OPTIVAR) 0.05 % ophthalmic solution, PLACE 1 DROP INTO BOTH EYES TWICE A DAY, Disp: , Rfl: 5    clobetasol (TEMOVATE) 0.05 % external solution, Thin film to AA scalp QHS PRN pruritus, Disp: 50 mL, Rfl: 3     Review of patient's allergies indicates:   Allergen Reactions    Aminoglycosides Other (See Comments)     Lips swelled    Meperidine Itching    Penicillins Itching            Review of Systems   Constitutional: Negative for activity change and unexpected weight change.   HENT: Negative for hearing loss, rhinorrhea and trouble swallowing.    Eyes: Negative for discharge and visual disturbance.   Respiratory: Negative for chest tightness and wheezing.    Cardiovascular: Negative for chest pain and palpitations.   Gastrointestinal: Negative for blood in stool, constipation, diarrhea and vomiting.   Endocrine: Negative for polydipsia.   Genitourinary: Negative for difficulty urinating and hematuria.        Sees Dr. Hawkins for urologic issues   Musculoskeletal: Positive for arthralgias (Knee pain). Negative for joint swelling and neck pain.   Neurological: Negative for headaches.   Psychiatric/Behavioral: Negative for confusion and dysphoric mood.           Objective:          Vitals:    07/31/20 0834   BP:  "138/78   Pulse: 98   Temp: 98.1 °F (36.7 °C)   SpO2: 96%   Weight: 75.7 kg (166 lb 14.2 oz)   Height: 5' 3" (1.6 m)       Physical Exam  Vitals signs reviewed.   Constitutional:       General: He is not in acute distress.     Appearance: Normal appearance. He is well-developed.   HENT:      Head: Normocephalic and atraumatic.      Right Ear: External ear normal.      Left Ear: External ear normal.   Eyes:      Conjunctiva/sclera: Conjunctivae normal.   Cardiovascular:      Rate and Rhythm: Normal rate and regular rhythm.      Pulses: Normal pulses.      Heart sounds: Normal heart sounds.   Pulmonary:      Effort: Pulmonary effort is normal.      Breath sounds: Normal breath sounds.   Abdominal:      General: Bowel sounds are normal.      Palpations: Abdomen is soft.      Tenderness: There is no abdominal tenderness.   Skin:     General: Skin is warm.      Findings: No rash.   Neurological:      General: No focal deficit present.      Mental Status: He is alert.   Psychiatric:         Mood and Affect: Mood normal.         Speech: Speech normal.         Behavior: Behavior normal. Behavior is cooperative.                 Assessment/Plan     Tha was seen today for follow-up.    Diagnoses and all orders for this visit:    Essential hypertension  -     Comprehensive metabolic panel; Future  -     Microalbumin/creatinine urine ratio; Future    Encounter for lipid screening for cardiovascular disease  -     Lipid Panel; Future    Chronic pain of both knees  -     To continue getting narcotics will sign controlled substance contract now.   - TOXICOLOGY SCREEN, URINE, RANDOM (COMPLIANCE); Future          Follow up in about 6 months (around 1/31/2021) for wellness    Future Appointments   Date Time Provider Department Center   1/14/2021 10:10 AM SHIRLEY AMANDA SAT [facility] [place]   1/20/2021 11:00 AM Peyton Hawkins MD [facility] UROLOGY [place]   1/29/2021  8:40 AM Valeria Zimmerman MD [facility] Shirley COUGHLIN " Erasmo MOSQUEDA  [facility]

## 2020-08-21 DIAGNOSIS — Z01.84 ANTIBODY RESPONSE EXAMINATION: ICD-10-CM

## 2020-09-20 DIAGNOSIS — Z01.84 ANTIBODY RESPONSE EXAMINATION: ICD-10-CM

## 2020-10-14 DIAGNOSIS — M17.0 PRIMARY OSTEOARTHRITIS OF BOTH KNEES: ICD-10-CM

## 2020-10-14 NOTE — TELEPHONE ENCOUNTER
Prescription refill request.   LOV: 07/31/2020  NOV: 01/29/2021  LDR: 04/17/2020  Last Labs: 07/31/2020

## 2020-10-15 RX ORDER — HYDROCODONE BITARTRATE AND ACETAMINOPHEN 7.5; 325 MG/1; MG/1
TABLET ORAL
Qty: 90 TABLET | Refills: 0 | Status: SHIPPED | OUTPATIENT
Start: 2020-10-15 | End: 2021-05-07 | Stop reason: SDUPTHER

## 2020-10-20 DIAGNOSIS — Z01.84 ANTIBODY RESPONSE EXAMINATION: ICD-10-CM

## 2020-11-19 DIAGNOSIS — Z01.84 ANTIBODY RESPONSE EXAMINATION: ICD-10-CM

## 2020-11-30 ENCOUNTER — PATIENT MESSAGE (OUTPATIENT)
Dept: UROLOGY | Facility: CLINIC | Age: 80
End: 2020-11-30

## 2020-12-01 RX ORDER — CIPROFLOXACIN 500 MG/1
500 TABLET ORAL 2 TIMES DAILY
Qty: 30 TABLET | Refills: 6 | Status: SHIPPED | OUTPATIENT
Start: 2020-12-01 | End: 2021-01-20 | Stop reason: ALTCHOICE

## 2021-01-06 ENCOUNTER — IMMUNIZATION (OUTPATIENT)
Dept: PRIMARY CARE CLINIC | Facility: CLINIC | Age: 81
End: 2021-01-06
Payer: MEDICARE

## 2021-01-06 DIAGNOSIS — Z23 NEED FOR VACCINATION: ICD-10-CM

## 2021-01-06 PROCEDURE — 91300 COVID-19, MRNA, LNP-S, PF, 30 MCG/0.3 ML DOSE VACCINE: ICD-10-PCS | Mod: S$GLB,,, | Performed by: FAMILY MEDICINE

## 2021-01-06 PROCEDURE — 0001A COVID-19, MRNA, LNP-S, PF, 30 MCG/0.3 ML DOSE VACCINE: CPT | Mod: S$GLB,,, | Performed by: FAMILY MEDICINE

## 2021-01-06 PROCEDURE — 0001A COVID-19, MRNA, LNP-S, PF, 30 MCG/0.3 ML DOSE VACCINE: ICD-10-PCS | Mod: S$GLB,,, | Performed by: FAMILY MEDICINE

## 2021-01-06 PROCEDURE — 91300 COVID-19, MRNA, LNP-S, PF, 30 MCG/0.3 ML DOSE VACCINE: CPT | Mod: S$GLB,,, | Performed by: FAMILY MEDICINE

## 2021-01-14 ENCOUNTER — LAB VISIT (OUTPATIENT)
Dept: LAB | Facility: HOSPITAL | Age: 81
End: 2021-01-14
Attending: UROLOGY
Payer: MEDICARE

## 2021-01-14 DIAGNOSIS — R97.20 ELEVATED PROSTATE SPECIFIC ANTIGEN (PSA): ICD-10-CM

## 2021-01-14 PROCEDURE — 36415 COLL VENOUS BLD VENIPUNCTURE: CPT | Mod: PO

## 2021-01-14 PROCEDURE — 84153 ASSAY OF PSA TOTAL: CPT

## 2021-01-15 LAB — COMPLEXED PSA SERPL-MCNC: 4.1 NG/ML (ref 0–4)

## 2021-01-19 ENCOUNTER — PATIENT OUTREACH (OUTPATIENT)
Dept: ADMINISTRATIVE | Facility: OTHER | Age: 81
End: 2021-01-19

## 2021-01-20 ENCOUNTER — OFFICE VISIT (OUTPATIENT)
Dept: UROLOGY | Facility: CLINIC | Age: 81
End: 2021-01-20
Payer: MEDICARE

## 2021-01-20 VITALS
SYSTOLIC BLOOD PRESSURE: 141 MMHG | HEIGHT: 63 IN | WEIGHT: 166.88 LBS | BODY MASS INDEX: 29.57 KG/M2 | RESPIRATION RATE: 18 BRPM | DIASTOLIC BLOOD PRESSURE: 93 MMHG | HEART RATE: 96 BPM

## 2021-01-20 DIAGNOSIS — N40.0 BENIGN PROSTATIC HYPERPLASIA, UNSPECIFIED WHETHER LOWER URINARY TRACT SYMPTOMS PRESENT: ICD-10-CM

## 2021-01-20 DIAGNOSIS — R31.29 MICROSCOPIC HEMATURIA: Primary | ICD-10-CM

## 2021-01-20 PROCEDURE — 99214 PR OFFICE/OUTPT VISIT, EST, LEVL IV, 30-39 MIN: ICD-10-PCS | Mod: 25,S$GLB,, | Performed by: UROLOGY

## 2021-01-20 PROCEDURE — 1101F PT FALLS ASSESS-DOCD LE1/YR: CPT | Mod: CPTII,S$GLB,, | Performed by: UROLOGY

## 2021-01-20 PROCEDURE — 99214 OFFICE O/P EST MOD 30 MIN: CPT | Mod: 25,S$GLB,, | Performed by: UROLOGY

## 2021-01-20 PROCEDURE — 88112 CYTOPATH CELL ENHANCE TECH: CPT | Performed by: PATHOLOGY

## 2021-01-20 PROCEDURE — 81000 URINALYSIS NONAUTO W/SCOPE: CPT | Mod: S$GLB,,, | Performed by: UROLOGY

## 2021-01-20 PROCEDURE — 51798 US URINE CAPACITY MEASURE: CPT | Mod: S$GLB,,, | Performed by: UROLOGY

## 2021-01-20 PROCEDURE — 3077F PR MOST RECENT SYSTOLIC BLOOD PRESSURE >= 140 MM HG: ICD-10-PCS | Mod: CPTII,S$GLB,, | Performed by: UROLOGY

## 2021-01-20 PROCEDURE — 1126F AMNT PAIN NOTED NONE PRSNT: CPT | Mod: S$GLB,,, | Performed by: UROLOGY

## 2021-01-20 PROCEDURE — 1101F PR PT FALLS ASSESS DOC 0-1 FALLS W/OUT INJ PAST YR: ICD-10-PCS | Mod: CPTII,S$GLB,, | Performed by: UROLOGY

## 2021-01-20 PROCEDURE — 1159F PR MEDICATION LIST DOCUMENTED IN MEDICAL RECORD: ICD-10-PCS | Mod: S$GLB,,, | Performed by: UROLOGY

## 2021-01-20 PROCEDURE — 51798 PR MEAS,POST-VOID RES,US,NON-IMAGING: ICD-10-PCS | Mod: S$GLB,,, | Performed by: UROLOGY

## 2021-01-20 PROCEDURE — 1126F PR PAIN SEVERITY QUANTIFIED, NO PAIN PRESENT: ICD-10-PCS | Mod: S$GLB,,, | Performed by: UROLOGY

## 2021-01-20 PROCEDURE — 99999 PR PBB SHADOW E&M-EST. PATIENT-LVL III: CPT | Mod: PBBFAC,,, | Performed by: UROLOGY

## 2021-01-20 PROCEDURE — 3288F PR FALLS RISK ASSESSMENT DOCUMENTED: ICD-10-PCS | Mod: CPTII,S$GLB,, | Performed by: UROLOGY

## 2021-01-20 PROCEDURE — 3080F DIAST BP >= 90 MM HG: CPT | Mod: CPTII,S$GLB,, | Performed by: UROLOGY

## 2021-01-20 PROCEDURE — 3077F SYST BP >= 140 MM HG: CPT | Mod: CPTII,S$GLB,, | Performed by: UROLOGY

## 2021-01-20 PROCEDURE — 1159F MED LIST DOCD IN RCRD: CPT | Mod: S$GLB,,, | Performed by: UROLOGY

## 2021-01-20 PROCEDURE — 88112 CYTOPATH CELL ENHANCE TECH: CPT | Mod: 26,,, | Performed by: PATHOLOGY

## 2021-01-20 PROCEDURE — 3080F PR MOST RECENT DIASTOLIC BLOOD PRESSURE >= 90 MM HG: ICD-10-PCS | Mod: CPTII,S$GLB,, | Performed by: UROLOGY

## 2021-01-20 PROCEDURE — 3288F FALL RISK ASSESSMENT DOCD: CPT | Mod: CPTII,S$GLB,, | Performed by: UROLOGY

## 2021-01-20 PROCEDURE — 99999 PR PBB SHADOW E&M-EST. PATIENT-LVL III: ICD-10-PCS | Mod: PBBFAC,,, | Performed by: UROLOGY

## 2021-01-20 PROCEDURE — 88112 PR  CYTOPATH, CELL ENHANCE TECH: ICD-10-PCS | Mod: 26,,, | Performed by: PATHOLOGY

## 2021-01-20 PROCEDURE — 81000 CHG URINALYSIS, NONAUTO, W/SCOPE: ICD-10-PCS | Mod: S$GLB,,, | Performed by: UROLOGY

## 2021-01-20 PROCEDURE — 87086 URINE CULTURE/COLONY COUNT: CPT

## 2021-01-22 LAB — BACTERIA UR CULT: NO GROWTH

## 2021-01-25 LAB — FINAL PATHOLOGIC DIAGNOSIS: NORMAL

## 2021-01-27 ENCOUNTER — IMMUNIZATION (OUTPATIENT)
Dept: PRIMARY CARE CLINIC | Facility: CLINIC | Age: 81
End: 2021-01-27
Payer: MEDICARE

## 2021-01-27 DIAGNOSIS — Z23 NEED FOR VACCINATION: Primary | ICD-10-CM

## 2021-01-27 PROCEDURE — 91300 COVID-19, MRNA, LNP-S, PF, 30 MCG/0.3 ML DOSE VACCINE: ICD-10-PCS | Mod: S$GLB,,, | Performed by: INTERNAL MEDICINE

## 2021-01-27 PROCEDURE — 91300 COVID-19, MRNA, LNP-S, PF, 30 MCG/0.3 ML DOSE VACCINE: CPT | Mod: S$GLB,,, | Performed by: INTERNAL MEDICINE

## 2021-01-27 PROCEDURE — 0002A COVID-19, MRNA, LNP-S, PF, 30 MCG/0.3 ML DOSE VACCINE: ICD-10-PCS | Mod: S$GLB,,, | Performed by: INTERNAL MEDICINE

## 2021-01-27 PROCEDURE — 0002A COVID-19, MRNA, LNP-S, PF, 30 MCG/0.3 ML DOSE VACCINE: CPT | Mod: S$GLB,,, | Performed by: INTERNAL MEDICINE

## 2021-03-09 ENCOUNTER — HOSPITAL ENCOUNTER (EMERGENCY)
Facility: HOSPITAL | Age: 81
Discharge: HOME | End: 2021-03-09
Attending: EMERGENCY MEDICINE
Payer: MEDICARE

## 2021-03-09 VITALS
OXYGEN SATURATION: 98 % | HEIGHT: 63 IN | TEMPERATURE: 99 F | DIASTOLIC BLOOD PRESSURE: 76 MMHG | BODY MASS INDEX: 30.12 KG/M2 | WEIGHT: 170 LBS | SYSTOLIC BLOOD PRESSURE: 134 MMHG | HEART RATE: 78 BPM | RESPIRATION RATE: 17 BRPM

## 2021-03-09 DIAGNOSIS — T14.8XXA ANIMAL BITE: Primary | ICD-10-CM

## 2021-03-09 DIAGNOSIS — Z20.3 NEED FOR POST EXPOSURE PROPHYLAXIS FOR RABIES: ICD-10-CM

## 2021-03-09 PROCEDURE — 99284 EMERGENCY DEPT VISIT MOD MDM: CPT | Mod: 25

## 2021-03-09 PROCEDURE — 90675 RABIES VACCINE IM: CPT | Mod: JG | Performed by: EMERGENCY MEDICINE

## 2021-03-09 PROCEDURE — 90714 TD VACC NO PRESV 7 YRS+ IM: CPT | Performed by: EMERGENCY MEDICINE

## 2021-03-09 PROCEDURE — 63600175 PHARM REV CODE 636 W HCPCS: Mod: JG | Performed by: EMERGENCY MEDICINE

## 2021-03-09 PROCEDURE — 96372 THER/PROPH/DIAG INJ SC/IM: CPT

## 2021-03-09 PROCEDURE — 63600175 PHARM REV CODE 636 W HCPCS: Performed by: EMERGENCY MEDICINE

## 2021-03-09 PROCEDURE — 90471 IMMUNIZATION ADMIN: CPT | Mod: JG | Performed by: EMERGENCY MEDICINE

## 2021-03-09 PROCEDURE — 90472 IMMUNIZATION ADMIN EACH ADD: CPT | Performed by: EMERGENCY MEDICINE

## 2021-03-09 PROCEDURE — 90375 RABIES IG IM/SC: CPT | Mod: JG | Performed by: EMERGENCY MEDICINE

## 2021-03-09 RX ADMIN — TETANUS AND DIPHTHERIA TOXOIDS ADSORBED 0.5 ML: 2; 2 INJECTION INTRAMUSCULAR at 11:03

## 2021-03-09 RX ADMIN — RABIES IMMUNE GLOBULIN (HUMAN) 1530 UNITS: 300 INJECTION, SOLUTION INFILTRATION; INTRAMUSCULAR at 11:03

## 2021-03-09 RX ADMIN — RABIES VACCINE 2.5 UNITS: KIT at 11:03

## 2021-03-23 ENCOUNTER — OFFICE VISIT (OUTPATIENT)
Dept: FAMILY MEDICINE | Facility: CLINIC | Age: 81
End: 2021-03-23
Payer: MEDICARE

## 2021-03-23 ENCOUNTER — PATIENT MESSAGE (OUTPATIENT)
Dept: FAMILY MEDICINE | Facility: CLINIC | Age: 81
End: 2021-03-23

## 2021-03-23 ENCOUNTER — HOSPITAL ENCOUNTER (OUTPATIENT)
Dept: RADIOLOGY | Facility: CLINIC | Age: 81
Discharge: HOME | End: 2021-03-23
Attending: STUDENT IN AN ORGANIZED HEALTH CARE EDUCATION/TRAINING PROGRAM
Payer: MEDICARE

## 2021-03-23 VITALS
HEART RATE: 96 BPM | WEIGHT: 166.25 LBS | TEMPERATURE: 98 F | SYSTOLIC BLOOD PRESSURE: 124 MMHG | BODY MASS INDEX: 29.46 KG/M2 | RESPIRATION RATE: 18 BRPM | DIASTOLIC BLOOD PRESSURE: 80 MMHG | OXYGEN SATURATION: 96 % | HEIGHT: 63 IN

## 2021-03-23 DIAGNOSIS — E78.5 HYPERLIPIDEMIA, UNSPECIFIED HYPERLIPIDEMIA TYPE: ICD-10-CM

## 2021-03-23 DIAGNOSIS — M75.100 TEAR OF ROTATOR CUFF, UNSPECIFIED LATERALITY, UNSPECIFIED TEAR EXTENT, UNSPECIFIED WHETHER TRAUMATIC: ICD-10-CM

## 2021-03-23 DIAGNOSIS — M75.100 TEAR OF ROTATOR CUFF, UNSPECIFIED LATERALITY, UNSPECIFIED TEAR EXTENT, UNSPECIFIED WHETHER TRAUMATIC: Primary | ICD-10-CM

## 2021-03-23 DIAGNOSIS — I10 ESSENTIAL HYPERTENSION: ICD-10-CM

## 2021-03-23 DIAGNOSIS — M25.511 RIGHT SHOULDER PAIN, UNSPECIFIED CHRONICITY: ICD-10-CM

## 2021-03-23 PROCEDURE — 3288F PR FALLS RISK ASSESSMENT DOCUMENTED: ICD-10-PCS | Mod: CPTII,S$GLB,, | Performed by: STUDENT IN AN ORGANIZED HEALTH CARE EDUCATION/TRAINING PROGRAM

## 2021-03-23 PROCEDURE — 3074F SYST BP LT 130 MM HG: CPT | Mod: CPTII,S$GLB,, | Performed by: STUDENT IN AN ORGANIZED HEALTH CARE EDUCATION/TRAINING PROGRAM

## 2021-03-23 PROCEDURE — 1101F PR PT FALLS ASSESS DOC 0-1 FALLS W/OUT INJ PAST YR: ICD-10-PCS | Mod: CPTII,S$GLB,, | Performed by: STUDENT IN AN ORGANIZED HEALTH CARE EDUCATION/TRAINING PROGRAM

## 2021-03-23 PROCEDURE — 1159F PR MEDICATION LIST DOCUMENTED IN MEDICAL RECORD: ICD-10-PCS | Mod: S$GLB,,, | Performed by: STUDENT IN AN ORGANIZED HEALTH CARE EDUCATION/TRAINING PROGRAM

## 2021-03-23 PROCEDURE — 1125F AMNT PAIN NOTED PAIN PRSNT: CPT | Mod: S$GLB,,, | Performed by: STUDENT IN AN ORGANIZED HEALTH CARE EDUCATION/TRAINING PROGRAM

## 2021-03-23 PROCEDURE — 99213 OFFICE O/P EST LOW 20 MIN: CPT | Mod: S$GLB,,, | Performed by: STUDENT IN AN ORGANIZED HEALTH CARE EDUCATION/TRAINING PROGRAM

## 2021-03-23 PROCEDURE — 99213 PR OFFICE/OUTPT VISIT, EST, LEVL III, 20-29 MIN: ICD-10-PCS | Mod: S$GLB,,, | Performed by: STUDENT IN AN ORGANIZED HEALTH CARE EDUCATION/TRAINING PROGRAM

## 2021-03-23 PROCEDURE — 3079F PR MOST RECENT DIASTOLIC BLOOD PRESSURE 80-89 MM HG: ICD-10-PCS | Mod: CPTII,S$GLB,, | Performed by: STUDENT IN AN ORGANIZED HEALTH CARE EDUCATION/TRAINING PROGRAM

## 2021-03-23 PROCEDURE — 73030 XR SHOULDER COMPLETE 2 OR MORE VIEWS RIGHT: ICD-10-PCS | Mod: 26,RT,S$GLB, | Performed by: RADIOLOGY

## 2021-03-23 PROCEDURE — 3074F PR MOST RECENT SYSTOLIC BLOOD PRESSURE < 130 MM HG: ICD-10-PCS | Mod: CPTII,S$GLB,, | Performed by: STUDENT IN AN ORGANIZED HEALTH CARE EDUCATION/TRAINING PROGRAM

## 2021-03-23 PROCEDURE — 1125F PR PAIN SEVERITY QUANTIFIED, PAIN PRESENT: ICD-10-PCS | Mod: S$GLB,,, | Performed by: STUDENT IN AN ORGANIZED HEALTH CARE EDUCATION/TRAINING PROGRAM

## 2021-03-23 PROCEDURE — 73030 X-RAY EXAM OF SHOULDER: CPT | Mod: 26,RT,S$GLB, | Performed by: RADIOLOGY

## 2021-03-23 PROCEDURE — 99999 PR PBB SHADOW E&M-EST. PATIENT-LVL V: CPT | Mod: PBBFAC,,, | Performed by: STUDENT IN AN ORGANIZED HEALTH CARE EDUCATION/TRAINING PROGRAM

## 2021-03-23 PROCEDURE — 3079F DIAST BP 80-89 MM HG: CPT | Mod: CPTII,S$GLB,, | Performed by: STUDENT IN AN ORGANIZED HEALTH CARE EDUCATION/TRAINING PROGRAM

## 2021-03-23 PROCEDURE — 99999 PR PBB SHADOW E&M-EST. PATIENT-LVL V: ICD-10-PCS | Mod: PBBFAC,,, | Performed by: STUDENT IN AN ORGANIZED HEALTH CARE EDUCATION/TRAINING PROGRAM

## 2021-03-23 PROCEDURE — 1101F PT FALLS ASSESS-DOCD LE1/YR: CPT | Mod: CPTII,S$GLB,, | Performed by: STUDENT IN AN ORGANIZED HEALTH CARE EDUCATION/TRAINING PROGRAM

## 2021-03-23 PROCEDURE — 1159F MED LIST DOCD IN RCRD: CPT | Mod: S$GLB,,, | Performed by: STUDENT IN AN ORGANIZED HEALTH CARE EDUCATION/TRAINING PROGRAM

## 2021-03-23 PROCEDURE — 3288F FALL RISK ASSESSMENT DOCD: CPT | Mod: CPTII,S$GLB,, | Performed by: STUDENT IN AN ORGANIZED HEALTH CARE EDUCATION/TRAINING PROGRAM

## 2021-03-23 PROCEDURE — 73030 X-RAY EXAM OF SHOULDER: CPT | Mod: TC,FY,PO,RT

## 2021-03-23 RX ORDER — CELECOXIB 200 MG/1
200 CAPSULE ORAL 2 TIMES DAILY PRN
Qty: 60 CAPSULE | Refills: 0 | Status: SHIPPED | OUTPATIENT
Start: 2021-03-23 | End: 2021-04-18

## 2021-03-23 RX ORDER — NORTRIPTYLINE HYDROCHLORIDE 25 MG/1
25 CAPSULE ORAL NIGHTLY
Qty: 30 CAPSULE | Refills: 0 | Status: SHIPPED | OUTPATIENT
Start: 2021-03-23 | End: 2021-04-14

## 2021-03-23 RX ORDER — METHYLPREDNISOLONE 4 MG/1
TABLET ORAL
Qty: 1 PACKAGE | Refills: 0 | Status: SHIPPED | OUTPATIENT
Start: 2021-03-23 | End: 2021-09-16

## 2021-03-25 ENCOUNTER — PATIENT OUTREACH (OUTPATIENT)
Dept: ADMINISTRATIVE | Facility: OTHER | Age: 81
End: 2021-03-25

## 2021-03-26 ENCOUNTER — HOSPITAL ENCOUNTER (OUTPATIENT)
Dept: RADIOLOGY | Facility: HOSPITAL | Age: 81
Discharge: HOME | End: 2021-03-26
Attending: STUDENT IN AN ORGANIZED HEALTH CARE EDUCATION/TRAINING PROGRAM
Payer: MEDICARE

## 2021-03-26 DIAGNOSIS — M75.100 TEAR OF ROTATOR CUFF, UNSPECIFIED LATERALITY, UNSPECIFIED TEAR EXTENT, UNSPECIFIED WHETHER TRAUMATIC: ICD-10-CM

## 2021-03-26 DIAGNOSIS — M25.511 RIGHT SHOULDER PAIN, UNSPECIFIED CHRONICITY: ICD-10-CM

## 2021-03-26 PROCEDURE — 73221 MRI SHOULDER WITHOUT CONTRAST RIGHT: ICD-10-PCS | Mod: 26,RT,, | Performed by: RADIOLOGY

## 2021-03-26 PROCEDURE — 73221 MRI JOINT UPR EXTREM W/O DYE: CPT | Mod: 26,RT,, | Performed by: RADIOLOGY

## 2021-03-26 PROCEDURE — 73221 MRI JOINT UPR EXTREM W/O DYE: CPT | Mod: TC,RT

## 2021-03-29 ENCOUNTER — OFFICE VISIT (OUTPATIENT)
Dept: ORTHOPEDICS | Facility: CLINIC | Age: 81
End: 2021-03-29
Payer: MEDICARE

## 2021-03-29 VITALS — BODY MASS INDEX: 29.41 KG/M2 | WEIGHT: 166 LBS | RESPIRATION RATE: 18 BRPM | HEIGHT: 63 IN

## 2021-03-29 DIAGNOSIS — M75.100 TEAR OF ROTATOR CUFF, UNSPECIFIED LATERALITY, UNSPECIFIED TEAR EXTENT, UNSPECIFIED WHETHER TRAUMATIC: ICD-10-CM

## 2021-03-29 PROCEDURE — 3288F PR FALLS RISK ASSESSMENT DOCUMENTED: ICD-10-PCS | Mod: CPTII,S$GLB,, | Performed by: ORTHOPAEDIC SURGERY

## 2021-03-29 PROCEDURE — 3288F FALL RISK ASSESSMENT DOCD: CPT | Mod: CPTII,S$GLB,, | Performed by: ORTHOPAEDIC SURGERY

## 2021-03-29 PROCEDURE — 99999 PR PBB SHADOW E&M-EST. PATIENT-LVL IV: CPT | Mod: PBBFAC,,, | Performed by: ORTHOPAEDIC SURGERY

## 2021-03-29 PROCEDURE — 1125F PR PAIN SEVERITY QUANTIFIED, PAIN PRESENT: ICD-10-PCS | Mod: S$GLB,,, | Performed by: ORTHOPAEDIC SURGERY

## 2021-03-29 PROCEDURE — 99203 OFFICE O/P NEW LOW 30 MIN: CPT | Mod: 25,S$GLB,, | Performed by: ORTHOPAEDIC SURGERY

## 2021-03-29 PROCEDURE — 1125F AMNT PAIN NOTED PAIN PRSNT: CPT | Mod: S$GLB,,, | Performed by: ORTHOPAEDIC SURGERY

## 2021-03-29 PROCEDURE — 1101F PR PT FALLS ASSESS DOC 0-1 FALLS W/OUT INJ PAST YR: ICD-10-PCS | Mod: CPTII,S$GLB,, | Performed by: ORTHOPAEDIC SURGERY

## 2021-03-29 PROCEDURE — 1159F PR MEDICATION LIST DOCUMENTED IN MEDICAL RECORD: ICD-10-PCS | Mod: S$GLB,,, | Performed by: ORTHOPAEDIC SURGERY

## 2021-03-29 PROCEDURE — 20610 DRAIN/INJ JOINT/BURSA W/O US: CPT | Mod: RT,S$GLB,, | Performed by: ORTHOPAEDIC SURGERY

## 2021-03-29 PROCEDURE — 99999 PR PBB SHADOW E&M-EST. PATIENT-LVL IV: ICD-10-PCS | Mod: PBBFAC,,, | Performed by: ORTHOPAEDIC SURGERY

## 2021-03-29 PROCEDURE — 20610 LARGE JOINT ASPIRATION/INJECTION: R SUBACROMIAL BURSA: ICD-10-PCS | Mod: RT,S$GLB,, | Performed by: ORTHOPAEDIC SURGERY

## 2021-03-29 PROCEDURE — 99203 PR OFFICE/OUTPT VISIT, NEW, LEVL III, 30-44 MIN: ICD-10-PCS | Mod: 25,S$GLB,, | Performed by: ORTHOPAEDIC SURGERY

## 2021-03-29 PROCEDURE — 1101F PT FALLS ASSESS-DOCD LE1/YR: CPT | Mod: CPTII,S$GLB,, | Performed by: ORTHOPAEDIC SURGERY

## 2021-03-29 PROCEDURE — 1159F MED LIST DOCD IN RCRD: CPT | Mod: S$GLB,,, | Performed by: ORTHOPAEDIC SURGERY

## 2021-03-29 RX ORDER — TRIAMCINOLONE ACETONIDE 40 MG/ML
40 INJECTION, SUSPENSION INTRA-ARTICULAR; INTRAMUSCULAR
Status: DISCONTINUED | OUTPATIENT
Start: 2021-03-29 | End: 2021-03-29 | Stop reason: HOSPADM

## 2021-03-29 RX ADMIN — TRIAMCINOLONE ACETONIDE 40 MG: 40 INJECTION, SUSPENSION INTRA-ARTICULAR; INTRAMUSCULAR at 10:03

## 2021-04-12 ENCOUNTER — OFFICE VISIT (OUTPATIENT)
Dept: ORTHOPEDICS | Facility: CLINIC | Age: 81
End: 2021-04-12
Payer: MEDICARE

## 2021-04-12 VITALS — BODY MASS INDEX: 29.41 KG/M2 | RESPIRATION RATE: 18 BRPM | WEIGHT: 166 LBS | HEIGHT: 63 IN

## 2021-04-12 DIAGNOSIS — M75.111 INCOMPLETE TEAR OF RIGHT ROTATOR CUFF, UNSPECIFIED WHETHER TRAUMATIC: Primary | ICD-10-CM

## 2021-04-12 PROCEDURE — 1101F PR PT FALLS ASSESS DOC 0-1 FALLS W/OUT INJ PAST YR: ICD-10-PCS | Mod: CPTII,S$GLB,, | Performed by: ORTHOPAEDIC SURGERY

## 2021-04-12 PROCEDURE — 3288F FALL RISK ASSESSMENT DOCD: CPT | Mod: CPTII,S$GLB,, | Performed by: ORTHOPAEDIC SURGERY

## 2021-04-12 PROCEDURE — 99999 PR PBB SHADOW E&M-EST. PATIENT-LVL IV: CPT | Mod: PBBFAC,,, | Performed by: ORTHOPAEDIC SURGERY

## 2021-04-12 PROCEDURE — 1101F PT FALLS ASSESS-DOCD LE1/YR: CPT | Mod: CPTII,S$GLB,, | Performed by: ORTHOPAEDIC SURGERY

## 2021-04-12 PROCEDURE — 1159F MED LIST DOCD IN RCRD: CPT | Mod: S$GLB,,, | Performed by: ORTHOPAEDIC SURGERY

## 2021-04-12 PROCEDURE — 99213 OFFICE O/P EST LOW 20 MIN: CPT | Mod: S$GLB,,, | Performed by: ORTHOPAEDIC SURGERY

## 2021-04-12 PROCEDURE — 3288F PR FALLS RISK ASSESSMENT DOCUMENTED: ICD-10-PCS | Mod: CPTII,S$GLB,, | Performed by: ORTHOPAEDIC SURGERY

## 2021-04-12 PROCEDURE — 99999 PR PBB SHADOW E&M-EST. PATIENT-LVL IV: ICD-10-PCS | Mod: PBBFAC,,, | Performed by: ORTHOPAEDIC SURGERY

## 2021-04-12 PROCEDURE — 99213 PR OFFICE/OUTPT VISIT, EST, LEVL III, 20-29 MIN: ICD-10-PCS | Mod: S$GLB,,, | Performed by: ORTHOPAEDIC SURGERY

## 2021-04-12 PROCEDURE — 1125F PR PAIN SEVERITY QUANTIFIED, PAIN PRESENT: ICD-10-PCS | Mod: S$GLB,,, | Performed by: ORTHOPAEDIC SURGERY

## 2021-04-12 PROCEDURE — 1159F PR MEDICATION LIST DOCUMENTED IN MEDICAL RECORD: ICD-10-PCS | Mod: S$GLB,,, | Performed by: ORTHOPAEDIC SURGERY

## 2021-04-12 PROCEDURE — 1125F AMNT PAIN NOTED PAIN PRSNT: CPT | Mod: S$GLB,,, | Performed by: ORTHOPAEDIC SURGERY

## 2021-04-19 ENCOUNTER — CLINICAL SUPPORT (OUTPATIENT)
Dept: REHABILITATION | Facility: HOSPITAL | Age: 81
End: 2021-04-19
Attending: ORTHOPAEDIC SURGERY
Payer: MEDICARE

## 2021-04-19 DIAGNOSIS — M25.511 PAIN IN JOINT OF RIGHT SHOULDER: Primary | ICD-10-CM

## 2021-04-19 DIAGNOSIS — M75.111 INCOMPLETE TEAR OF RIGHT ROTATOR CUFF, UNSPECIFIED WHETHER TRAUMATIC: ICD-10-CM

## 2021-04-19 PROCEDURE — 97110 THERAPEUTIC EXERCISES: CPT | Mod: PN

## 2021-04-19 PROCEDURE — 97161 PT EVAL LOW COMPLEX 20 MIN: CPT | Mod: PN

## 2021-04-27 ENCOUNTER — TELEPHONE (OUTPATIENT)
Dept: FAMILY MEDICINE | Facility: CLINIC | Age: 81
End: 2021-04-27

## 2021-04-27 ENCOUNTER — CLINICAL SUPPORT (OUTPATIENT)
Dept: REHABILITATION | Facility: HOSPITAL | Age: 81
End: 2021-04-27
Attending: ORTHOPAEDIC SURGERY
Payer: MEDICARE

## 2021-04-27 DIAGNOSIS — M75.111 INCOMPLETE TEAR OF RIGHT ROTATOR CUFF, UNSPECIFIED WHETHER TRAUMATIC: Primary | ICD-10-CM

## 2021-04-27 DIAGNOSIS — M25.511 PAIN IN JOINT OF RIGHT SHOULDER: ICD-10-CM

## 2021-04-27 PROCEDURE — 97110 THERAPEUTIC EXERCISES: CPT | Mod: PN,CQ

## 2021-05-04 ENCOUNTER — CLINICAL SUPPORT (OUTPATIENT)
Dept: REHABILITATION | Facility: HOSPITAL | Age: 81
End: 2021-05-04
Attending: ORTHOPAEDIC SURGERY
Payer: MEDICARE

## 2021-05-04 PROCEDURE — 97032 APPL MODALITY 1+ESTIM EA 15: CPT | Mod: PN

## 2021-05-04 PROCEDURE — 97110 THERAPEUTIC EXERCISES: CPT | Mod: PN

## 2021-05-04 PROCEDURE — 97010 HOT OR COLD PACKS THERAPY: CPT | Mod: PN

## 2021-05-06 ENCOUNTER — CLINICAL SUPPORT (OUTPATIENT)
Dept: REHABILITATION | Facility: HOSPITAL | Age: 81
End: 2021-05-06
Attending: ORTHOPAEDIC SURGERY
Payer: MEDICARE

## 2021-05-06 DIAGNOSIS — M25.511 PAIN IN JOINT OF RIGHT SHOULDER: ICD-10-CM

## 2021-05-06 DIAGNOSIS — M75.111 INCOMPLETE TEAR OF RIGHT ROTATOR CUFF, UNSPECIFIED WHETHER TRAUMATIC: Primary | ICD-10-CM

## 2021-05-06 PROCEDURE — 97014 ELECTRIC STIMULATION THERAPY: CPT | Mod: PN,CQ

## 2021-05-06 PROCEDURE — 97110 THERAPEUTIC EXERCISES: CPT | Mod: PN,CQ

## 2021-05-07 ENCOUNTER — LAB VISIT (OUTPATIENT)
Dept: LAB | Facility: HOSPITAL | Age: 81
End: 2021-05-07
Attending: STUDENT IN AN ORGANIZED HEALTH CARE EDUCATION/TRAINING PROGRAM
Payer: MEDICARE

## 2021-05-07 ENCOUNTER — OFFICE VISIT (OUTPATIENT)
Dept: FAMILY MEDICINE | Facility: CLINIC | Age: 81
End: 2021-05-07
Payer: MEDICARE

## 2021-05-07 VITALS
RESPIRATION RATE: 16 BRPM | DIASTOLIC BLOOD PRESSURE: 86 MMHG | BODY MASS INDEX: 29.41 KG/M2 | HEART RATE: 90 BPM | OXYGEN SATURATION: 96 % | HEIGHT: 63 IN | WEIGHT: 166 LBS | TEMPERATURE: 99 F | SYSTOLIC BLOOD PRESSURE: 134 MMHG

## 2021-05-07 DIAGNOSIS — M17.0 PRIMARY OSTEOARTHRITIS OF BOTH KNEES: ICD-10-CM

## 2021-05-07 DIAGNOSIS — I10 ESSENTIAL HYPERTENSION: ICD-10-CM

## 2021-05-07 DIAGNOSIS — R53.83 FATIGUE, UNSPECIFIED TYPE: ICD-10-CM

## 2021-05-07 DIAGNOSIS — E78.5 HYPERLIPIDEMIA, UNSPECIFIED HYPERLIPIDEMIA TYPE: ICD-10-CM

## 2021-05-07 DIAGNOSIS — Z00.00 HEALTHCARE MAINTENANCE: ICD-10-CM

## 2021-05-07 DIAGNOSIS — N40.0 BENIGN PROSTATIC HYPERPLASIA, UNSPECIFIED WHETHER LOWER URINARY TRACT SYMPTOMS PRESENT: ICD-10-CM

## 2021-05-07 DIAGNOSIS — M75.101 TEAR OF RIGHT ROTATOR CUFF, UNSPECIFIED TEAR EXTENT, UNSPECIFIED WHETHER TRAUMATIC: ICD-10-CM

## 2021-05-07 DIAGNOSIS — M17.0 OSTEOARTHRITIS OF BOTH KNEES, UNSPECIFIED OSTEOARTHRITIS TYPE: ICD-10-CM

## 2021-05-07 DIAGNOSIS — G47.00 INSOMNIA, UNSPECIFIED TYPE: ICD-10-CM

## 2021-05-07 DIAGNOSIS — Z00.00 HEALTHCARE MAINTENANCE: Primary | ICD-10-CM

## 2021-05-07 DIAGNOSIS — K58.9 IRRITABLE BOWEL SYNDROME, UNSPECIFIED TYPE: ICD-10-CM

## 2021-05-07 LAB
ALBUMIN SERPL BCP-MCNC: 3.9 G/DL (ref 3.5–5.2)
ALP SERPL-CCNC: 47 U/L (ref 55–135)
ALT SERPL W/O P-5'-P-CCNC: 14 U/L (ref 10–44)
ANION GAP SERPL CALC-SCNC: 8 MMOL/L (ref 8–16)
AST SERPL-CCNC: 21 U/L (ref 10–40)
BASOPHILS # BLD AUTO: 0.03 K/UL (ref 0–0.2)
BASOPHILS NFR BLD: 0.6 % (ref 0–1.9)
BILIRUB SERPL-MCNC: 0.7 MG/DL (ref 0.1–1)
BUN SERPL-MCNC: 16 MG/DL (ref 8–23)
CALCIUM SERPL-MCNC: 9.5 MG/DL (ref 8.7–10.5)
CHLORIDE SERPL-SCNC: 106 MMOL/L (ref 95–110)
CHOLEST SERPL-MCNC: 152 MG/DL (ref 120–199)
CHOLEST/HDLC SERPL: 2.8 {RATIO} (ref 2–5)
CO2 SERPL-SCNC: 25 MMOL/L (ref 23–29)
CREAT SERPL-MCNC: 0.8 MG/DL (ref 0.5–1.4)
DIFFERENTIAL METHOD: ABNORMAL
EOSINOPHIL # BLD AUTO: 0.1 K/UL (ref 0–0.5)
EOSINOPHIL NFR BLD: 2.1 % (ref 0–8)
ERYTHROCYTE [DISTWIDTH] IN BLOOD BY AUTOMATED COUNT: 13.2 % (ref 11.5–14.5)
EST. GFR  (AFRICAN AMERICAN): >60 ML/MIN/1.73 M^2
EST. GFR  (NON AFRICAN AMERICAN): >60 ML/MIN/1.73 M^2
GLUCOSE SERPL-MCNC: 90 MG/DL (ref 70–110)
HCT VFR BLD AUTO: 42.8 % (ref 40–54)
HDLC SERPL-MCNC: 54 MG/DL (ref 40–75)
HDLC SERPL: 35.5 % (ref 20–50)
HGB BLD-MCNC: 14.5 G/DL (ref 14–18)
IMM GRANULOCYTES # BLD AUTO: 0.03 K/UL (ref 0–0.04)
IMM GRANULOCYTES NFR BLD AUTO: 0.6 % (ref 0–0.5)
LDLC SERPL CALC-MCNC: 78.8 MG/DL (ref 63–159)
LYMPHOCYTES # BLD AUTO: 1.1 K/UL (ref 1–4.8)
LYMPHOCYTES NFR BLD: 22 % (ref 18–48)
MCH RBC QN AUTO: 30.9 PG (ref 27–31)
MCHC RBC AUTO-ENTMCNC: 33.9 G/DL (ref 32–36)
MCV RBC AUTO: 91 FL (ref 82–98)
MONOCYTES # BLD AUTO: 0.6 K/UL (ref 0.3–1)
MONOCYTES NFR BLD: 11.5 % (ref 4–15)
NEUTROPHILS # BLD AUTO: 3.3 K/UL (ref 1.8–7.7)
NEUTROPHILS NFR BLD: 63.2 % (ref 38–73)
NONHDLC SERPL-MCNC: 98 MG/DL
NRBC BLD-RTO: 0 /100 WBC
PLATELET # BLD AUTO: 228 K/UL (ref 150–450)
PMV BLD AUTO: 8.9 FL (ref 9.2–12.9)
POTASSIUM SERPL-SCNC: 4.1 MMOL/L (ref 3.5–5.1)
PROT SERPL-MCNC: 7.1 G/DL (ref 6–8.4)
RBC # BLD AUTO: 4.7 M/UL (ref 4.6–6.2)
SODIUM SERPL-SCNC: 139 MMOL/L (ref 136–145)
TRIGL SERPL-MCNC: 96 MG/DL (ref 30–150)
WBC # BLD AUTO: 5.14 K/UL (ref 3.9–12.7)

## 2021-05-07 PROCEDURE — 1125F AMNT PAIN NOTED PAIN PRSNT: CPT | Mod: S$GLB,,, | Performed by: STUDENT IN AN ORGANIZED HEALTH CARE EDUCATION/TRAINING PROGRAM

## 2021-05-07 PROCEDURE — 80061 LIPID PANEL: CPT | Performed by: STUDENT IN AN ORGANIZED HEALTH CARE EDUCATION/TRAINING PROGRAM

## 2021-05-07 PROCEDURE — 1159F MED LIST DOCD IN RCRD: CPT | Mod: S$GLB,,, | Performed by: STUDENT IN AN ORGANIZED HEALTH CARE EDUCATION/TRAINING PROGRAM

## 2021-05-07 PROCEDURE — 99999 PR PBB SHADOW E&M-EST. PATIENT-LVL IV: CPT | Mod: PBBFAC,,, | Performed by: STUDENT IN AN ORGANIZED HEALTH CARE EDUCATION/TRAINING PROGRAM

## 2021-05-07 PROCEDURE — 80053 COMPREHEN METABOLIC PANEL: CPT | Performed by: STUDENT IN AN ORGANIZED HEALTH CARE EDUCATION/TRAINING PROGRAM

## 2021-05-07 PROCEDURE — 1159F PR MEDICATION LIST DOCUMENTED IN MEDICAL RECORD: ICD-10-PCS | Mod: S$GLB,,, | Performed by: STUDENT IN AN ORGANIZED HEALTH CARE EDUCATION/TRAINING PROGRAM

## 2021-05-07 PROCEDURE — 99999 PR PBB SHADOW E&M-EST. PATIENT-LVL IV: ICD-10-PCS | Mod: PBBFAC,,, | Performed by: STUDENT IN AN ORGANIZED HEALTH CARE EDUCATION/TRAINING PROGRAM

## 2021-05-07 PROCEDURE — 1101F PR PT FALLS ASSESS DOC 0-1 FALLS W/OUT INJ PAST YR: ICD-10-PCS | Mod: CPTII,S$GLB,, | Performed by: STUDENT IN AN ORGANIZED HEALTH CARE EDUCATION/TRAINING PROGRAM

## 2021-05-07 PROCEDURE — 36415 COLL VENOUS BLD VENIPUNCTURE: CPT | Mod: PO | Performed by: STUDENT IN AN ORGANIZED HEALTH CARE EDUCATION/TRAINING PROGRAM

## 2021-05-07 PROCEDURE — 3288F FALL RISK ASSESSMENT DOCD: CPT | Mod: CPTII,S$GLB,, | Performed by: STUDENT IN AN ORGANIZED HEALTH CARE EDUCATION/TRAINING PROGRAM

## 2021-05-07 PROCEDURE — 1125F PR PAIN SEVERITY QUANTIFIED, PAIN PRESENT: ICD-10-PCS | Mod: S$GLB,,, | Performed by: STUDENT IN AN ORGANIZED HEALTH CARE EDUCATION/TRAINING PROGRAM

## 2021-05-07 PROCEDURE — 1101F PT FALLS ASSESS-DOCD LE1/YR: CPT | Mod: CPTII,S$GLB,, | Performed by: STUDENT IN AN ORGANIZED HEALTH CARE EDUCATION/TRAINING PROGRAM

## 2021-05-07 PROCEDURE — 99214 PR OFFICE/OUTPT VISIT, EST, LEVL IV, 30-39 MIN: ICD-10-PCS | Mod: S$GLB,,, | Performed by: STUDENT IN AN ORGANIZED HEALTH CARE EDUCATION/TRAINING PROGRAM

## 2021-05-07 PROCEDURE — 85025 COMPLETE CBC W/AUTO DIFF WBC: CPT | Performed by: STUDENT IN AN ORGANIZED HEALTH CARE EDUCATION/TRAINING PROGRAM

## 2021-05-07 PROCEDURE — 99214 OFFICE O/P EST MOD 30 MIN: CPT | Mod: S$GLB,,, | Performed by: STUDENT IN AN ORGANIZED HEALTH CARE EDUCATION/TRAINING PROGRAM

## 2021-05-07 PROCEDURE — 3288F PR FALLS RISK ASSESSMENT DOCUMENTED: ICD-10-PCS | Mod: CPTII,S$GLB,, | Performed by: STUDENT IN AN ORGANIZED HEALTH CARE EDUCATION/TRAINING PROGRAM

## 2021-05-07 RX ORDER — SULINDAC 150 MG/1
150 TABLET ORAL 2 TIMES DAILY PRN
Qty: 60 TABLET | Refills: 1 | Status: SHIPPED | OUTPATIENT
Start: 2021-05-07 | End: 2021-07-06

## 2021-05-07 RX ORDER — TRAZODONE HYDROCHLORIDE 50 MG/1
50 TABLET ORAL NIGHTLY PRN
Qty: 30 TABLET | Refills: 2 | Status: SHIPPED | OUTPATIENT
Start: 2021-05-07 | End: 2021-07-23

## 2021-05-07 RX ORDER — HYDROCODONE BITARTRATE AND ACETAMINOPHEN 7.5; 325 MG/1; MG/1
TABLET ORAL
Qty: 90 TABLET | Refills: 0 | Status: SHIPPED | OUTPATIENT
Start: 2021-05-07 | End: 2021-11-05 | Stop reason: SDUPTHER

## 2021-05-11 ENCOUNTER — CLINICAL SUPPORT (OUTPATIENT)
Dept: REHABILITATION | Facility: HOSPITAL | Age: 81
End: 2021-05-11
Payer: MEDICARE

## 2021-05-11 PROCEDURE — 97110 THERAPEUTIC EXERCISES: CPT | Mod: PN

## 2021-05-11 PROCEDURE — 97010 HOT OR COLD PACKS THERAPY: CPT | Mod: PN

## 2021-05-11 PROCEDURE — 97032 APPL MODALITY 1+ESTIM EA 15: CPT | Mod: PN

## 2021-05-13 ENCOUNTER — CLINICAL SUPPORT (OUTPATIENT)
Dept: REHABILITATION | Facility: HOSPITAL | Age: 81
End: 2021-05-13
Payer: MEDICARE

## 2021-05-13 DIAGNOSIS — M25.511 PAIN IN JOINT OF RIGHT SHOULDER: ICD-10-CM

## 2021-05-13 DIAGNOSIS — M75.111 INCOMPLETE TEAR OF RIGHT ROTATOR CUFF, UNSPECIFIED WHETHER TRAUMATIC: Primary | ICD-10-CM

## 2021-05-13 PROCEDURE — 97110 THERAPEUTIC EXERCISES: CPT | Mod: PN,CQ

## 2021-05-18 ENCOUNTER — TELEPHONE (OUTPATIENT)
Dept: REHABILITATION | Facility: HOSPITAL | Age: 81
End: 2021-05-18

## 2021-05-18 ENCOUNTER — DOCUMENTATION ONLY (OUTPATIENT)
Dept: REHABILITATION | Facility: HOSPITAL | Age: 81
End: 2021-05-18

## 2021-05-20 ENCOUNTER — CLINICAL SUPPORT (OUTPATIENT)
Dept: REHABILITATION | Facility: HOSPITAL | Age: 81
End: 2021-05-20
Payer: MEDICARE

## 2021-05-20 DIAGNOSIS — M75.111 INCOMPLETE TEAR OF RIGHT ROTATOR CUFF, UNSPECIFIED WHETHER TRAUMATIC: Primary | ICD-10-CM

## 2021-05-25 ENCOUNTER — CLINICAL SUPPORT (OUTPATIENT)
Dept: REHABILITATION | Facility: HOSPITAL | Age: 81
End: 2021-05-25
Payer: MEDICARE

## 2021-05-25 PROCEDURE — 97032 APPL MODALITY 1+ESTIM EA 15: CPT | Mod: PN

## 2021-05-25 PROCEDURE — 97110 THERAPEUTIC EXERCISES: CPT | Mod: PN

## 2021-05-25 PROCEDURE — 97010 HOT OR COLD PACKS THERAPY: CPT | Mod: PN

## 2021-05-27 ENCOUNTER — CLINICAL SUPPORT (OUTPATIENT)
Dept: REHABILITATION | Facility: HOSPITAL | Age: 81
End: 2021-05-27
Payer: MEDICARE

## 2021-05-27 PROCEDURE — 97110 THERAPEUTIC EXERCISES: CPT | Mod: PN

## 2021-05-27 PROCEDURE — 97032 APPL MODALITY 1+ESTIM EA 15: CPT | Mod: PN

## 2021-05-27 PROCEDURE — 97010 HOT OR COLD PACKS THERAPY: CPT | Mod: PN

## 2021-06-01 ENCOUNTER — CLINICAL SUPPORT (OUTPATIENT)
Dept: REHABILITATION | Facility: HOSPITAL | Age: 81
End: 2021-06-01
Payer: MEDICARE

## 2021-06-01 DIAGNOSIS — M75.111 INCOMPLETE TEAR OF RIGHT ROTATOR CUFF, UNSPECIFIED WHETHER TRAUMATIC: Primary | ICD-10-CM

## 2021-06-01 PROCEDURE — 97014 ELECTRIC STIMULATION THERAPY: CPT | Mod: PN,CQ

## 2021-06-01 PROCEDURE — 97110 THERAPEUTIC EXERCISES: CPT | Mod: PN,CQ

## 2021-06-03 ENCOUNTER — CLINICAL SUPPORT (OUTPATIENT)
Dept: REHABILITATION | Facility: HOSPITAL | Age: 81
End: 2021-06-03
Payer: MEDICARE

## 2021-06-03 PROCEDURE — 97110 THERAPEUTIC EXERCISES: CPT | Mod: PN

## 2021-07-28 ENCOUNTER — PATIENT MESSAGE (OUTPATIENT)
Dept: FAMILY MEDICINE | Facility: CLINIC | Age: 81
End: 2021-07-28

## 2021-07-28 DIAGNOSIS — I10 ESSENTIAL HYPERTENSION: ICD-10-CM

## 2021-07-28 RX ORDER — AMLODIPINE AND BENAZEPRIL HYDROCHLORIDE 5; 20 MG/1; MG/1
1 CAPSULE ORAL DAILY
Qty: 90 CAPSULE | Refills: 3 | Status: SHIPPED | OUTPATIENT
Start: 2021-07-28 | End: 2022-07-20

## 2021-09-14 ENCOUNTER — TELEPHONE (OUTPATIENT)
Dept: UROLOGY | Facility: CLINIC | Age: 81
End: 2021-09-14

## 2021-09-16 ENCOUNTER — OFFICE VISIT (OUTPATIENT)
Dept: UROLOGY | Facility: CLINIC | Age: 81
End: 2021-09-16
Payer: MEDICARE

## 2021-09-16 VITALS
BODY MASS INDEX: 29.41 KG/M2 | SYSTOLIC BLOOD PRESSURE: 150 MMHG | WEIGHT: 166 LBS | DIASTOLIC BLOOD PRESSURE: 93 MMHG | HEIGHT: 63 IN | HEART RATE: 90 BPM

## 2021-09-16 DIAGNOSIS — R36.1 BLOOD IN SEMEN: ICD-10-CM

## 2021-09-16 DIAGNOSIS — N41.9 PROSTATITIS, UNSPECIFIED PROSTATITIS TYPE: Primary | ICD-10-CM

## 2021-09-16 PROCEDURE — 1126F AMNT PAIN NOTED NONE PRSNT: CPT | Mod: CPTII,S$GLB,, | Performed by: NURSE PRACTITIONER

## 2021-09-16 PROCEDURE — 3080F PR MOST RECENT DIASTOLIC BLOOD PRESSURE >= 90 MM HG: ICD-10-PCS | Mod: CPTII,S$GLB,, | Performed by: NURSE PRACTITIONER

## 2021-09-16 PROCEDURE — 3077F SYST BP >= 140 MM HG: CPT | Mod: CPTII,S$GLB,, | Performed by: NURSE PRACTITIONER

## 2021-09-16 PROCEDURE — 1160F RVW MEDS BY RX/DR IN RCRD: CPT | Mod: CPTII,S$GLB,, | Performed by: NURSE PRACTITIONER

## 2021-09-16 PROCEDURE — 3080F DIAST BP >= 90 MM HG: CPT | Mod: CPTII,S$GLB,, | Performed by: NURSE PRACTITIONER

## 2021-09-16 PROCEDURE — 3288F PR FALLS RISK ASSESSMENT DOCUMENTED: ICD-10-PCS | Mod: CPTII,S$GLB,, | Performed by: NURSE PRACTITIONER

## 2021-09-16 PROCEDURE — 99999 PR PBB SHADOW E&M-EST. PATIENT-LVL IV: CPT | Mod: PBBFAC,,, | Performed by: NURSE PRACTITIONER

## 2021-09-16 PROCEDURE — 3077F PR MOST RECENT SYSTOLIC BLOOD PRESSURE >= 140 MM HG: ICD-10-PCS | Mod: CPTII,S$GLB,, | Performed by: NURSE PRACTITIONER

## 2021-09-16 PROCEDURE — 1101F PT FALLS ASSESS-DOCD LE1/YR: CPT | Mod: CPTII,S$GLB,, | Performed by: NURSE PRACTITIONER

## 2021-09-16 PROCEDURE — 1159F PR MEDICATION LIST DOCUMENTED IN MEDICAL RECORD: ICD-10-PCS | Mod: CPTII,S$GLB,, | Performed by: NURSE PRACTITIONER

## 2021-09-16 PROCEDURE — 1126F PR PAIN SEVERITY QUANTIFIED, NO PAIN PRESENT: ICD-10-PCS | Mod: CPTII,S$GLB,, | Performed by: NURSE PRACTITIONER

## 2021-09-16 PROCEDURE — 1101F PR PT FALLS ASSESS DOC 0-1 FALLS W/OUT INJ PAST YR: ICD-10-PCS | Mod: CPTII,S$GLB,, | Performed by: NURSE PRACTITIONER

## 2021-09-16 PROCEDURE — 99214 PR OFFICE/OUTPT VISIT, EST, LEVL IV, 30-39 MIN: ICD-10-PCS | Mod: S$GLB,,, | Performed by: NURSE PRACTITIONER

## 2021-09-16 PROCEDURE — 99214 OFFICE O/P EST MOD 30 MIN: CPT | Mod: S$GLB,,, | Performed by: NURSE PRACTITIONER

## 2021-09-16 PROCEDURE — 1159F MED LIST DOCD IN RCRD: CPT | Mod: CPTII,S$GLB,, | Performed by: NURSE PRACTITIONER

## 2021-09-16 PROCEDURE — 99999 PR PBB SHADOW E&M-EST. PATIENT-LVL IV: ICD-10-PCS | Mod: PBBFAC,,, | Performed by: NURSE PRACTITIONER

## 2021-09-16 PROCEDURE — 3288F FALL RISK ASSESSMENT DOCD: CPT | Mod: CPTII,S$GLB,, | Performed by: NURSE PRACTITIONER

## 2021-09-16 PROCEDURE — 1160F PR REVIEW ALL MEDS BY PRESCRIBER/CLIN PHARMACIST DOCUMENTED: ICD-10-PCS | Mod: CPTII,S$GLB,, | Performed by: NURSE PRACTITIONER

## 2021-09-16 RX ORDER — CIPROFLOXACIN 500 MG/1
TABLET ORAL
COMMUNITY
Start: 2021-07-23 | End: 2022-04-28 | Stop reason: ALTCHOICE

## 2021-09-16 RX ORDER — SULFAMETHOXAZOLE AND TRIMETHOPRIM 800; 160 MG/1; MG/1
1 TABLET ORAL 2 TIMES DAILY
Qty: 42 TABLET | Refills: 0 | Status: SHIPPED | OUTPATIENT
Start: 2021-09-16 | End: 2021-10-07

## 2021-09-17 RX ORDER — ALFUZOSIN HYDROCHLORIDE 10 MG/1
10 TABLET, EXTENDED RELEASE ORAL
Qty: 90 TABLET | Refills: 3 | Status: SHIPPED | OUTPATIENT
Start: 2021-09-17 | End: 2022-10-18

## 2021-10-12 ENCOUNTER — PATIENT OUTREACH (OUTPATIENT)
Dept: ADMINISTRATIVE | Facility: OTHER | Age: 81
End: 2021-10-12

## 2021-10-14 ENCOUNTER — OFFICE VISIT (OUTPATIENT)
Dept: UROLOGY | Facility: CLINIC | Age: 81
End: 2021-10-14
Payer: MEDICARE

## 2021-10-14 VITALS
RESPIRATION RATE: 18 BRPM | DIASTOLIC BLOOD PRESSURE: 86 MMHG | BODY MASS INDEX: 29.41 KG/M2 | WEIGHT: 166 LBS | HEIGHT: 63 IN | HEART RATE: 74 BPM | SYSTOLIC BLOOD PRESSURE: 143 MMHG

## 2021-10-14 DIAGNOSIS — N40.0 BENIGN PROSTATIC HYPERPLASIA, UNSPECIFIED WHETHER LOWER URINARY TRACT SYMPTOMS PRESENT: ICD-10-CM

## 2021-10-14 DIAGNOSIS — N41.9 PROSTATITIS, UNSPECIFIED PROSTATITIS TYPE: Primary | ICD-10-CM

## 2021-10-14 LAB
BACTERIA #/AREA URNS HPF: NORMAL /HPF
BILIRUB SERPL-MCNC: ABNORMAL MG/DL
BLOOD URINE, POC: ABNORMAL
CLARITY, POC UA: CLEAR
COLOR, POC UA: YELLOW
GLUCOSE UR QL STRIP: ABNORMAL
KETONES UR QL STRIP: ABNORMAL
LEUKOCYTE ESTERASE URINE, POC: ABNORMAL
MICROSCOPIC COMMENT: NORMAL
NITRITE, POC UA: ABNORMAL
PH, POC UA: 7
POC RESIDUAL URINE VOLUME: 74 ML (ref 0–100)
PROTEIN, POC: ABNORMAL
SPECIFIC GRAVITY, POC UA: 1.01
UROBILINOGEN, POC UA: 0.2

## 2021-10-14 PROCEDURE — 1101F PR PT FALLS ASSESS DOC 0-1 FALLS W/OUT INJ PAST YR: ICD-10-PCS | Mod: CPTII,S$GLB,, | Performed by: STUDENT IN AN ORGANIZED HEALTH CARE EDUCATION/TRAINING PROGRAM

## 2021-10-14 PROCEDURE — 3288F PR FALLS RISK ASSESSMENT DOCUMENTED: ICD-10-PCS | Mod: CPTII,S$GLB,, | Performed by: STUDENT IN AN ORGANIZED HEALTH CARE EDUCATION/TRAINING PROGRAM

## 2021-10-14 PROCEDURE — 99214 OFFICE O/P EST MOD 30 MIN: CPT | Mod: S$GLB,,, | Performed by: STUDENT IN AN ORGANIZED HEALTH CARE EDUCATION/TRAINING PROGRAM

## 2021-10-14 PROCEDURE — 51798 POCT BLADDER SCAN: ICD-10-PCS | Mod: S$GLB,,, | Performed by: STUDENT IN AN ORGANIZED HEALTH CARE EDUCATION/TRAINING PROGRAM

## 2021-10-14 PROCEDURE — 99999 PR PBB SHADOW E&M-EST. PATIENT-LVL III: CPT | Mod: PBBFAC,,, | Performed by: STUDENT IN AN ORGANIZED HEALTH CARE EDUCATION/TRAINING PROGRAM

## 2021-10-14 PROCEDURE — 88112 CYTOPATH CELL ENHANCE TECH: CPT | Mod: 26,,, | Performed by: PATHOLOGY

## 2021-10-14 PROCEDURE — 1126F AMNT PAIN NOTED NONE PRSNT: CPT | Mod: CPTII,S$GLB,, | Performed by: STUDENT IN AN ORGANIZED HEALTH CARE EDUCATION/TRAINING PROGRAM

## 2021-10-14 PROCEDURE — 81002 POCT URINE DIPSTICK WITHOUT MICROSCOPE: ICD-10-PCS | Mod: S$GLB,,, | Performed by: STUDENT IN AN ORGANIZED HEALTH CARE EDUCATION/TRAINING PROGRAM

## 2021-10-14 PROCEDURE — 3077F SYST BP >= 140 MM HG: CPT | Mod: CPTII,S$GLB,, | Performed by: STUDENT IN AN ORGANIZED HEALTH CARE EDUCATION/TRAINING PROGRAM

## 2021-10-14 PROCEDURE — 1159F MED LIST DOCD IN RCRD: CPT | Mod: CPTII,S$GLB,, | Performed by: STUDENT IN AN ORGANIZED HEALTH CARE EDUCATION/TRAINING PROGRAM

## 2021-10-14 PROCEDURE — 88112 CYTOPATH CELL ENHANCE TECH: CPT | Performed by: PATHOLOGY

## 2021-10-14 PROCEDURE — 81000 URINALYSIS NONAUTO W/SCOPE: CPT | Performed by: STUDENT IN AN ORGANIZED HEALTH CARE EDUCATION/TRAINING PROGRAM

## 2021-10-14 PROCEDURE — 1160F PR REVIEW ALL MEDS BY PRESCRIBER/CLIN PHARMACIST DOCUMENTED: ICD-10-PCS | Mod: CPTII,S$GLB,, | Performed by: STUDENT IN AN ORGANIZED HEALTH CARE EDUCATION/TRAINING PROGRAM

## 2021-10-14 PROCEDURE — 99214 PR OFFICE/OUTPT VISIT, EST, LEVL IV, 30-39 MIN: ICD-10-PCS | Mod: S$GLB,,, | Performed by: STUDENT IN AN ORGANIZED HEALTH CARE EDUCATION/TRAINING PROGRAM

## 2021-10-14 PROCEDURE — 3288F FALL RISK ASSESSMENT DOCD: CPT | Mod: CPTII,S$GLB,, | Performed by: STUDENT IN AN ORGANIZED HEALTH CARE EDUCATION/TRAINING PROGRAM

## 2021-10-14 PROCEDURE — 88112 PR  CYTOPATH, CELL ENHANCE TECH: ICD-10-PCS | Mod: 26,,, | Performed by: PATHOLOGY

## 2021-10-14 PROCEDURE — 3077F PR MOST RECENT SYSTOLIC BLOOD PRESSURE >= 140 MM HG: ICD-10-PCS | Mod: CPTII,S$GLB,, | Performed by: STUDENT IN AN ORGANIZED HEALTH CARE EDUCATION/TRAINING PROGRAM

## 2021-10-14 PROCEDURE — 3079F PR MOST RECENT DIASTOLIC BLOOD PRESSURE 80-89 MM HG: ICD-10-PCS | Mod: CPTII,S$GLB,, | Performed by: STUDENT IN AN ORGANIZED HEALTH CARE EDUCATION/TRAINING PROGRAM

## 2021-10-14 PROCEDURE — 1159F PR MEDICATION LIST DOCUMENTED IN MEDICAL RECORD: ICD-10-PCS | Mod: CPTII,S$GLB,, | Performed by: STUDENT IN AN ORGANIZED HEALTH CARE EDUCATION/TRAINING PROGRAM

## 2021-10-14 PROCEDURE — 1101F PT FALLS ASSESS-DOCD LE1/YR: CPT | Mod: CPTII,S$GLB,, | Performed by: STUDENT IN AN ORGANIZED HEALTH CARE EDUCATION/TRAINING PROGRAM

## 2021-10-14 PROCEDURE — 99999 PR PBB SHADOW E&M-EST. PATIENT-LVL III: ICD-10-PCS | Mod: PBBFAC,,, | Performed by: STUDENT IN AN ORGANIZED HEALTH CARE EDUCATION/TRAINING PROGRAM

## 2021-10-14 PROCEDURE — 81002 URINALYSIS NONAUTO W/O SCOPE: CPT | Mod: S$GLB,,, | Performed by: STUDENT IN AN ORGANIZED HEALTH CARE EDUCATION/TRAINING PROGRAM

## 2021-10-14 PROCEDURE — 1160F RVW MEDS BY RX/DR IN RCRD: CPT | Mod: CPTII,S$GLB,, | Performed by: STUDENT IN AN ORGANIZED HEALTH CARE EDUCATION/TRAINING PROGRAM

## 2021-10-14 PROCEDURE — 51798 US URINE CAPACITY MEASURE: CPT | Mod: S$GLB,,, | Performed by: STUDENT IN AN ORGANIZED HEALTH CARE EDUCATION/TRAINING PROGRAM

## 2021-10-14 PROCEDURE — 1126F PR PAIN SEVERITY QUANTIFIED, NO PAIN PRESENT: ICD-10-PCS | Mod: CPTII,S$GLB,, | Performed by: STUDENT IN AN ORGANIZED HEALTH CARE EDUCATION/TRAINING PROGRAM

## 2021-10-14 PROCEDURE — 3079F DIAST BP 80-89 MM HG: CPT | Mod: CPTII,S$GLB,, | Performed by: STUDENT IN AN ORGANIZED HEALTH CARE EDUCATION/TRAINING PROGRAM

## 2021-10-14 RX ORDER — FINASTERIDE 5 MG/1
5 TABLET, FILM COATED ORAL DAILY
Qty: 30 TABLET | Refills: 11 | Status: SHIPPED | OUTPATIENT
Start: 2021-10-14 | End: 2022-10-03

## 2021-10-19 LAB
FINAL PATHOLOGIC DIAGNOSIS: NORMAL
Lab: NORMAL

## 2021-11-05 ENCOUNTER — OFFICE VISIT (OUTPATIENT)
Dept: FAMILY MEDICINE | Facility: CLINIC | Age: 81
End: 2021-11-05
Payer: MEDICARE

## 2021-11-05 VITALS
BODY MASS INDEX: 28.52 KG/M2 | RESPIRATION RATE: 16 BRPM | DIASTOLIC BLOOD PRESSURE: 76 MMHG | HEART RATE: 89 BPM | OXYGEN SATURATION: 96 % | HEIGHT: 63 IN | WEIGHT: 160.94 LBS | SYSTOLIC BLOOD PRESSURE: 134 MMHG | TEMPERATURE: 99 F

## 2021-11-05 DIAGNOSIS — Z00.00 VISIT FOR PREVENTIVE HEALTH EXAMINATION: Primary | ICD-10-CM

## 2021-11-05 DIAGNOSIS — M17.0 PRIMARY OSTEOARTHRITIS OF BOTH KNEES: ICD-10-CM

## 2021-11-05 DIAGNOSIS — E78.5 HYPERLIPIDEMIA, UNSPECIFIED HYPERLIPIDEMIA TYPE: ICD-10-CM

## 2021-11-05 DIAGNOSIS — G47.00 INSOMNIA, UNSPECIFIED TYPE: ICD-10-CM

## 2021-11-05 DIAGNOSIS — I10 ESSENTIAL HYPERTENSION: ICD-10-CM

## 2021-11-05 DIAGNOSIS — R79.9 ABNORMAL FINDING OF BLOOD CHEMISTRY, UNSPECIFIED: ICD-10-CM

## 2021-11-05 DIAGNOSIS — Z23 NEEDS FLU SHOT: ICD-10-CM

## 2021-11-05 PROCEDURE — 90694 VACC AIIV4 NO PRSRV 0.5ML IM: CPT | Mod: S$GLB,,, | Performed by: STUDENT IN AN ORGANIZED HEALTH CARE EDUCATION/TRAINING PROGRAM

## 2021-11-05 PROCEDURE — 3075F PR MOST RECENT SYSTOLIC BLOOD PRESS GE 130-139MM HG: ICD-10-PCS | Mod: CPTII,S$GLB,, | Performed by: STUDENT IN AN ORGANIZED HEALTH CARE EDUCATION/TRAINING PROGRAM

## 2021-11-05 PROCEDURE — 99397 PER PM REEVAL EST PAT 65+ YR: CPT | Mod: S$GLB,,, | Performed by: STUDENT IN AN ORGANIZED HEALTH CARE EDUCATION/TRAINING PROGRAM

## 2021-11-05 PROCEDURE — G0008 ADMIN INFLUENZA VIRUS VAC: HCPCS | Mod: S$GLB,,, | Performed by: STUDENT IN AN ORGANIZED HEALTH CARE EDUCATION/TRAINING PROGRAM

## 2021-11-05 PROCEDURE — 90694 FLU VACCINE - QUADRIVALENT - ADJUVANTED: ICD-10-PCS | Mod: S$GLB,,, | Performed by: STUDENT IN AN ORGANIZED HEALTH CARE EDUCATION/TRAINING PROGRAM

## 2021-11-05 PROCEDURE — 3078F DIAST BP <80 MM HG: CPT | Mod: CPTII,S$GLB,, | Performed by: STUDENT IN AN ORGANIZED HEALTH CARE EDUCATION/TRAINING PROGRAM

## 2021-11-05 PROCEDURE — 3075F SYST BP GE 130 - 139MM HG: CPT | Mod: CPTII,S$GLB,, | Performed by: STUDENT IN AN ORGANIZED HEALTH CARE EDUCATION/TRAINING PROGRAM

## 2021-11-05 PROCEDURE — 1101F PR PT FALLS ASSESS DOC 0-1 FALLS W/OUT INJ PAST YR: ICD-10-PCS | Mod: CPTII,S$GLB,, | Performed by: STUDENT IN AN ORGANIZED HEALTH CARE EDUCATION/TRAINING PROGRAM

## 2021-11-05 PROCEDURE — 99999 PR PBB SHADOW E&M-EST. PATIENT-LVL IV: ICD-10-PCS | Mod: PBBFAC,,, | Performed by: STUDENT IN AN ORGANIZED HEALTH CARE EDUCATION/TRAINING PROGRAM

## 2021-11-05 PROCEDURE — 3078F PR MOST RECENT DIASTOLIC BLOOD PRESSURE < 80 MM HG: ICD-10-PCS | Mod: CPTII,S$GLB,, | Performed by: STUDENT IN AN ORGANIZED HEALTH CARE EDUCATION/TRAINING PROGRAM

## 2021-11-05 PROCEDURE — 3288F FALL RISK ASSESSMENT DOCD: CPT | Mod: CPTII,S$GLB,, | Performed by: STUDENT IN AN ORGANIZED HEALTH CARE EDUCATION/TRAINING PROGRAM

## 2021-11-05 PROCEDURE — 99397 PR PREVENTIVE VISIT,EST,65 & OVER: ICD-10-PCS | Mod: S$GLB,,, | Performed by: STUDENT IN AN ORGANIZED HEALTH CARE EDUCATION/TRAINING PROGRAM

## 2021-11-05 PROCEDURE — 1126F AMNT PAIN NOTED NONE PRSNT: CPT | Mod: CPTII,S$GLB,, | Performed by: STUDENT IN AN ORGANIZED HEALTH CARE EDUCATION/TRAINING PROGRAM

## 2021-11-05 PROCEDURE — 1159F MED LIST DOCD IN RCRD: CPT | Mod: CPTII,S$GLB,, | Performed by: STUDENT IN AN ORGANIZED HEALTH CARE EDUCATION/TRAINING PROGRAM

## 2021-11-05 PROCEDURE — 3288F PR FALLS RISK ASSESSMENT DOCUMENTED: ICD-10-PCS | Mod: CPTII,S$GLB,, | Performed by: STUDENT IN AN ORGANIZED HEALTH CARE EDUCATION/TRAINING PROGRAM

## 2021-11-05 PROCEDURE — 99999 PR PBB SHADOW E&M-EST. PATIENT-LVL IV: CPT | Mod: PBBFAC,,, | Performed by: STUDENT IN AN ORGANIZED HEALTH CARE EDUCATION/TRAINING PROGRAM

## 2021-11-05 PROCEDURE — G0008 FLU VACCINE - QUADRIVALENT - ADJUVANTED: ICD-10-PCS | Mod: S$GLB,,, | Performed by: STUDENT IN AN ORGANIZED HEALTH CARE EDUCATION/TRAINING PROGRAM

## 2021-11-05 PROCEDURE — 1101F PT FALLS ASSESS-DOCD LE1/YR: CPT | Mod: CPTII,S$GLB,, | Performed by: STUDENT IN AN ORGANIZED HEALTH CARE EDUCATION/TRAINING PROGRAM

## 2021-11-05 PROCEDURE — 1159F PR MEDICATION LIST DOCUMENTED IN MEDICAL RECORD: ICD-10-PCS | Mod: CPTII,S$GLB,, | Performed by: STUDENT IN AN ORGANIZED HEALTH CARE EDUCATION/TRAINING PROGRAM

## 2021-11-05 PROCEDURE — 1126F PR PAIN SEVERITY QUANTIFIED, NO PAIN PRESENT: ICD-10-PCS | Mod: CPTII,S$GLB,, | Performed by: STUDENT IN AN ORGANIZED HEALTH CARE EDUCATION/TRAINING PROGRAM

## 2021-11-05 RX ORDER — CELECOXIB 200 MG/1
200 CAPSULE ORAL 2 TIMES DAILY PRN
Qty: 28 CAPSULE | Refills: 0 | Status: SHIPPED | OUTPATIENT
Start: 2021-11-05 | End: 2021-11-19

## 2021-11-05 RX ORDER — HYDROCODONE BITARTRATE AND ACETAMINOPHEN 7.5; 325 MG/1; MG/1
TABLET ORAL
Qty: 90 TABLET | Refills: 0 | Status: SHIPPED | OUTPATIENT
Start: 2021-11-05 | End: 2022-05-05 | Stop reason: SDUPTHER

## 2021-11-08 ENCOUNTER — IMMUNIZATION (OUTPATIENT)
Dept: PRIMARY CARE CLINIC | Facility: CLINIC | Age: 81
End: 2021-11-08
Payer: MEDICARE

## 2021-11-08 ENCOUNTER — LAB VISIT (OUTPATIENT)
Dept: LAB | Facility: HOSPITAL | Age: 81
End: 2021-11-08
Attending: STUDENT IN AN ORGANIZED HEALTH CARE EDUCATION/TRAINING PROGRAM
Payer: MEDICARE

## 2021-11-08 DIAGNOSIS — Z00.00 VISIT FOR PREVENTIVE HEALTH EXAMINATION: ICD-10-CM

## 2021-11-08 DIAGNOSIS — Z23 NEED FOR VACCINATION: Primary | ICD-10-CM

## 2021-11-08 DIAGNOSIS — R79.9 ABNORMAL FINDING OF BLOOD CHEMISTRY, UNSPECIFIED: ICD-10-CM

## 2021-11-08 DIAGNOSIS — I10 ESSENTIAL HYPERTENSION: ICD-10-CM

## 2021-11-08 DIAGNOSIS — E78.5 HYPERLIPIDEMIA, UNSPECIFIED HYPERLIPIDEMIA TYPE: ICD-10-CM

## 2021-11-08 LAB
ALBUMIN SERPL BCP-MCNC: 3.8 G/DL (ref 3.5–5.2)
ALP SERPL-CCNC: 47 U/L (ref 55–135)
ALT SERPL W/O P-5'-P-CCNC: 19 U/L (ref 10–44)
ANION GAP SERPL CALC-SCNC: 12 MMOL/L (ref 8–16)
AST SERPL-CCNC: 23 U/L (ref 10–40)
BASOPHILS # BLD AUTO: 0.02 K/UL (ref 0–0.2)
BASOPHILS NFR BLD: 0.4 % (ref 0–1.9)
BILIRUB SERPL-MCNC: 0.8 MG/DL (ref 0.1–1)
BUN SERPL-MCNC: 12 MG/DL (ref 8–23)
CALCIUM SERPL-MCNC: 9.3 MG/DL (ref 8.7–10.5)
CHLORIDE SERPL-SCNC: 105 MMOL/L (ref 95–110)
CHOLEST SERPL-MCNC: 150 MG/DL (ref 120–199)
CHOLEST/HDLC SERPL: 2.5 {RATIO} (ref 2–5)
CO2 SERPL-SCNC: 25 MMOL/L (ref 23–29)
CREAT SERPL-MCNC: 0.8 MG/DL (ref 0.5–1.4)
DIFFERENTIAL METHOD: ABNORMAL
EOSINOPHIL # BLD AUTO: 0.2 K/UL (ref 0–0.5)
EOSINOPHIL NFR BLD: 4 % (ref 0–8)
ERYTHROCYTE [DISTWIDTH] IN BLOOD BY AUTOMATED COUNT: 13.1 % (ref 11.5–14.5)
EST. GFR  (AFRICAN AMERICAN): >60 ML/MIN/1.73 M^2
EST. GFR  (NON AFRICAN AMERICAN): >60 ML/MIN/1.73 M^2
GLUCOSE SERPL-MCNC: 101 MG/DL (ref 70–110)
HCT VFR BLD AUTO: 43.9 % (ref 40–54)
HDLC SERPL-MCNC: 59 MG/DL (ref 40–75)
HDLC SERPL: 39.3 % (ref 20–50)
HGB BLD-MCNC: 14.5 G/DL (ref 14–18)
IMM GRANULOCYTES # BLD AUTO: 0.03 K/UL (ref 0–0.04)
IMM GRANULOCYTES NFR BLD AUTO: 0.6 % (ref 0–0.5)
LDLC SERPL CALC-MCNC: 76.2 MG/DL (ref 63–159)
LYMPHOCYTES # BLD AUTO: 1.2 K/UL (ref 1–4.8)
LYMPHOCYTES NFR BLD: 22.4 % (ref 18–48)
MCH RBC QN AUTO: 30.7 PG (ref 27–31)
MCHC RBC AUTO-ENTMCNC: 33 G/DL (ref 32–36)
MCV RBC AUTO: 93 FL (ref 82–98)
MONOCYTES # BLD AUTO: 0.6 K/UL (ref 0.3–1)
MONOCYTES NFR BLD: 11.3 % (ref 4–15)
NEUTROPHILS # BLD AUTO: 3.3 K/UL (ref 1.8–7.7)
NEUTROPHILS NFR BLD: 61.3 % (ref 38–73)
NONHDLC SERPL-MCNC: 91 MG/DL
NRBC BLD-RTO: 0 /100 WBC
PLATELET # BLD AUTO: 205 K/UL (ref 150–450)
PMV BLD AUTO: 8.5 FL (ref 9.2–12.9)
POTASSIUM SERPL-SCNC: 4.1 MMOL/L (ref 3.5–5.1)
PROT SERPL-MCNC: 7.2 G/DL (ref 6–8.4)
RBC # BLD AUTO: 4.72 M/UL (ref 4.6–6.2)
SODIUM SERPL-SCNC: 142 MMOL/L (ref 136–145)
TRIGL SERPL-MCNC: 74 MG/DL (ref 30–150)
WBC # BLD AUTO: 5.31 K/UL (ref 3.9–12.7)

## 2021-11-08 PROCEDURE — 85025 COMPLETE CBC W/AUTO DIFF WBC: CPT | Performed by: STUDENT IN AN ORGANIZED HEALTH CARE EDUCATION/TRAINING PROGRAM

## 2021-11-08 PROCEDURE — 0004A COVID-19, MRNA, LNP-S, PF, 30 MCG/0.3 ML DOSE VACCINE: ICD-10-PCS | Mod: CV19,S$GLB,, | Performed by: FAMILY MEDICINE

## 2021-11-08 PROCEDURE — 91300 COVID-19, MRNA, LNP-S, PF, 30 MCG/0.3 ML DOSE VACCINE: CPT | Mod: S$GLB,,, | Performed by: FAMILY MEDICINE

## 2021-11-08 PROCEDURE — 0004A COVID-19, MRNA, LNP-S, PF, 30 MCG/0.3 ML DOSE VACCINE: CPT | Mod: CV19,S$GLB,, | Performed by: FAMILY MEDICINE

## 2021-11-08 PROCEDURE — 80053 COMPREHEN METABOLIC PANEL: CPT | Performed by: STUDENT IN AN ORGANIZED HEALTH CARE EDUCATION/TRAINING PROGRAM

## 2021-11-08 PROCEDURE — 91300 COVID-19, MRNA, LNP-S, PF, 30 MCG/0.3 ML DOSE VACCINE: ICD-10-PCS | Mod: S$GLB,,, | Performed by: FAMILY MEDICINE

## 2021-11-08 PROCEDURE — 36415 COLL VENOUS BLD VENIPUNCTURE: CPT | Performed by: STUDENT IN AN ORGANIZED HEALTH CARE EDUCATION/TRAINING PROGRAM

## 2021-11-08 PROCEDURE — 80061 LIPID PANEL: CPT | Performed by: STUDENT IN AN ORGANIZED HEALTH CARE EDUCATION/TRAINING PROGRAM

## 2021-11-22 ENCOUNTER — PATIENT MESSAGE (OUTPATIENT)
Dept: FAMILY MEDICINE | Facility: CLINIC | Age: 81
End: 2021-11-22
Payer: MEDICARE

## 2021-11-22 ENCOUNTER — OFFICE VISIT (OUTPATIENT)
Dept: FAMILY MEDICINE | Facility: CLINIC | Age: 81
End: 2021-11-22
Payer: MEDICARE

## 2021-11-22 VITALS
WEIGHT: 163.13 LBS | TEMPERATURE: 98 F | DIASTOLIC BLOOD PRESSURE: 84 MMHG | BODY MASS INDEX: 28.9 KG/M2 | RESPIRATION RATE: 16 BRPM | HEART RATE: 88 BPM | SYSTOLIC BLOOD PRESSURE: 132 MMHG | OXYGEN SATURATION: 96 % | HEIGHT: 63 IN

## 2021-11-22 DIAGNOSIS — E78.2 MIXED HYPERLIPIDEMIA: ICD-10-CM

## 2021-11-22 DIAGNOSIS — I10 ESSENTIAL HYPERTENSION: ICD-10-CM

## 2021-11-22 DIAGNOSIS — M17.12 PRIMARY OSTEOARTHRITIS OF LEFT KNEE: Primary | ICD-10-CM

## 2021-11-22 DIAGNOSIS — Z00.00 HEALTHCARE MAINTENANCE: ICD-10-CM

## 2021-11-22 DIAGNOSIS — G47.00 INSOMNIA, UNSPECIFIED TYPE: ICD-10-CM

## 2021-11-22 PROCEDURE — 20610 PR DRAIN/INJECT LARGE JOINT/BURSA: ICD-10-PCS | Mod: LT,S$GLB,, | Performed by: STUDENT IN AN ORGANIZED HEALTH CARE EDUCATION/TRAINING PROGRAM

## 2021-11-22 PROCEDURE — 99214 PR OFFICE/OUTPT VISIT, EST, LEVL IV, 30-39 MIN: ICD-10-PCS | Mod: 25,S$GLB,, | Performed by: STUDENT IN AN ORGANIZED HEALTH CARE EDUCATION/TRAINING PROGRAM

## 2021-11-22 PROCEDURE — 20610 DRAIN/INJ JOINT/BURSA W/O US: CPT | Mod: LT,S$GLB,, | Performed by: STUDENT IN AN ORGANIZED HEALTH CARE EDUCATION/TRAINING PROGRAM

## 2021-11-22 PROCEDURE — 99999 PR PBB SHADOW E&M-EST. PATIENT-LVL IV: CPT | Mod: PBBFAC,,, | Performed by: STUDENT IN AN ORGANIZED HEALTH CARE EDUCATION/TRAINING PROGRAM

## 2021-11-22 PROCEDURE — 99999 PR PBB SHADOW E&M-EST. PATIENT-LVL IV: ICD-10-PCS | Mod: PBBFAC,,, | Performed by: STUDENT IN AN ORGANIZED HEALTH CARE EDUCATION/TRAINING PROGRAM

## 2021-11-22 PROCEDURE — 99214 OFFICE O/P EST MOD 30 MIN: CPT | Mod: 25,S$GLB,, | Performed by: STUDENT IN AN ORGANIZED HEALTH CARE EDUCATION/TRAINING PROGRAM

## 2021-11-22 RX ORDER — LIDOCAINE HYDROCHLORIDE 10 MG/ML
4 INJECTION, SOLUTION EPIDURAL; INFILTRATION; INTRACAUDAL; PERINEURAL
Status: COMPLETED | OUTPATIENT
Start: 2021-11-22 | End: 2021-11-22

## 2021-11-22 RX ORDER — TRIAMCINOLONE ACETONIDE 40 MG/ML
40 INJECTION, SUSPENSION INTRA-ARTICULAR; INTRAMUSCULAR
Status: COMPLETED | OUTPATIENT
Start: 2021-11-22 | End: 2021-11-22

## 2021-11-22 RX ADMIN — LIDOCAINE HYDROCHLORIDE 40 MG: 10 INJECTION, SOLUTION EPIDURAL; INFILTRATION; INTRACAUDAL; PERINEURAL at 09:11

## 2021-11-22 RX ADMIN — TRIAMCINOLONE ACETONIDE 40 MG: 40 INJECTION, SUSPENSION INTRA-ARTICULAR; INTRAMUSCULAR at 09:11

## 2022-01-19 DIAGNOSIS — G47.00 INSOMNIA, UNSPECIFIED TYPE: ICD-10-CM

## 2022-01-19 DIAGNOSIS — E78.5 HYPERLIPIDEMIA, UNSPECIFIED HYPERLIPIDEMIA TYPE: ICD-10-CM

## 2022-01-19 RX ORDER — TRAZODONE HYDROCHLORIDE 50 MG/1
TABLET ORAL
Qty: 90 TABLET | Refills: 0 | Status: SHIPPED | OUTPATIENT
Start: 2022-01-19 | End: 2022-04-22

## 2022-01-19 NOTE — TELEPHONE ENCOUNTER
No new care gaps identified.  Powered by [employer]. Reference number: [id number].   1/19/2022 12:10:29 AM CST

## 2022-01-19 NOTE — TELEPHONE ENCOUNTER
No new care gaps identified.  Powered by [employer]. Reference number: [id number].   1/19/2022 12:12:27 AM CST

## 2022-01-24 RX ORDER — SIMVASTATIN 20 MG/1
20 TABLET, FILM COATED ORAL NIGHTLY
Qty: 90 TABLET | Refills: 3 | Status: SHIPPED | OUTPATIENT
Start: 2022-01-24 | End: 2022-12-30

## 2022-01-24 NOTE — TELEPHONE ENCOUNTER
Refill Routing Note   Medication(s) are not appropriate for processing by [facility] for the following reason(s):      - Medication not previously prescribed by PCP    [facility] action(s):  Defer          --->Care Gap information included in message below if applicable.   Medication reconciliation completed: No   Automatic Epic Generated Protocol Data:        Requested Prescriptions   Pending Prescriptions Disp Refills    simvastatin (ZOCOR) 20 MG tablet [Pharmacy Med Name: SIMVASTATIN 20 MG TABLET] 90 tablet 3     Sig: Take 1 tablet (20 mg total) by mouth every evening.       Cardiovascular:  Antilipid - Statins Passed - 1/24/2022  1:27 PM        Passed - Patient is at least 18 years old        Passed - Valid encounter within last 15 months     Recent Visits  Date Type Provider Dept   11/22/21 Office Visit Bart Burrows MD [facility]   11/05/21 Office Visit Bart Burrows MD [facility]   05/07/21 Office Visit Bart Burrows MD [facility]   07/31/20 Office Visit Valeria Zimmerman MD [facility]   Showing recent visits within past 720 days and meeting all other requirements  Future Appointments  No visits were found meeting these conditions.  Showing future appointments within next 150 days and meeting all other requirements      Future Appointments              In 2 months Emy Pompa MD [place] [facility] [place]    In 3 months Bart Burrows MD [place] [facility] [place]                Passed - ALT is 131 or below and within 360 days     ALT   Date Value Ref Range Status   11/08/2021 19 10 - 44 U/L Final   05/07/2021 14 10 - 44 U/L Final   07/31/2020 14 10 - 44 U/L Final              Passed - AST is 119 or below and within 360 days     AST   Date Value Ref Range Status   11/08/2021 23 10 - 40 U/L Final   05/07/2021 21 10 - 40 U/L Final   07/31/2020 22 10 - 40 U/L Final              Passed - Total Cholesterol within 360 days      Lab Results   Component Value Date    CHOL 150 11/08/2021    CHOL 152 05/07/2021    CHOL 145 07/31/2020              Passed - LDL within 360 days     LDL Cholesterol   Date Value Ref Range Status   11/08/2021 76.2 63.0 - 159.0 mg/dL Final     Comment:     The National Cholesterol Education Program (NCEP) has set the  following guidelines (reference values) for LDL Cholesterol:  Optimal.......................<130 mg/dL  Borderline High...............130-159 mg/dL  High..........................160-189 mg/dL  Very High.....................>190 mg/dL              Passed - HDL within 360 days     HDL   Date Value Ref Range Status   11/08/2021 59 40 - 75 mg/dL Final     Comment:     The National Cholesterol Education Program (NCEP) has set the  following guidelines (reference values) for HDL Cholesterol:  Low...............<40 mg/dL  Optimal...........>60 mg/dL              Passed - Triglycerides within 360 days     Lab Results   Component Value Date    TRIG 74 11/08/2021    TRIG 96 05/07/2021    TRIG 117 07/31/2020                    Appointments  past 12m or future 3m with PCP    Date Provider   Last Visit   11/22/2021 Bart Burrows MD   Next Visit   1/19/2022 Bart Burrows MD   ED visits in past 90 days: 0        Note composed:1:30 PM 01/24/2022

## 2022-04-14 NOTE — PROGRESS NOTES
"[facility] Note    PCP: Bart Burrows MD    Chief Complaint: BPH    SUBJECTIVE:       History of Present Illness:  Tha Guzman is a 82 y.o. male who presents to clinic for hematospermia. He is Established  to our clinic.     Patient was previously started on Finasteride. Has not noticed a big difference on this.   Urinating every 1.5-2 hours. He does have some urgency and mild urge incontinence.   Weak stream and straining to urinating.     No hematuria or dysuria.   Twice he has taken cipro twice for 5 days due to bothersome prostatitis symptoms.     PVR: 64 cc    10/14/21  Previous patient of Dr. Hawkins, last seen Jan 2021.  Hx of BPH for which he is on Uroxatral.   Hx of microscopic hematuria s/p negative workup in 2016.    Was seen by Gabriella on 9/16 and was prescribed bactrim x 21 days for prostatitis. He takes that this was a "copper/brown discharge" in his underwear. This was not associated with urination.   At that time he was having some discomfort with sitting and bowel movements.   Has regular daily bowel movements.     No hematuria. Some discomfort with urination. No dysuria.   Has some urinary frequency q 2 hours. Stream is weak. Sits to urinate. Has some urgency and urge incontinence.     UA today: +trace blood, leuk and nitrite negative   PVR today: 74 cc    Last urine culture: no documented UTIs  PSA 1/14/21: 4.1    Lab Results   Component Value Date    CREATININE 0.8 11/08/2021     Family  hx: brother and uncle had prostate cancer   Hx of HLD, HTN, IBS    Past medical, family, and social history reviewed as documented in chart with pertinent positive medical, family, and social history detailed in HPI.    Review of patient's allergies indicates:   Allergen Reactions    Aminoglycosides Other (See Comments)     Lips swelled    Meperidine Itching    Penicillins Itching       Past Medical History:   Diagnosis Date    Arthritis     Elevated PSA     Essential " "hypertension, benign     Mixed hyperlipidemia     Prostatitis, unspecified     Wears glasses      Past Surgical History:   Procedure Laterality Date    cataracts      COLONOSCOPY N/A 2019    Procedure: COLONOSCOPY;  Surgeon: Dominick Sr MD;  [facility]: [facility];  Service: Endoscopy;  Laterality: N/A;    HEMORRHOID SURGERY      KNEE ARTHROSCOPY W/ MENISCAL REPAIR Left     TONSILLECTOMY       Family History   Problem Relation Age of Onset    Cancer Brother         lunga cancer    Cancer Paternal Uncle         prostate cancer    Melanoma Neg Hx     Psoriasis Neg Hx     Lupus Neg Hx     Eczema Neg Hx      Social History     Tobacco Use    Smoking status: Former Smoker     Packs/day: 1.00     Years: 40.00     Pack years: 40.00     Types: Cigarettes     Quit date: 2007     Years since quittin.7    Smokeless tobacco: Never Used   Substance Use Topics    Alcohol use: Yes     Alcohol/week: 5.0 standard drinks     Types: 6 Standard drinks or equivalent per week     Comment: OCCASSIONALLY    Drug use: No        Review of Systems   Genitourinary: Positive for difficulty urinating, frequency, nocturia and urgency. Negative for dysuria, flank pain, hematuria, penile swelling, scrotal swelling and testicular pain.       OBJECTIVE:     Anticoagulation:  Aspirin 81 mg     Estimated body mass index is 28.87 kg/m² as calculated from the following:    Height as of this encounter: 5' 3" (1.6 m).    Weight as of this encounter: 73.9 kg (163 lb).    Vital Signs (Most Recent)  Pulse: 86 (22 1523)  Resp: 18 (22 1523)  BP: (!) 141/96 (22 1523)    Physical Exam  Constitutional:       General: He is not in acute distress.     Appearance: Normal appearance. He is not ill-appearing.   HENT:      Head: Normocephalic and atraumatic.   Eyes:      General: No scleral icterus.  Cardiovascular:      Rate and Rhythm: Normal rate and regular rhythm.   Pulmonary:      Effort: Pulmonary effort is " normal. No respiratory distress.   Abdominal:      General: There is no distension.   Skin:     Coloration: Skin is not jaundiced.   Neurological:      General: No focal deficit present.      Mental Status: He is alert and oriented to person, place, and time.   Psychiatric:         Mood and Affect: Mood normal.         Behavior: Behavior normal.         Thought Content: Thought content normal.         BMP  Lab Results   Component Value Date     11/08/2021    K 4.1 11/08/2021     11/08/2021    CO2 25 11/08/2021    BUN 12 11/08/2021    CREATININE 0.8 11/08/2021    CALCIUM 9.3 11/08/2021    ANIONGAP 12 11/08/2021    ESTGFRAFRICA >60 11/08/2021    EGFRNONAA >60 11/08/2021       Lab Results   Component Value Date    WBC 5.31 11/08/2021    HGB 14.5 11/08/2021    HCT 43.9 11/08/2021    MCV 93 11/08/2021     11/08/2021       Lab Results   Component Value Date    PSA 3.69 06/11/2013    PSA 3.75 12/07/2012    PSA 4.91 (H) 09/26/2012    PSA 3.75 03/29/2012    PSA 4.51 (H) 03/07/2012    PSA 2.8 12/20/2010    PSA 3.2 09/13/2010    PSA 1.8 03/03/2010    PSADIAG 4.1 (H) 01/14/2021    PSADIAG 3.7 01/06/2020    PSADIAG 5.9 (H) 10/25/2019    PSADIAG 3.7 12/03/2018    PSADIAG 3.9 11/02/2017    PSADIAG 4.0 04/26/2017    PSADIAG 2.9 01/12/2016    PSADIAG 3.2 07/13/2015    PSADIAG 3.4 06/18/2014    PSADIAG 3.2 12/13/2013     Imaging:  US Retroperitoneal 3/23/16: Small bilateral renal cysts.  Prostate hypertrophy.    ASSESSMENT     1. Benign prostatic hyperplasia, unspecified whether lower urinary tract symptoms present    2. Elevated PSA    3. Hyperlipidemia, unspecified hyperlipidemia type    4. Primary hypertension    5. Irritable bowel syndrome, unspecified type        PLAN:     - Continues to have bothersome urinary symptoms, mainly urgency and frequency   - Offered trial of OAB medication vs cysto/TRUS for evaluate outlet   - He is currently not interested on potential surgery so would like to try medication   -  Vesicare 10 mg prescribed. Risks and benefits discussed.   - Will see him back in 3 months to assess his symptoms. If he is not improved we will plan to go ahead with cysto/TRUS   - I instructed him to not take old abx and to let us know if he is having bothersome urinary pain/issues so that we can have him come in for a urine sample     Emy Pompa MD

## 2022-04-18 ENCOUNTER — OFFICE VISIT (OUTPATIENT)
Dept: UROLOGY | Facility: CLINIC | Age: 82
End: 2022-04-18
Payer: MEDICARE

## 2022-04-18 VITALS
HEIGHT: 63 IN | DIASTOLIC BLOOD PRESSURE: 96 MMHG | HEART RATE: 86 BPM | RESPIRATION RATE: 18 BRPM | SYSTOLIC BLOOD PRESSURE: 141 MMHG | WEIGHT: 163 LBS | BODY MASS INDEX: 28.88 KG/M2

## 2022-04-18 DIAGNOSIS — R97.20 ELEVATED PSA: ICD-10-CM

## 2022-04-18 DIAGNOSIS — E78.5 HYPERLIPIDEMIA, UNSPECIFIED HYPERLIPIDEMIA TYPE: ICD-10-CM

## 2022-04-18 DIAGNOSIS — K58.9 IRRITABLE BOWEL SYNDROME, UNSPECIFIED TYPE: ICD-10-CM

## 2022-04-18 DIAGNOSIS — I10 PRIMARY HYPERTENSION: ICD-10-CM

## 2022-04-18 DIAGNOSIS — N40.0 BENIGN PROSTATIC HYPERPLASIA, UNSPECIFIED WHETHER LOWER URINARY TRACT SYMPTOMS PRESENT: Primary | ICD-10-CM

## 2022-04-18 PROCEDURE — 1159F MED LIST DOCD IN RCRD: CPT | Mod: CPTII,S$GLB,, | Performed by: STUDENT IN AN ORGANIZED HEALTH CARE EDUCATION/TRAINING PROGRAM

## 2022-04-18 PROCEDURE — 3077F SYST BP >= 140 MM HG: CPT | Mod: CPTII,S$GLB,, | Performed by: STUDENT IN AN ORGANIZED HEALTH CARE EDUCATION/TRAINING PROGRAM

## 2022-04-18 PROCEDURE — 1126F AMNT PAIN NOTED NONE PRSNT: CPT | Mod: CPTII,S$GLB,, | Performed by: STUDENT IN AN ORGANIZED HEALTH CARE EDUCATION/TRAINING PROGRAM

## 2022-04-18 PROCEDURE — 3080F PR MOST RECENT DIASTOLIC BLOOD PRESSURE >= 90 MM HG: ICD-10-PCS | Mod: CPTII,S$GLB,, | Performed by: STUDENT IN AN ORGANIZED HEALTH CARE EDUCATION/TRAINING PROGRAM

## 2022-04-18 PROCEDURE — 99214 OFFICE O/P EST MOD 30 MIN: CPT | Mod: S$GLB,,, | Performed by: STUDENT IN AN ORGANIZED HEALTH CARE EDUCATION/TRAINING PROGRAM

## 2022-04-18 PROCEDURE — 1101F PT FALLS ASSESS-DOCD LE1/YR: CPT | Mod: CPTII,S$GLB,, | Performed by: STUDENT IN AN ORGANIZED HEALTH CARE EDUCATION/TRAINING PROGRAM

## 2022-04-18 PROCEDURE — 99999 PR PBB SHADOW E&M-EST. PATIENT-LVL III: ICD-10-PCS | Mod: PBBFAC,,, | Performed by: STUDENT IN AN ORGANIZED HEALTH CARE EDUCATION/TRAINING PROGRAM

## 2022-04-18 PROCEDURE — 3077F PR MOST RECENT SYSTOLIC BLOOD PRESSURE >= 140 MM HG: ICD-10-PCS | Mod: CPTII,S$GLB,, | Performed by: STUDENT IN AN ORGANIZED HEALTH CARE EDUCATION/TRAINING PROGRAM

## 2022-04-18 PROCEDURE — 99999 PR PBB SHADOW E&M-EST. PATIENT-LVL III: CPT | Mod: PBBFAC,,, | Performed by: STUDENT IN AN ORGANIZED HEALTH CARE EDUCATION/TRAINING PROGRAM

## 2022-04-18 PROCEDURE — 3080F DIAST BP >= 90 MM HG: CPT | Mod: CPTII,S$GLB,, | Performed by: STUDENT IN AN ORGANIZED HEALTH CARE EDUCATION/TRAINING PROGRAM

## 2022-04-18 PROCEDURE — 1126F PR PAIN SEVERITY QUANTIFIED, NO PAIN PRESENT: ICD-10-PCS | Mod: CPTII,S$GLB,, | Performed by: STUDENT IN AN ORGANIZED HEALTH CARE EDUCATION/TRAINING PROGRAM

## 2022-04-18 PROCEDURE — 1101F PR PT FALLS ASSESS DOC 0-1 FALLS W/OUT INJ PAST YR: ICD-10-PCS | Mod: CPTII,S$GLB,, | Performed by: STUDENT IN AN ORGANIZED HEALTH CARE EDUCATION/TRAINING PROGRAM

## 2022-04-18 PROCEDURE — 3288F PR FALLS RISK ASSESSMENT DOCUMENTED: ICD-10-PCS | Mod: CPTII,S$GLB,, | Performed by: STUDENT IN AN ORGANIZED HEALTH CARE EDUCATION/TRAINING PROGRAM

## 2022-04-18 PROCEDURE — 99214 PR OFFICE/OUTPT VISIT, EST, LEVL IV, 30-39 MIN: ICD-10-PCS | Mod: S$GLB,,, | Performed by: STUDENT IN AN ORGANIZED HEALTH CARE EDUCATION/TRAINING PROGRAM

## 2022-04-18 PROCEDURE — 3288F FALL RISK ASSESSMENT DOCD: CPT | Mod: CPTII,S$GLB,, | Performed by: STUDENT IN AN ORGANIZED HEALTH CARE EDUCATION/TRAINING PROGRAM

## 2022-04-18 PROCEDURE — 1159F PR MEDICATION LIST DOCUMENTED IN MEDICAL RECORD: ICD-10-PCS | Mod: CPTII,S$GLB,, | Performed by: STUDENT IN AN ORGANIZED HEALTH CARE EDUCATION/TRAINING PROGRAM

## 2022-04-18 RX ORDER — SOLIFENACIN SUCCINATE 10 MG/1
10 TABLET, FILM COATED ORAL DAILY
Qty: 30 TABLET | Refills: 11 | Status: SHIPPED | OUTPATIENT
Start: 2022-04-18 | End: 2022-11-08

## 2022-04-22 DIAGNOSIS — G47.00 INSOMNIA, UNSPECIFIED TYPE: ICD-10-CM

## 2022-04-22 RX ORDER — TRAZODONE HYDROCHLORIDE 50 MG/1
TABLET ORAL
Qty: 90 TABLET | Refills: 0 | Status: SHIPPED | OUTPATIENT
Start: 2022-04-22 | End: 2022-07-20

## 2022-04-22 NOTE — TELEPHONE ENCOUNTER
No new care gaps identified.  Powered by [employer]. Reference number: [id number].   4/22/2022 12:05:14 AM CDT

## 2022-04-22 NOTE — TELEPHONE ENCOUNTER
Refill Routing Note   Medication(s) are not appropriate for processing by [facility] for the following reason(s):      - Indication is outside of scope for [facility]    [facility] action(s):  Route          Medication reconciliation completed: No     Appointments  past 12m or future 3m with PCP    Date Provider   Last Visit   11/22/2021 Bart Burrows MD   Next Visit   5/5/2022 Bart Burrows MD   ED visits in past 90 days: 0        Note composed:8:32 AM 04/22/2022

## 2022-04-26 ENCOUNTER — PATIENT MESSAGE (OUTPATIENT)
Dept: FAMILY MEDICINE | Facility: CLINIC | Age: 82
End: 2022-04-26
Payer: MEDICARE

## 2022-04-28 ENCOUNTER — OFFICE VISIT (OUTPATIENT)
Dept: FAMILY MEDICINE | Facility: CLINIC | Age: 82
End: 2022-04-28
Payer: MEDICARE

## 2022-04-28 VITALS
OXYGEN SATURATION: 96 % | SYSTOLIC BLOOD PRESSURE: 136 MMHG | BODY MASS INDEX: 28.64 KG/M2 | HEART RATE: 91 BPM | DIASTOLIC BLOOD PRESSURE: 80 MMHG | HEIGHT: 63 IN | WEIGHT: 161.63 LBS

## 2022-04-28 DIAGNOSIS — H69.92 ETD (EUSTACHIAN TUBE DYSFUNCTION), LEFT: Primary | ICD-10-CM

## 2022-04-28 PROCEDURE — 3075F SYST BP GE 130 - 139MM HG: CPT | Mod: CPTII,S$GLB,, | Performed by: FAMILY MEDICINE

## 2022-04-28 PROCEDURE — 1160F PR REVIEW ALL MEDS BY PRESCRIBER/CLIN PHARMACIST DOCUMENTED: ICD-10-PCS | Mod: CPTII,S$GLB,, | Performed by: FAMILY MEDICINE

## 2022-04-28 PROCEDURE — 99999 PR PBB SHADOW E&M-EST. PATIENT-LVL III: ICD-10-PCS | Mod: PBBFAC,,, | Performed by: FAMILY MEDICINE

## 2022-04-28 PROCEDURE — 1101F PR PT FALLS ASSESS DOC 0-1 FALLS W/OUT INJ PAST YR: ICD-10-PCS | Mod: CPTII,S$GLB,, | Performed by: FAMILY MEDICINE

## 2022-04-28 PROCEDURE — 99213 PR OFFICE/OUTPT VISIT, EST, LEVL III, 20-29 MIN: ICD-10-PCS | Mod: S$GLB,,, | Performed by: FAMILY MEDICINE

## 2022-04-28 PROCEDURE — 1159F MED LIST DOCD IN RCRD: CPT | Mod: CPTII,S$GLB,, | Performed by: FAMILY MEDICINE

## 2022-04-28 PROCEDURE — 99999 PR PBB SHADOW E&M-EST. PATIENT-LVL III: CPT | Mod: PBBFAC,,, | Performed by: FAMILY MEDICINE

## 2022-04-28 PROCEDURE — 3288F PR FALLS RISK ASSESSMENT DOCUMENTED: ICD-10-PCS | Mod: CPTII,S$GLB,, | Performed by: FAMILY MEDICINE

## 2022-04-28 PROCEDURE — 3075F PR MOST RECENT SYSTOLIC BLOOD PRESS GE 130-139MM HG: ICD-10-PCS | Mod: CPTII,S$GLB,, | Performed by: FAMILY MEDICINE

## 2022-04-28 PROCEDURE — 3288F FALL RISK ASSESSMENT DOCD: CPT | Mod: CPTII,S$GLB,, | Performed by: FAMILY MEDICINE

## 2022-04-28 PROCEDURE — 1159F PR MEDICATION LIST DOCUMENTED IN MEDICAL RECORD: ICD-10-PCS | Mod: CPTII,S$GLB,, | Performed by: FAMILY MEDICINE

## 2022-04-28 PROCEDURE — 1101F PT FALLS ASSESS-DOCD LE1/YR: CPT | Mod: CPTII,S$GLB,, | Performed by: FAMILY MEDICINE

## 2022-04-28 PROCEDURE — 3079F DIAST BP 80-89 MM HG: CPT | Mod: CPTII,S$GLB,, | Performed by: FAMILY MEDICINE

## 2022-04-28 PROCEDURE — 3079F PR MOST RECENT DIASTOLIC BLOOD PRESSURE 80-89 MM HG: ICD-10-PCS | Mod: CPTII,S$GLB,, | Performed by: FAMILY MEDICINE

## 2022-04-28 PROCEDURE — 1160F RVW MEDS BY RX/DR IN RCRD: CPT | Mod: CPTII,S$GLB,, | Performed by: FAMILY MEDICINE

## 2022-04-28 PROCEDURE — 99213 OFFICE O/P EST LOW 20 MIN: CPT | Mod: S$GLB,,, | Performed by: FAMILY MEDICINE

## 2022-04-28 RX ORDER — PREDNISONE 20 MG/1
TABLET ORAL
Qty: 10 TABLET | Refills: 0 | Status: SHIPPED | OUTPATIENT
Start: 2022-04-28 | End: 2022-05-05

## 2022-04-28 NOTE — PROGRESS NOTES
Subjective:       Patient ID: Tha Guzman is a 82 y.o. male.    Chief Complaint: No chief complaint on file.    New to me patient here for UC visit.  Past wek or so, Left ear feels full/clogged and some pain between ear and throat.  Some nasal congestion and left maxillary area pressure.  No ear Dc or fever or ST.    In past Flonase use was associated with a severe nose bleed.    Review of Systems   Constitutional: Negative for fever.   Respiratory: Negative for shortness of breath.    Cardiovascular: Negative for chest pain.   Gastrointestinal: Negative for abdominal pain and nausea.   Skin: Negative for rash.   Neurological: Negative for numbness.   All other systems reviewed and are negative.      Objective:      Physical Exam  Constitutional:       General: He is not in acute distress.     Appearance: He is well-developed.   HENT:      Right Ear: Ear canal normal. Tympanic membrane is retracted.      Left Ear: Ear canal normal. Tympanic membrane is retracted.      Nose: Congestion present.      Left Sinus: Maxillary sinus tenderness (mild soreness) present.      Mouth/Throat:      Mouth: Mucous membranes are moist.      Pharynx: No oropharyngeal exudate or posterior oropharyngeal erythema.   Cardiovascular:      Rate and Rhythm: Normal rate and regular rhythm.      Heart sounds: No murmur heard.  Pulmonary:      Effort: Pulmonary effort is normal.      Breath sounds: Normal breath sounds.   Musculoskeletal:      Cervical back: Neck supple.   Lymphadenopathy:      Cervical: No cervical adenopathy.   Skin:     General: Skin is warm and dry.         Assessment:       1. ETD (Eustachian tube dysfunction), left        Plan:       ETD (Eustachian tube dysfunction), left    Other orders  -     predniSONE (DELTASONE) 20 MG tablet; One BID for 3 days then once a day orally  Dispense: 10 tablet; Refill: 0      Patient Instructions   Take both Benadryl 25 mg at bedtime and Claritin (loratadine) once a day in  AM.    Contact us if any sign of infection.    Pt has OV next week for follow-up with PCP as well.

## 2022-04-28 NOTE — PATIENT INSTRUCTIONS
Take both Benadryl 25 mg at bedtime and Claritin (loratadine) once a day in AM.    Contact us if any sign of infection.

## 2022-05-05 ENCOUNTER — LAB VISIT (OUTPATIENT)
Dept: LAB | Facility: HOSPITAL | Age: 82
End: 2022-05-05
Attending: STUDENT IN AN ORGANIZED HEALTH CARE EDUCATION/TRAINING PROGRAM
Payer: MEDICARE

## 2022-05-05 ENCOUNTER — OFFICE VISIT (OUTPATIENT)
Dept: FAMILY MEDICINE | Facility: CLINIC | Age: 82
End: 2022-05-05
Payer: MEDICARE

## 2022-05-05 VITALS
TEMPERATURE: 99 F | WEIGHT: 160.94 LBS | HEIGHT: 63 IN | SYSTOLIC BLOOD PRESSURE: 112 MMHG | DIASTOLIC BLOOD PRESSURE: 76 MMHG | HEART RATE: 72 BPM | RESPIRATION RATE: 16 BRPM | BODY MASS INDEX: 28.52 KG/M2 | OXYGEN SATURATION: 93 %

## 2022-05-05 DIAGNOSIS — M26.609 TMJ (TEMPOROMANDIBULAR JOINT SYNDROME): Primary | ICD-10-CM

## 2022-05-05 DIAGNOSIS — G47.00 INSOMNIA, UNSPECIFIED TYPE: ICD-10-CM

## 2022-05-05 DIAGNOSIS — I10 ESSENTIAL HYPERTENSION: ICD-10-CM

## 2022-05-05 DIAGNOSIS — M17.0 PRIMARY OSTEOARTHRITIS OF BOTH KNEES: ICD-10-CM

## 2022-05-05 DIAGNOSIS — E78.2 MIXED HYPERLIPIDEMIA: ICD-10-CM

## 2022-05-05 DIAGNOSIS — E78.5 HYPERLIPIDEMIA, UNSPECIFIED HYPERLIPIDEMIA TYPE: ICD-10-CM

## 2022-05-05 DIAGNOSIS — N40.0 BENIGN PROSTATIC HYPERPLASIA, UNSPECIFIED WHETHER LOWER URINARY TRACT SYMPTOMS PRESENT: ICD-10-CM

## 2022-05-05 LAB
ALBUMIN SERPL BCP-MCNC: 3.8 G/DL (ref 3.5–5.2)
ALP SERPL-CCNC: 42 U/L (ref 55–135)
ALT SERPL W/O P-5'-P-CCNC: 14 U/L (ref 10–44)
ANION GAP SERPL CALC-SCNC: 11 MMOL/L (ref 8–16)
AST SERPL-CCNC: 18 U/L (ref 10–40)
BASOPHILS # BLD AUTO: 0.06 K/UL (ref 0–0.2)
BASOPHILS NFR BLD: 0.8 % (ref 0–1.9)
BILIRUB SERPL-MCNC: 0.9 MG/DL (ref 0.1–1)
BUN SERPL-MCNC: 20 MG/DL (ref 8–23)
CALCIUM SERPL-MCNC: 9.3 MG/DL (ref 8.7–10.5)
CHLORIDE SERPL-SCNC: 104 MMOL/L (ref 95–110)
CO2 SERPL-SCNC: 24 MMOL/L (ref 23–29)
CREAT SERPL-MCNC: 0.8 MG/DL (ref 0.5–1.4)
DIFFERENTIAL METHOD: ABNORMAL
EOSINOPHIL # BLD AUTO: 0.1 K/UL (ref 0–0.5)
EOSINOPHIL NFR BLD: 1.8 % (ref 0–8)
ERYTHROCYTE [DISTWIDTH] IN BLOOD BY AUTOMATED COUNT: 12.7 % (ref 11.5–14.5)
EST. GFR  (AFRICAN AMERICAN): >60 ML/MIN/1.73 M^2
EST. GFR  (NON AFRICAN AMERICAN): >60 ML/MIN/1.73 M^2
ESTIMATED AVG GLUCOSE: 103 MG/DL (ref 68–131)
GLUCOSE SERPL-MCNC: 82 MG/DL (ref 70–110)
HBA1C MFR BLD: 5.2 % (ref 4–5.6)
HCT VFR BLD AUTO: 43.7 % (ref 40–54)
HGB BLD-MCNC: 14.4 G/DL (ref 14–18)
IMM GRANULOCYTES # BLD AUTO: 0.14 K/UL (ref 0–0.04)
IMM GRANULOCYTES NFR BLD AUTO: 1.8 % (ref 0–0.5)
LYMPHOCYTES # BLD AUTO: 1.5 K/UL (ref 1–4.8)
LYMPHOCYTES NFR BLD: 18.8 % (ref 18–48)
MCH RBC QN AUTO: 30 PG (ref 27–31)
MCHC RBC AUTO-ENTMCNC: 33 G/DL (ref 32–36)
MCV RBC AUTO: 91 FL (ref 82–98)
MONOCYTES # BLD AUTO: 0.7 K/UL (ref 0.3–1)
MONOCYTES NFR BLD: 8.8 % (ref 4–15)
NEUTROPHILS # BLD AUTO: 5.4 K/UL (ref 1.8–7.7)
NEUTROPHILS NFR BLD: 68 % (ref 38–73)
NRBC BLD-RTO: 0 /100 WBC
PLATELET # BLD AUTO: 235 K/UL (ref 150–450)
PMV BLD AUTO: 8.6 FL (ref 9.2–12.9)
POTASSIUM SERPL-SCNC: 3.8 MMOL/L (ref 3.5–5.1)
PROT SERPL-MCNC: 6.5 G/DL (ref 6–8.4)
RBC # BLD AUTO: 4.8 M/UL (ref 4.6–6.2)
SODIUM SERPL-SCNC: 139 MMOL/L (ref 136–145)
WBC # BLD AUTO: 7.93 K/UL (ref 3.9–12.7)

## 2022-05-05 PROCEDURE — 3288F FALL RISK ASSESSMENT DOCD: CPT | Mod: CPTII,S$GLB,, | Performed by: STUDENT IN AN ORGANIZED HEALTH CARE EDUCATION/TRAINING PROGRAM

## 2022-05-05 PROCEDURE — 83036 HEMOGLOBIN GLYCOSYLATED A1C: CPT | Performed by: STUDENT IN AN ORGANIZED HEALTH CARE EDUCATION/TRAINING PROGRAM

## 2022-05-05 PROCEDURE — 99999 PR PBB SHADOW E&M-EST. PATIENT-LVL V: ICD-10-PCS | Mod: PBBFAC,,, | Performed by: STUDENT IN AN ORGANIZED HEALTH CARE EDUCATION/TRAINING PROGRAM

## 2022-05-05 PROCEDURE — 99999 PR PBB SHADOW E&M-EST. PATIENT-LVL V: CPT | Mod: PBBFAC,,, | Performed by: STUDENT IN AN ORGANIZED HEALTH CARE EDUCATION/TRAINING PROGRAM

## 2022-05-05 PROCEDURE — 1101F PR PT FALLS ASSESS DOC 0-1 FALLS W/OUT INJ PAST YR: ICD-10-PCS | Mod: CPTII,S$GLB,, | Performed by: STUDENT IN AN ORGANIZED HEALTH CARE EDUCATION/TRAINING PROGRAM

## 2022-05-05 PROCEDURE — 1159F MED LIST DOCD IN RCRD: CPT | Mod: CPTII,S$GLB,, | Performed by: STUDENT IN AN ORGANIZED HEALTH CARE EDUCATION/TRAINING PROGRAM

## 2022-05-05 PROCEDURE — 3078F DIAST BP <80 MM HG: CPT | Mod: CPTII,S$GLB,, | Performed by: STUDENT IN AN ORGANIZED HEALTH CARE EDUCATION/TRAINING PROGRAM

## 2022-05-05 PROCEDURE — 3078F PR MOST RECENT DIASTOLIC BLOOD PRESSURE < 80 MM HG: ICD-10-PCS | Mod: CPTII,S$GLB,, | Performed by: STUDENT IN AN ORGANIZED HEALTH CARE EDUCATION/TRAINING PROGRAM

## 2022-05-05 PROCEDURE — 99214 PR OFFICE/OUTPT VISIT, EST, LEVL IV, 30-39 MIN: ICD-10-PCS | Mod: S$GLB,,, | Performed by: STUDENT IN AN ORGANIZED HEALTH CARE EDUCATION/TRAINING PROGRAM

## 2022-05-05 PROCEDURE — 36415 COLL VENOUS BLD VENIPUNCTURE: CPT | Mod: PO | Performed by: STUDENT IN AN ORGANIZED HEALTH CARE EDUCATION/TRAINING PROGRAM

## 2022-05-05 PROCEDURE — 1125F AMNT PAIN NOTED PAIN PRSNT: CPT | Mod: CPTII,S$GLB,, | Performed by: STUDENT IN AN ORGANIZED HEALTH CARE EDUCATION/TRAINING PROGRAM

## 2022-05-05 PROCEDURE — 1101F PT FALLS ASSESS-DOCD LE1/YR: CPT | Mod: CPTII,S$GLB,, | Performed by: STUDENT IN AN ORGANIZED HEALTH CARE EDUCATION/TRAINING PROGRAM

## 2022-05-05 PROCEDURE — 85025 COMPLETE CBC W/AUTO DIFF WBC: CPT | Performed by: STUDENT IN AN ORGANIZED HEALTH CARE EDUCATION/TRAINING PROGRAM

## 2022-05-05 PROCEDURE — 80053 COMPREHEN METABOLIC PANEL: CPT | Performed by: STUDENT IN AN ORGANIZED HEALTH CARE EDUCATION/TRAINING PROGRAM

## 2022-05-05 PROCEDURE — 1125F PR PAIN SEVERITY QUANTIFIED, PAIN PRESENT: ICD-10-PCS | Mod: CPTII,S$GLB,, | Performed by: STUDENT IN AN ORGANIZED HEALTH CARE EDUCATION/TRAINING PROGRAM

## 2022-05-05 PROCEDURE — 99214 OFFICE O/P EST MOD 30 MIN: CPT | Mod: S$GLB,,, | Performed by: STUDENT IN AN ORGANIZED HEALTH CARE EDUCATION/TRAINING PROGRAM

## 2022-05-05 PROCEDURE — 1159F PR MEDICATION LIST DOCUMENTED IN MEDICAL RECORD: ICD-10-PCS | Mod: CPTII,S$GLB,, | Performed by: STUDENT IN AN ORGANIZED HEALTH CARE EDUCATION/TRAINING PROGRAM

## 2022-05-05 PROCEDURE — 3288F PR FALLS RISK ASSESSMENT DOCUMENTED: ICD-10-PCS | Mod: CPTII,S$GLB,, | Performed by: STUDENT IN AN ORGANIZED HEALTH CARE EDUCATION/TRAINING PROGRAM

## 2022-05-05 PROCEDURE — 3074F PR MOST RECENT SYSTOLIC BLOOD PRESSURE < 130 MM HG: ICD-10-PCS | Mod: CPTII,S$GLB,, | Performed by: STUDENT IN AN ORGANIZED HEALTH CARE EDUCATION/TRAINING PROGRAM

## 2022-05-05 PROCEDURE — 3074F SYST BP LT 130 MM HG: CPT | Mod: CPTII,S$GLB,, | Performed by: STUDENT IN AN ORGANIZED HEALTH CARE EDUCATION/TRAINING PROGRAM

## 2022-05-05 RX ORDER — HYDROCODONE BITARTRATE AND ACETAMINOPHEN 7.5; 325 MG/1; MG/1
TABLET ORAL
Qty: 90 TABLET | Refills: 0 | Status: SHIPPED | OUTPATIENT
Start: 2022-05-05 | End: 2022-11-08 | Stop reason: SDUPTHER

## 2022-05-05 NOTE — PATIENT INSTRUCTIONS
Jeremi Almazan, Please read below to learn more on healthy choices, screenings, and useful information related to your health.  Most of my patients read it. Most of my healthy patients complete their cancer screenings. Don't lose out on improving your health.     Table of Contents:     Overdue health maintenance   Cancer prevention  Explanation on the different components of your blood work and interpretation  Frequently asked questions   Blood pressure log     If you are due for any health screening(s) below please notify me so we can arrange them to be ordered and scheduled to maintain your health. Most healthy patients at your age complete them.   Health Maintenance Due   Topic    Shingles Vaccine (1 of 2)    COVID-19 Vaccine (4 - Booster for Pfizer series)                                Cancer Prevention    Why should you choose to get screened for cancer? One simple reason is because you are important. You matter and deserve to have the best health so you can fulfill your great potential.       Colon Cancer screening - Most colon cancers can be prevented by screening. In most cases a polyp in the colon can grow and is not cancerous at first, but it can become cancerous years later. A polyp is like a seed that can grow into a weed if it is left in the soil. If the seed is detected and removed, no weed sprouts. A FIT test/Cologuard test and colonoscopy can detect precancerous polyps and lead to the prevention of cancer. A polyp is just like a seed that can be removed before the roots take hold. A colonoscopy can remove these polyps and eliminate the chance that these polyps turn into cancer.     Cervical Cancer screening- A PAP smear can detect precancerous cells that can become cervical cancer and can lead to procedures to remove them. It is very important to get a PAP smear as investing these few minutes can help prevent a lot of trouble down the road. If you want to do the most you can do to spend more time with  "your family and friends', cancer screenings can help with that goal.     Breast Cancer screening- Mammograms can detect breast cancer before it has spread and early detection allows the ability to remove the lesion prior to any spread. It is similar to a small rotten spot on fruit. If the spoiled part is removed quickly it can preserve the rest of the fruit, but if it is left alone it will corrupt the whole peach.     Smoking Cessation- "Smoking is like a chimney. The tar builds up and causes a back draft which leads to a cough. Smoking is like sitting in a car with a blocked exhaust system." Smoking can increase your risk for lung, mouth, throat, nose, esophagus, bladder, kidney, ureter, pancreas, stomach, liver, cervix, ovary, bowel, and leukemia [2]. If you have smoked in the past, you might meet the criteria for a CT lung cancer screening.     Lung Cancer Screening - "The U.S. Preventive Services Task Force (USPSTF) recommendsexternal icon yearly lung cancer screening with LDCT for people who--have a 20 pack-year or more smoking history, and smoke now or have quit within the past 15 years, and are between 50 and 80 years old. A pack-year is smoking an average of one pack of cigarettes per day for one year. For example, a person could have a 20 pack-year history by smoking one pack a day for 20 years or two packs a day for 10 years." [3]    Hypertension - Common high blood pressure meds may lower colorectal cancer risk-JENNY, July 6, 2020 - Hypertension Journal Report Medications commonly prescribed to treat high blood pressure may also reduce patients' colorectal cancer risk, according to new research published today in Hypertension, an American Heart Association journal." [4].     Low HbA1c - "Higher HbA1c levels (within both the non?diabetic and diabetic ranges) were associated with the risk of colorectal cancer (Model 2; P linear?=?0.009), especially for colon cancer." [5].    A healthy diet, exercise, " limitation of alcohol use, certain infections, avoidance of radiation/environmental toxins, and staying lean lowers your cancer risk [6].     I want to empower you to make informed choices for your health. I have a listed below the most common blood components that we check for when you get blood work. I discuss what each component measures along with common reasons for why they can be abnormal. If you are interested in understanding your blood work better, please read the lab explanation attached below.     Explanation of Lab Results    Please note: This information is included as a reference to help you better understand your lab results and is not be used for diagnosis.     Lipid Panel:  Cholesterol is a measure of cardiac risk and stroke risk. A lipid panel measures total cholesterol and provides readings which are broken down into 3 subsections: Triglycerides, HDL and LDL.    Total Cholesterol: Your total blood cholesterol is a measure of LDL cholesterol, HDL cholesterol, and other lipid components.  If your individual lipid level components are in the normal range and you have an elevated total cholesterol level we are generally not to concerned since we primarily look at the LDL cholesterol which is considered the bad cholesterol which can lead to heart attacks and strokes.  Your calculated cardiac risk is the most important factor in deciding if you would benefit from a cholesterol-lowering medication that the total cholesterol level.    Triglycerides are most diet and weight sensitive. They are affected easily by lifestyle changes. Ideally, this reading should be less than 150, although if the remainder of the cholesterol panel is within normal limits, we will tolerate upwards of 250-300. For the most part, if triglycerides are the only part of your cholesterol panel reading as 'abnormal', it is best to lose weight and modify your diet, including less saturated fat and less simple sugars. We only start  medication to lower this is the level is greater than 500 since this can increase ones risk for pancreatitis. This is not the cholesterol type that leads to heart attacks.     HDL is your 'good cholesterol.' Ideally, the reading should be over 40 and is particularly helpful when it is greater than 60. Research indicates that high HDL is extremely protective; and if your HDL level is greater than 60, we tolerate far worse LDL or triglyceride levels. For the most part, HDL is responsive to aerobic activity and ideal weight. We do not have medicines that increase the HDL reading very easily.    LDL is your 'bad cholesterol.' Our goals depend on how many cardiac risk factors you have.     High LDL: If you are diabetic or have had a heart attack, we like the LDL level to be less than 100. If you have 2 cardiac risk factors (such as diabetes, hypertension, family history, a low HDL and smoking), we like your LDL to be less than 130. If you have 0-2 cardiac risk factors, we like your LDL level to be less than 160, but we may not necessarily initiate medications to 190 unless you cannot lower it. The latest guidelines recommend to consider taking a statin only if your ASCVD cardiac risk score is at least greater than 7.5% and a strong recommendation if your risk score is greater than 10%.     Low LDL: Normal or low LDL is considered good and having an LDL below the normal range is not of a concern.     Complete Blood Count (CBC):    White blood cells: These are infection fighting cells. Mild fluctuations may represent minor illness at the time of blood draw. Marked fluctuations can represent immune diseases. This can also be elevated from smoking.     High WBC: Elevation in wbc can commonly be caused by an infection, steroid use, or any cause of inflammation such as many rheumatologic diseases, surgery, or trauma.      Low WBC: Many different things can cause this such as infections, medications, rheumatologic disorders,  malignancies, nutritional deficiencies, and a normal variant in some individuals. If this occurs it is common practice to rule out a few viruses such as HIV, Hep C, B12, folate along with a a peripheral blood smear to look for abnormalities. If you are otherwise healthy with no concerning symptoms and the workup is negative we usually monitor it with yearly blood work.  If there are other abnormal cell line abnormalities sometimes we refer to hematology to further evaluate.    Hemoglobin: A key indicator for anemia. Ranges depend on age. If you are significantly out of this range, we may need to talk with you.    High Hemoglobin: This can be elevated in some blood disorders, sleep apnea, chronic lung disease, kidney disease, testosterone use, dehydration, smoking, along with some other rare causes.     Low Hemoglobin: Many things can lead to this but the most common cause is an iron deficiency in females who lose blood during their periods, decreased absorption due to antacids, poor diet, sickle cell, anemia of chronic disease, malignancy, bleeding from the gut, along with some other less common causes. If you are a young female who has periods it is usually assumed that this is from that blood loss unless other symptoms or abnormal blood work is present. If you are above 45 and have an iron deficiency it is always recommended to get a colonoscopy to ensure that there is no lesion causing blood loss and to exclude malignancy.     Hematocrit: Another way of looking at anemia, much like your hemoglobin. If you are significantly out of this range, we may need to talk with you.    MCV: This looks at the size of your red blood cells.     High MCV:  A large number may indicate a B-12 deficiency, folate deficiency, alcohol use, liver disease, medication-induced phenomenon, or a need for further workup.    Low MCV: A small number may imply iron anemia or genetic disorder such as alpha-thalassemia. We may check iron studies  called to see if you are low.    MCH: Much like MCV above.    Platelets: These are clotting factors. We tolerate a broad range in this. If your numbers are less than 100, we may need to work it up.     High Platelet Count: If your numbers are elevated, this could represent ongoing inflammation within the body which can be caused by any infection, illness, iron deficiency, or other malignancy. We usually recheck it to monitor for improvement and if it does not resolve will work it up with more blood work.     Low Platelet Count: Many things can cause this such as infections, alcohol use, medications, liver disease, vitamin deficiencies, aspleenia, and some other rare blood disorders. If it persists many times we refer to hematology if a cause is not identified.     Iron:  This is not a reliable blood marker since this fluctuates throughout the day and if you are fasting many times your iron level in your blood is low but this does not necessarily mean you have a deficiency.  There are more reliable ways to measure your iron stores and these are discussed below.    Transferrin:  Many things can cause an abnormal result and this is not as important as some other labs mentioned below.    TIBC:  This is the total iron-binding capacity and this measures the total amount of iron that can be bound by proteins in the blood.  If you have an elevated TIBC this is a good marker that you could be low on iron and this is useful blood marker.  A simple way to think of this is seats in a car. The TIBC is the number of open seats that iron can sit in. If you have a high TIBC (number of open seats) that means you have a low iron level.     Ferritin:  A low ferritin is almost always caused from an iron deficiency.  A normal ferritin level does not need necessarily exclude an iron deficiency since many inflammatory conditions such as kidney disease, diabetes, arthritis, and obesity can raise this level hiding an iron deficiency.  If  you are healthy with no inflammatory conditions this is a very reliable test for detecting an iron deficiency since nothing else lowers your ferritin level except a low iron level. Keep in mind that you can have an iron deficiency if your ferritin level is normal but your TIBC is elevated.  If you have a low iron level the next step is always to identify why you have a low iron level.    CMP (Comprehensive Metabolic Panel):  Broadly, this is a test of organ function including the kidneys, liver, and electrolyte levels.    Glucose: This is primarily looking for diabetes. We like your blood glucose level to be less than 100 when fasting. Readings of 100-125 indicate what we call 'pre-diabetes' or 'glucose intolerance.' This does not necessarily indicate diabetes, but we may check another test called a hemoglobin A1C for confirmation. This level puts you at great risk for becoming a true diabetic and we would encourage the reduction of simple sugars and processed white flour as well as appropriate weight loss. If this number is greater than 125, it is likely you are diabetic; we will get an additional hemoglobin A1C test and will likely schedule you for an appointment. If you notice that your blood glucose is above 125 and we have not scheduled a follow-up appointment, please call us. Patients who are known diabetics can have readings greater than 125.    Urea Nitrogen: This is a kidney function and maybe elevated because of mild dehydration or because of excessive muscle breakdown from aggressive exercise habits.    Creatinine: This also is a kidney function test. It may be mildly elevated if you have particularly large muscle bulk or taking a supplement like creatine. It is related to the GFR. It is a muscle product that we track to look at your kidney function. If this is elevated and new, we may need to talk with you. If you have had this mildly elevated in the past, it is likely that we will just track it to  ensure that it does not worsen quickly. Some medications may gently worsen this, namely blood pressure pills called ace inhibitors. To some degree, we will permit levels of up to 1.6.    Sodium: This is essentially the concentration of salt within the body. This may be mildly low because of dehydration, overhydration, diuretics, .    Potassium: This is an electrolyte that can cause muscular cramping or cardiac difficulties. It is sometimes lowered by diuretic medications.    Chloride: For the most part, this is only relevant if the other electrolytes are abnormal.    CO2: This is a function of acid balance within the body. For the most part, mild abnormalities are not important and may represent a starvation or dehydration state when blood was drawn. Many medications can change this level as well such as diuretics, acetazolamide, calcium carbonate, laxatives, aspirin, and many others.    High CO2: Many things can cause this which includes dehydration, sleep apnea, and COPD.     Low CO2: Many things can lead to a low level and if you are generally healthy we are usually  not concerned. Diarrhea, renal disease, diabetes, and many medications can cause this.     Anion Gap: Only relevant if your CO2 is abnormal.    Calcium: This is not related to dietary intake of calcium. It may fluctuate gently based on the amount of protein within your body. If it is above 10.9, we may need to do additional testing. Many times if low the albumin level is low and once the corrected calcium level is calculated the calcium is in a normal range.     Total protein: This looks at protein within the body. Markedly elevated levels can represent an immune response and may require further workup.    Albumin: This may be elevated because of particularly high level of fitness. If it is markedly suppressed, it may represent organ dysfunction, particularly in the liver or kidneys.    AST and ALT: These are enzymes in the liver and if elevated can  indicate liver damage.     Elevated AST/ALT: Many things can caused elevated liver enzymes and the most common reason is viral infections, alcohol use, medications, supplements, autoimmune, along with some other rare causes. One of the most common reasons for a mild elevation in liver enzymes (less than 3 times the upper limit) is fatty liver disease. Many individuals who have extra fat in their diet store this in the liver and this can build up and cause a mild elevation. This is usually diagnosed with a liver ultrasound and exclusion of other causes. The only treatment for this is diet and exercise along with avoidance of liver toxic medications and alcohol. It is standard of care to rule our viral hepatitis and get imaging of the liver if elevated. This is monitored and if I feel concerned will refer to hepatology.     Alk Phos: Part of liver function or bones    High Alk Phos: elevated levels may indicate a liver injury or obstruction of bile flow. Elevated levels can also be seen in vitamin D deficiency, drug induced, or bone disorders.     Low Alk Phos: In most cases having a low Alkaline Phosphatase enzyme activity is not due to any disease and is simply a normal variant.  Having an elevated Alk Phos is more concerning and associated with many diseases and thus I would not be overly concerned with a low level which is seen in many health individuals. The reasons for a low serum alkaline phosphatase activity were reviewed in a 1-yr retrospective study and in this study it was found that no cause was found in most cases.  Low activity values were recorded in several individuals in the absence of any obvious cause. This would suggest that the definition of the lower limit of the reference range for alkaline phosphatase is arbitrary, thus limiting the use of low serum activity as a marker of disease.  In some cases micronutrients like Zinc (Zn) and Magnesium (Mg) are causes of low ALP activity and you can take  zinc or magnesium supplements. Unfortunately, the blood work to measure zinc and magnesium levels are unreliable and not very accurate since it does not test for the intracellular zinc or magnesium levels.     Yusra WHALEYD, Feng BARNETT, Susanna DOMINGUEZ. Clinical significance of a low serum alkaline phosphatase. [facility] J Med. 1992 Feb;40(1-2):9-14. PMID: [id number].  Volodymyr MIRAMONTES S, Santhosh B, Maribeth I, Behera S, Volodymyr S. Low Alkaline Phosphatase (ALP) In Adult Population an Indicator of Zinc (Zn) and Magnesium (Mg) Deficiency. Curr Res Nutr Food Sci 2017;5(3). doi : http://dx.[email or web address]    Total Bilirubin: This is also part of liver function.    High T Bili: If you have right upper quadrant pain an elevation in total bilirubin can be caused by a gallbladder stone that is blocking the biliary tract that leads to your gastrointestinal tract. If you have fever and right upper quadrant pain this can sometimes be elevated when your gallbladder is infected and most individuals have nausea with vomiting associated with it. If you have no symptoms and are otherwise healthy this can be caused by Gilbert syndrome which is a benign normal variant. There are other causes such as some anemias but there would be abnormal blood counts.     Low T Bili: Nothing to be concerned about.     Thyroid Stimulating Hormone (TSH): This reading is an indicator of your thyroid function. The thyroid regulates energy levels throughout the entire body, affecting almost every organ system. This is an inverse relationship so a high number actually presents a low thyroid. If this is abnormal, we will often check an additional lab called a Free T4 to evaluate this more carefully. Borderline elevations, those of 5-10, can be watched or worked up further. Please do not take the supplement Biotin for at least a week prior to getting your TSH checked since this can lead to false measurement levels.     Prostate Specific Antigen (PSA): (Men only.) This  "is a prostate cancer screening test, and is no longer a routine screening test. Levels are truly a function of age. Being less than 4 is typical for someone more in their 60s. If you are young, it should probably be less than 3. A higher PSA result does not necessarily mean that you have cancer, but may indicate a need for a discussion with your provider. Options include observation to look at rate of rise or prostate biopsy. Not only is the absolute value important, but how much it has changed from previous years. Please ensure that there is not a dramatic rise from previous years.    Please Note: This information is included as a reference to help you better understand your lab results and is not be used for diagnosis.    Frequently asked questions    When should I take my blood pressure medication?    The latest studies show that taking your blood pressure medication at night is the best time. A recent study showed that this prevents more heart attacks and strokes. See the answer below from [place].     "Q. I've taken blood pressure medicines every morning for many years, and they keep my pressure under control. Recently, my doctor recommended taking them at bedtime, instead. Does that make sense?    A. It actually does make sense -- based on recent research. For many years, there have been at least three theoretical reasons for taking blood pressure medicines before bedtime. First, a body system that strongly affects blood pressure, called the renin-angiotensin system, has its peak activity during sleep. Second, circadian rhythms cause differences in the body chemistry at night compared with daytime. Third, most heart attacks occur in the morning, before medicines taken in the morning have a chance to "kick in."" [6].     When should I take my cholesterol medication?    It used to be recommended to take your cholesterol medication at night since the original statins that lowered cholesterol did not last 24 hours " "and most cholesterol synthesis is done at night. The long acting statins such as atorvastatin and rosuvastatin last 24 hours so they can be taken any time during the day. Simvastatin, pravastatin, fluvastatin, and lovastatin are shorter acting and should be taken at bed time.     Can supplements affect my blood work?    Yes they can. A very important supplement to not take for at least a week prior to your blood work is biotin. "Biotin supplement use is common and can lead to the false measurement of thyroid hormone in commonly used assays." [8.]    What are conditions that should not be addressed during a virtual visit?    There are some conditions that should not be evaluated via a virtual visit since optimal care is impossible. Chest pain, shortness of breath, lung conditions, abdominal pain, and any neurological complaint such as headache, dizziness, numbness ect.         When will I get commentary of my blood work?    I review all blood work that you get and I will send out commentary on this via [employer] within 72 hours. In most cases you will get a message from me sooner, but many times not all of the blood work is completed thus I usually wait until all results have returned. If there is a critical abnormality you should be contacted the same day you got blood work.     How frequently do I need to have visits to get controlled substances?    It is standard protocol to have a visit every 3 months if you are taking scheduled substances such as ADHD medications, psychiatric medications, and pain medications. This is to ensure your safety and monitor for any side effects.     When should I bring prior to a visit if I want to lose weight?    I recommend that you make a food diary for a week and fill out what you ate each day. You can bring this form in to your visit and I can look over it to suggest changes that you can make.     Which over the counter medications should I avoid if I have decreased kidney " function?    NSAIDs which includes ibuprofen (Advil, Motrin, Nuprin), naproxen (Aleve), meloxicam (Mobic) and diclofenac(Zorvolex, Voltaren) and ketorolac (Toradol) can damage your kidneys if you take this long term.Tylenol does not affect your kidney and thus is safe as long as you don't have liver disease.     Is there an [facility]?     Yes! The [facility] is located on the first floor of the [facility]. The address is listed below. You can get curbside pickup if you call their number at [phone]. One of the many benefits of using the [facility] is that the pharmacists can contact me directly if a cheaper alternative is available to save you money. They also see your note to know more about what the medication was prescribed for. I recommend this pharmacy since communication with me is quick in case any confusion arises on your medications.     [address] [place] [address].  [address] [place] [id number]  Phone: [phone]    Hours:  Monday - Friday  8:00 a.m. - 5:30 a.m.    Why is it not in my best interest to call in order to get an antibiotic?    Medicine is a complex field and many times the correct diagnosis is critical in order to provide the correct care. One of the most important goals of a healthcare provider is to ensure that no dangerous condition is masquerading as a mild illness. Specific questions are very important to obtain during an examination that provide a wealth of information to understand your illness. Health care providers are trained to investigate for signs that can be dangerous to your health. Messaging or calling the office in order to get an antibiotic can be very dangerous.     For example, many urinary tract infections can lead to an infection in the kidney that can result in a serious blood stream infection that can lead to hospitalization if not recognized. A cough can be caused by many different things and not necessarily an infection. It is not uncommon that one  assumes a cough is from an infection when it is actually caused by a blood clot in the lungs. This can lead to death. Determining your risk can only be performed after a thorough history and examination. A few sentences through e-mail is not enough.     What are some common symptoms that should be evaluated by the emergency department and not by phone or e-mail?    This does not include every symptom, but common examples of symptoms that should prompt one to go to the emergency department are chest pain, chest pressure, shortness of breath, difficulty breathing, abdominal pain, weakness or numbness or an extremity, sudden weakness or drooping on one side of the body, speaking difficulty, unusual or bad headache (particularly if it started suddenly), head injury, confusion, seizure, passing out, lightheaded, pain in arm or jaw, suddenly not able to speak, see, walk, or move, dizziness, neck stiffness, suicidal thoughts, testicular pain, cuts and wounds, severe pain, along with many others. This is not an inclusive list.     Outside Records    It is common to have an colonoscopy, mammogram, PAP smear, or eye exam done outside the [facility] system. Many times we do not get the records automatically sent to us. Please call your provider's office to notify them to fax us your records so that we can have the most up to date information. Your provider will review your outside results in order to provide you with the complete care that you deserve. We appreciate that you decided to choose us to be serving on your healthcare team and the more information we have about your health is essential.     If I have a psychiatric crisis what should I do?    If you ever feel that there is a risk of a harm to yourself we recommend to go to the emergency room. There is a National Suicide Prevention lifeline number 5-116-836-TALK which route you to the nearest crisis center. There is also a suicide hotline 1-905-WXUAXVZ  "(1-293.880.6226).    Patient Education       Checking Your Blood Pressure at Home   The Basics   Written by the doctors and editors at [facility]   How is blood pressure measured? -- Blood pressure is usually measured with a device that goes around your upper arm. This is often done in a doctor's office. But some people also check their blood pressure themselves, at home or at work.  Blood pressure is explained with 2 numbers. For instance, your blood pressure might be "140 over 90." The first (top) number is the pressure inside your arteries when your heart is oswaldo. The second (bottom) number is the pressure inside your arteries when your heart is relaxed. The table shows how doctors and nurses define high and normal blood pressure (table 1).  If your blood pressure gets too high, it puts you at risk for heart attack, stroke, and kidney disease. High blood pressure does not usually cause symptoms. But it can be serious.  What is a home blood pressure meter? -- A home blood pressure meter (or "monitor") is a device you can use to check your blood pressure yourself. It has a cuff that goes around your upper arm (figure 1). Some devices have a cuff that goes around your wrist instead. But doctors aren't sure if these work as well. The meter also has a small screen, or dial, that shows your blood pressure numbers.  There are also special meters you can wear for a day or 2. These are different because they automatically check your blood pressure throughout the day and night, even while you are sleeping. If your doctor thinks you should use one of these devices, they will talk to you about how to wear it.  Why do I need to check my blood pressure at home? -- If your doctor knows or suspects that you have high blood pressure, they might want you to check it at home. There are a few reasons for this. Your doctor might want to look at:  Whether your blood pressure measures the same at home as it did in the doctor's " office  How well your blood pressure medicines are working  Changes in your blood pressure, for example, if it goes up and down  People who check their own blood pressure at home usually do better at keeping it low.  How do I choose a home blood pressure meter? -- When choosing a home blood pressure meter, you will probably want to think about:  Cost - Some devices cost more than others. You should also check to see if your insurance will help pay for your device.  Size - It's important to make sure the cuff fits your arm comfortably. Your doctor or nurse can help you with this.  How easy it is to use - You should make sure you understand how to use the device. You also need to be able to read the numbers on the screen.  You do not need a prescription to buy a home blood pressure meter. You can buy them at most pharmacies or over the internet. Your doctor or nurse can help you choose the right device for you.  How do I check my blood pressure at home? -- Once you have a home blood pressure meter, your doctor or nurse should check it to make sure it fits you and works correctly.  When it's time to check your blood pressure:  Go to the bathroom and empty your bladder first. Having a full bladder can temporarily increase your blood pressure, making the results inaccurate.  Sit in a chair with your feet flat on the ground.  Try to breathe normally and stay calm.  Attach the cuff to your arm. Place the cuff directly on your skin, not over your clothing. The cuff should be tight enough to not slip down, but not uncomfortably tight.  Sit and relax for about 3 to 5 minutes with the cuff on.  Follow the directions that came with your device to start measuring your blood pressure. This might involve squeezing the bulb at the end of the tube to inflate the cuff (fill it with air). With some monitors, you just need to press a button to inflate the cuff. When the cuff fills with air, it feels like someone is squeezing your arm,  but it should not hurt. Then you will slowly deflate the cuff (let the air out of it), or it will deflate by itself. The screen or dial will show your blood pressure numbers.  Stay seated and relax for 1 minute, then measure your blood pressure again.  How often should I check my blood pressure? -- It depends. Different people need to follow different schedules. Your doctor or nurse will tell you how often to check your blood pressure, and when. Some people need to check their blood pressure twice a day, in the morning and evening.  Your doctor or nurse will probably tell you to keep track of your blood pressure for at least a few days (table 2). Then they will look at the numbers. The reason for this is that it's normal for your blood pressure to change a bit from day to day. For example, the numbers might change depending on whether you recently had caffeine, just exercised, or feel stressed. Checking your blood pressure over several days - or longer - will give your doctor or nurse a better idea of what is average for you.  How should I keep track of my blood pressure? -- Some blood pressure meters will record your numbers for you, or send them to your computer or smartphone. If yours does not do this, you will need to write them down. Your doctor or nurse can help you figure out the best way to keep track of the numbers.  What if my blood pressure is high? -- Your doctor or nurse will tell you what to do if your blood pressure is high when you check it at home. If you get a number that is higher than normal, measure it again to see if it is still high. If it is very high (above a certain number, which your doctor or nurse will tell you to watch out for), you should call your doctor right away.  If your blood pressure is only a little high, your doctor or nurse might tell you to keep checking it for a few more days or weeks, and then call if it does not go back down. Then they can help you decide what to do  "next.  All topics are updated as new evidence becomes available and our peer review process is complete.  This topic retrieved from [employer] on: Sep 21, 2021.  Topic 266839 Version 4.0  Release: 29.4.2 - C29.263  © 2021 UpToDate, Inc. and/or its affiliates. All rights reserved.  table 1: Definition of normal and high blood pressure  Level  Top number  Bottom number    High 130 or above 80 or above   Elevated 120 to 129 79 or below   Normal 119 or below 79 or below   These definitions are from the American College of Cardiology/American Heart Association. Other expert groups might use slightly different definitions.  "Elevated blood pressure" is a term doctor or nurses use as a warning. It means you do not yet have high blood pressure, but your blood pressure is not as low as it should be for good health.  Graphic 05662 Version 6.0  figure 1: Using a home blood pressure meter     This is an example of a person using a home blood pressure meter.  Graphic 248190 Version 1.0    Consumer Information Use and Disclaimer   This information is not specific medical advice and does not replace information you receive from your health care provider. This is only a brief summary of general information. It does NOT include all information about conditions, illnesses, injuries, tests, procedures, treatments, therapies, discharge instructions or life-style choices that may apply to you. You must talk with your health care provider for complete information about your health and treatment options. This information should not be used to decide whether or not to accept your health care provider's advice, instructions or recommendations. Only your health care provider has the knowledge and training to provide advice that is right for you. The use of this information is governed by the [employer] End User License Agreement, available at [email or web address]://[email or web address]/[email or web address]/about/toyin.The use of [employer] content is governed " by the [employer] Terms of Use. ©2021 UpToDate, Inc. All rights reserved.  Copyright   © 2021 UpToDate, Inc. and/or its affiliates. All rights reserved.      Daily Blood Pressure Log    Please print this form to assist you in keeping track of your blood pressure at home.      Name:  Date of Birth:       Average Blood Pressure:           Date: Time  (a.m.) Blood  Pressure: Pulse  Rate: Time  (p.m.) Blood  Pressure : Pulse  Rate: Comments:   Sample 8:37 127/83 84    Stressful morning                                                                                                                                                                                                     Wishing you good health,     Bart Burrows MD     References:  1-https://www.[email or web address]/speakers/eva_vertes  2-https://www.[employer].com.au/news/uukau-gvt-60-cancers-that-can-yc-fqunfa-bd-smoking/  3-https://www.cdc.gov/cancer/lung/basic_info/screening.htm  4-https://newsroom.heart.org/news/common-hypertension-medications-may-reduce-colorectal-cancer-risk  5-Goto A, Qasim M, Sawada N, et al. High hemoglobin A1c levels within the non-diabetic range are associated with the risk of all cancers. Int J Cancer. 2016;138(7):3503-7139. doi:10.1002/ijc.54988  6-https://www.health.[place].edu/newsletter_article/the-10-commandments-of-cancer-prevention  7-https://www.health.[place].edu/diseases-and-conditions/should-i-take-blood-pressure-medications-at-night  8-Maru JOHNSON et al 2018 Prevalence of biotin supplement usage in outpatients and plasma biotin concentrations in patients presenting to the emergency department. Clin Biochem. Epub 2018 Jul 20. PMID: [id number].

## 2022-05-05 NOTE — PROGRESS NOTES
"[facility] Note    Subjective:    The HPI and pertinent ROS is included in the Diagnostic Impression Remarks section at the end of the note. Please see below for further details. Chief complaint is at end of note.     Data reviewed 274}  Previous medical records reviewed and summarized in plan section at end of note.      The following portions of the patient's history were reviewed and updated as appropriate: allergies, current medications, past family history, past medical history, past social history, past surgical history and problem list.    He  has a past medical history of Arthritis, Elevated PSA, Essential hypertension, benign, Mixed hyperlipidemia, Prostatitis, unspecified, and Wears glasses.  He  has a past surgical history that includes Knee arthroscopy w/ meniscal repair (Left); Tonsillectomy; Hemorrhoid surgery; cataracts; and Colonoscopy (N/A, 8/14/2019).    He  reports that he quit smoking about 14 years ago. His smoking use included cigarettes. He has a 40.00 pack-year smoking history. He has never used smokeless tobacco. He reports current alcohol use of about 5.0 standard drinks of alcohol per week. He reports that he does not use drugs.  He family history includes Cancer in his brother and paternal uncle.    Review of patient's allergies indicates:   Allergen Reactions    Aminoglycosides Other (See Comments)     Lips swelled    Meperidine Itching    Penicillins Itching       Tobacco Use: Medium Risk    Smoking Tobacco Use: Former Smoker    Smokeless Tobacco Use: Never Used     Physical Examination  Wt Readings from Last 3 Encounters:   05/05/22 73 kg (160 lb 15 oz)   04/28/22 73.3 kg (161 lb 9.6 oz)   04/18/22 73.9 kg (163 lb)                Estimated body mass index is 28.51 kg/m² as calculated from the following:    Height as of this encounter: 5' 3" (1.6 m).    Weight as of this encounter: 73 kg (160 lb 15 oz).     General appearance: alert, cooperative, no distress  Pain " "over tmj joint left ear clear on left beside some cerumen no erythema or fluid tm wnl   Neck: no thyromegaly, no neck stiffness  Lungs: clear to auscultation, no wheezes, rales or rhonchi, symmetric air entry  Heart: normal rate, regular rhythm, normal S1, S2, no murmurs, rubs, clicks or gallops  Abdomen: soft, nontender, nondistended, no rigidity, rebound, or guarding.   Back: no point tenderness over spine  Extremities: peripheral pulses normal, no unilateral leg swelling or calf tenderness   Neurological:alert, oriented, normal speech, no new focal findings or movement disorder noted from baseline    BP Readings from Last 3 Encounters:   05/05/22 112/76   04/28/22 136/80   04/18/22 (!) 141/96     /76 (BP Location: Right arm, Patient Position: Sitting, BP Method: Large (Manual))   Pulse 72   Temp 98.9 °F (37.2 °C) (Oral)   Resp 16   Ht 5' 3" (1.6 m)   Wt 73 kg (160 lb 15 oz)   SpO2 (!) 93%   BMI 28.51 kg/m²    274}  Laboratory: I have reviewed old labs below:    274}    Lab Results   Component Value Date    WBC 5.31 11/08/2021    HGB 14.5 11/08/2021    HCT 43.9 11/08/2021    MCV 93 11/08/2021     11/08/2021     11/08/2021    K 4.1 11/08/2021     11/08/2021    CALCIUM 9.3 11/08/2021    CO2 25 11/08/2021     11/08/2021    BUN 12 11/08/2021    CREATININE 0.8 11/08/2021    ANIONGAP 12 11/08/2021    ESTGFRAFRICA >60 11/08/2021    EGFRNONAA >60 11/08/2021    PROT 7.2 11/08/2021    ALBUMIN 3.8 11/08/2021    BILITOT 0.8 11/08/2021    ALKPHOS 47 (L) 11/08/2021    ALT 19 11/08/2021    AST 23 11/08/2021    INR 1.0 04/06/2016    CHOL 150 11/08/2021    TRIG 74 11/08/2021    HDL 59 11/08/2021    LDLCALC 76.2 11/08/2021    TSH 1.255 10/04/2018    PSA 3.69 06/11/2013     Lab reviewed by me: Particular labs of significance that I will monitor, workup, or treat to improve are mentioned below in diagnostic impression remarks.    Imaging/EKG: I have reviewed the pertinent results and my findings " "are noted in remarks.  274}    CC:   Chief Complaint   Patient presents with    Ear Drainage    Hypertension    Follow-up    Hyperlipidemia        274}    Assessment/Plan  Tha Guzman is a 82 y.o. male who presents to clinic with:  1. TMJ (temporomandibular joint syndrome)    2. Essential hypertension    3. Mixed hyperlipidemia    4. Hyperlipidemia, unspecified hyperlipidemia type    5. Insomnia, unspecified type    6. Benign prostatic hyperplasia, unspecified whether lower urinary tract symptoms present    7. Primary osteoarthritis of both knees       274}  Diagnostic Impression Remarks + HPI     Documentation entered by me for this encounter may have been done in part using speech-recognition technology. Although I have made an effort to ensure accuracy, "sound like" errors may exist and should be interpreted in context.      TMJ syndrome-patient reports that for the past 2 weeks he has had some left ear pain along with some jaw pain left side worse with chewing and he reports some grinding at night.  He did get his ears check was given some steroids previously without improvement along with antihistamines.  No fever chills no sore throat or viral symptoms tympanic membrane is clear no signs of infection and opening and closing the mouth with pressing the TMJ joint cause some similar pain.  Recommend anti-inflammatories which he reports has helped his pain also can try muscle relaxer but he will hold on this could try some Voltaren cream mouth guard activity modifications and avoid triggers and handout given on this will monitor   Hypertension well controlled continue current meds will get blood work   Insomnia-well controlled trazodone continue   Hyperlipidemia-stable continue healthy diet   Osteoarthritis-needs improvement takes Norco for breakthrough went over the side effects extensively discussed risk benefits will send in some since he has not take it every day   BPH-stable continue current " meds    The patient is meeting the goals of opioid therapy and is dependent on them for functionality and can not perform ADLS without them. Regarding opioids, the risks were discussed including tolerance, addiction, overdose, over-sedation, drug interactions, liver damage, hormone imbalance, dental decay, opioid-induced hyperalgesia, and even death. I cautioned the patient that the medications that are being taken are dangerous and can result in death from overdose. They can also result in death if taken by someone else without medical supervision. I also warned the patient to not drive or operate heavy machinery while taking these medications. I discussed the use of narcan if needed and he verbalized understanding. The patient agreed to teach a family member living in the same home on how and when to use it if the scenario of a drug overdose occurs. Shared decision making occurred and he agreed with this treatment plan.     I discussed with him the proper use of narcotics, including storing the narcotics in a safe and locked place and promptly disposing any unused narcotics once he no longer needs them. We specifically discussed that he is never to give any medication, and especially narcotic pain medication, to another person and they are only for him.  We talked about the risks including respiratory depression and about Naloxone as an antidote for narcotic overdose. We discussed that he is to avoid alcohol, benzodiazapenes, and sedating drugs while taking narcotics.    We specifically discussed non-opioid alternatives and patient offered lower dose of opioid medication.    He has no suicidal thoughts or intent and he has no weapons access and he is future oriented with support in the community and I emphasized that if he ever develops thoughts of hurting he to directly go to the emergency department which he agreed to and he is willing to have close follow up with me.     was reviewed today (05/05/2022) and  determined to be appropriate for continued opioid therapy.    The addictive potential of the medications were discussed and he agrees to inform us or the PCP if any compulsion to taking his medications for any reason other than for pain arises. He agrees to safe-guard all medications from unintentional or intentional use or misuse by other persons who may potentially have access to their premises, including but not limited to significant others, children, parents, friends, and even strangers. He takes sole responsibility for any consequences that may occur from not doing so.    This is the extent of the patient's concerns at this present time. He did not feel chest pain upon exertion, dyspnea, nausea, vomiting, diaphoresis, and syncope. No pleuritic chest pain, unilateral leg swelling, calf tenderness, or calf pain.     Tha will return to clinic in a few months for further workup and reassessment or sooner as needed. He was instructed to call the clinic or go to the emergency department if his symptoms do not improve, worsens, or if new symptoms develop. As we discussed that symptoms could worsen over the next 24 hours he was advised that if any increased swelling, pain, or numbness arise to go immediately to the ED. Patient knows to call any time if an emergency arises. Shared decision making occurred and he verbalized understanding in agreement with this plan.     I discussed imaging findings, diagnosis, possibilities, treatment options, medications, risks, and benefits. He had many questions regarding the options and long-term effects. All questions were answered. He expressed understanding after counseling regarding the diagnosis and recommendations. He was capable and demonstrated competence with understanding of these options. Shared decision making was performed resulting in him choosing the current treatment plan. Patient handout was given with instructions and recommendations. Advised the patient that if  they become pregnant to alert us immediately to assess for medication changes.     I also discussed the importance of close follow up to discuss labs, change or modify his medications if needed, monitor side effects, and further evaluation of medical problems.     Additional workup planned: see labs ordered below.    See below for labs and meds ordered with associated diagnosis      1. TMJ (temporomandibular joint syndrome)    2. Essential hypertension  - CBC Auto Differential; Future  - Comprehensive Metabolic Panel; Future  - Hemoglobin A1C; Future    3. Mixed hyperlipidemia    4. Hyperlipidemia, unspecified hyperlipidemia type    5. Insomnia, unspecified type    6. Benign prostatic hyperplasia, unspecified whether lower urinary tract symptoms present    7. Primary osteoarthritis of both knees  - HYDROcodone-acetaminophen (NORCO) 7.5-325 mg per tablet; 1 tablet PO TID prn severe pain  Dispense: 90 tablet; Refill: 0    Medication List with Changes/Refills   Current Medications    ALFUZOSIN (UROXATRAL) 10 MG TB24    Take 1 tablet (10 mg total) by mouth daily with breakfast.    AMLODIPINE-BENAZEPRIL 5-20 MG (LOTREL) 5-20 MG PER CAPSULE    Take 1 capsule by mouth once daily.    ASPIRIN (ECOTRIN) 81 MG EC TABLET    Take 81 mg by mouth once daily.    FINASTERIDE (PROSCAR) 5 MG TABLET    Take 1 tablet (5 mg total) by mouth once daily.    HYDROCORTISONE (ANUSOL-HC) 25 MG SUPPOSITORY        SIMVASTATIN (ZOCOR) 20 MG TABLET    Take 1 tablet (20 mg total) by mouth every evening.    SOLIFENACIN (VESICARE) 10 MG TABLET    Take 1 tablet (10 mg total) by mouth once daily.    TRAZODONE (DESYREL) 50 MG TABLET    TAKE 1 TABLET BY MOUTH NIGHTLY AS NEEDED FOR INSOMNIA.   Changed and/or Refilled Medications    Modified Medication Previous Medication    HYDROCODONE-ACETAMINOPHEN (NORCO) 7.5-325 MG PER TABLET HYDROcodone-acetaminophen (NORCO) 7.5-325 mg per tablet       1 tablet PO TID prn severe pain    1 tablet PO TID prn severe pain    Discontinued Medications    PREDNISONE (DELTASONE) 20 MG TABLET    One BID for 3 days then once a day orally     Modified Medications    Modified Medication Previous Medication    HYDROCODONE-ACETAMINOPHEN (NORCO) 7.5-325 MG PER TABLET HYDROcodone-acetaminophen (NORCO) 7.5-325 mg per tablet       1 tablet PO TID prn severe pain    1 tablet PO TID prn severe pain       Bart Burrows MD   274}  05/05/2022     This note was completed with dictation software and grammatical errors may exist.    If you are due for any health screening(s) below please notify me so we can arrange them to be ordered and scheduled to maintain your health.     Health Maintenance Due   Topic Date Due    Shingles Vaccine (1 of 2) Never done    COVID-19 Vaccine (4 - Booster for Pfizer series) 03/08/2022

## 2022-07-21 NOTE — PROGRESS NOTES
"[facility] Note    PCP: Bart Burrows MD    Chief Complaint: BPH    SUBJECTIVE:       History of Present Illness:  Tha Guzman is a 82 y.o. male who presents to clinic for BPH. He is Established  to our clinic.     Previously given trial of Vesicare 10 mg.   This has helped greatly. Frequency and incontinence are much better.   Side effects are manageable.   Still on the Finasteride and Uroxatral.     For the past week he has been having some slight dysuria and suprapubic discomfort.   Having regular bowel movements.     No further hematospermia.     UA today negative for blood, leuks, nitrite   PVR 84 cc    4/18/22  Patient was previously started on Finasteride. Has not noticed a big difference on this.   Urinating every 1.5-2 hours. He does have some urgency and mild urge incontinence.   Weak stream and straining to urinating.     No hematuria or dysuria.   Twice he has taken cipro twice for 5 days due to bothersome prostatitis symptoms.     PVR: 64 cc    10/14/21  Previous patient of Dr. Hawkins, last seen Jan 2021.  Hx of BPH for which he is on Uroxatral.   Hx of microscopic hematuria s/p negative workup in 2016.    Was seen by Gabriella on 9/16 and was prescribed bactrim x 21 days for prostatitis. He takes that this was a "copper/brown discharge" in his underwear. This was not associated with urination.   At that time he was having some discomfort with sitting and bowel movements.   Has regular daily bowel movements.     No hematuria. Some discomfort with urination. No dysuria.   Has some urinary frequency q 2 hours. Stream is weak. Sits to urinate. Has some urgency and urge incontinence.     UA today: +trace blood, leuk and nitrite negative   PVR today: 74 cc    Last urine culture: no documented UTIs  PSA 1/14/21: 4.1    Lab Results   Component Value Date    CREATININE 0.8 05/05/2022     Family  hx: brother and uncle had prostate cancer   Hx of HLD, HTN, IBS    Past medical, " "family, and social history reviewed as documented in chart with pertinent positive medical, family, and social history detailed in HPI.    Review of patient's allergies indicates:   Allergen Reactions    Aminoglycosides Other (See Comments)     Lips swelled    Meperidine Itching    Penicillins Itching       Past Medical History:   Diagnosis Date    Arthritis     Elevated PSA     Essential hypertension, benign     Mixed hyperlipidemia     Prostatitis, unspecified     Wears glasses      Past Surgical History:   Procedure Laterality Date    cataracts      COLONOSCOPY N/A 8/14/2019    Procedure: COLONOSCOPY;  Surgeon: Dominick Sr MD;  Location: [facility];  Service: Endoscopy;  Laterality: N/A;    HEMORRHOID SURGERY      KNEE ARTHROSCOPY W/ MENISCAL REPAIR Left     TONSILLECTOMY       Family History   Problem Relation Age of Onset    Cancer Brother         lunga cancer    Cancer Paternal Uncle         prostate cancer    Melanoma Neg Hx     Psoriasis Neg Hx     Lupus Neg Hx     Eczema Neg Hx      Social History     Tobacco Use    Smoking status: Former Smoker     Packs/day: 1.00     Years: 40.00     Pack years: 40.00     Types: Cigarettes     Quit date: 7/18/2007     Years since quitting: 15.0    Smokeless tobacco: Never Used   Substance Use Topics    Alcohol use: Yes     Alcohol/week: 5.0 standard drinks     Types: 6 Standard drinks or equivalent per week     Comment: OCCASSIONALLY    Drug use: No        Review of Systems   Genitourinary: Positive for dysuria. Negative for difficulty urinating, frequency, hematuria, nocturia, penile swelling, scrotal swelling, testicular pain and urgency.       OBJECTIVE:     Anticoagulation:  Aspirin 81 mg     Estimated body mass index is 29.52 kg/m² as calculated from the following:    Height as of this encounter: 5' 3" (1.6 m).    Weight as of this encounter: 75.6 kg (166 lb 10.7 oz).    Vital Signs (Most Recent)  Pulse: (!) 115 (07/25/22 0947)  Resp: 18 " (07/25/22 0947)  BP: (!) 161/103 (07/25/22 0947)    Physical Exam  Constitutional:       General: He is not in acute distress.     Appearance: Normal appearance. He is not ill-appearing.   HENT:      Head: Normocephalic and atraumatic.   Eyes:      General: No scleral icterus.  Cardiovascular:      Rate and Rhythm: Normal rate and regular rhythm.   Pulmonary:      Effort: Pulmonary effort is normal. No respiratory distress.   Abdominal:      General: There is no distension.   Skin:     Coloration: Skin is not jaundiced.   Neurological:      General: No focal deficit present.      Mental Status: He is alert and oriented to person, place, and time.   Psychiatric:         Mood and Affect: Mood normal.         Behavior: Behavior normal.         Thought Content: Thought content normal.         BMP  Lab Results   Component Value Date     05/05/2022    K 3.8 05/05/2022     05/05/2022    CO2 24 05/05/2022    BUN 20 05/05/2022    CREATININE 0.8 05/05/2022    CALCIUM 9.3 05/05/2022    ANIONGAP 11 05/05/2022    ESTGFRAFRICA >60.0 05/05/2022    EGFRNONAA >60.0 05/05/2022       Lab Results   Component Value Date    WBC 7.93 05/05/2022    HGB 14.4 05/05/2022    HCT 43.7 05/05/2022    MCV 91 05/05/2022     05/05/2022       Lab Results   Component Value Date    PSA 3.69 06/11/2013    PSA 3.75 12/07/2012    PSA 4.91 (H) 09/26/2012    PSA 3.75 03/29/2012    PSA 4.51 (H) 03/07/2012    PSA 2.8 12/20/2010    PSA 3.2 09/13/2010    PSA 1.8 03/03/2010    PSADIAG 4.1 (H) 01/14/2021    PSADIAG 3.7 01/06/2020    PSADIAG 5.9 (H) 10/25/2019    PSADIAG 3.7 12/03/2018    PSADIAG 3.9 11/02/2017    PSADIAG 4.0 04/26/2017    PSADIAG 2.9 01/12/2016    PSADIAG 3.2 07/13/2015    PSADIAG 3.4 06/18/2014    PSADIAG 3.2 12/13/2013     Imaging:  US Retroperitoneal 3/23/16: Small bilateral renal cysts.  Prostate hypertrophy.    ASSESSMENT     1. Benign prostatic hyperplasia, unspecified whether lower urinary tract symptoms present    2.  Hyperlipidemia, unspecified hyperlipidemia type    3. Primary hypertension    4. Irritable bowel syndrome, unspecified type        PLAN:     - Doing very well on current medication regimen and would like to continue with this  - Again offered workup with cysto/TRUS, however he is not interested in surgery at this point   - Urine today is clear with no signs of infection    - Follow up in 6 months     I spent 30 minutes with the patient. Over 50% of the visit was spent in counseling.      Emy Pompa MD

## 2022-07-25 ENCOUNTER — OFFICE VISIT (OUTPATIENT)
Dept: UROLOGY | Facility: CLINIC | Age: 82
End: 2022-07-25
Payer: MEDICARE

## 2022-07-25 VITALS
RESPIRATION RATE: 18 BRPM | SYSTOLIC BLOOD PRESSURE: 161 MMHG | HEART RATE: 115 BPM | BODY MASS INDEX: 29.54 KG/M2 | WEIGHT: 166.69 LBS | HEIGHT: 63 IN | DIASTOLIC BLOOD PRESSURE: 103 MMHG

## 2022-07-25 DIAGNOSIS — N40.0 BENIGN PROSTATIC HYPERPLASIA, UNSPECIFIED WHETHER LOWER URINARY TRACT SYMPTOMS PRESENT: Primary | ICD-10-CM

## 2022-07-25 DIAGNOSIS — K58.9 IRRITABLE BOWEL SYNDROME, UNSPECIFIED TYPE: ICD-10-CM

## 2022-07-25 DIAGNOSIS — E78.5 HYPERLIPIDEMIA, UNSPECIFIED HYPERLIPIDEMIA TYPE: ICD-10-CM

## 2022-07-25 DIAGNOSIS — I10 PRIMARY HYPERTENSION: ICD-10-CM

## 2022-07-25 LAB
BILIRUB SERPL-MCNC: NORMAL MG/DL
BLOOD URINE, POC: NORMAL
CLARITY, POC UA: CLEAR
COLOR, POC UA: YELLOW
GLUCOSE UR QL STRIP: NORMAL
KETONES UR QL STRIP: NORMAL
LEUKOCYTE ESTERASE URINE, POC: NORMAL
NITRITE, POC UA: NORMAL
PH, POC UA: 7
POC RESIDUAL URINE VOLUME: 84 ML (ref 0–100)
PROTEIN, POC: NORMAL
SPECIFIC GRAVITY, POC UA: 1.01
UROBILINOGEN, POC UA: 0.2

## 2022-07-25 PROCEDURE — 1101F PT FALLS ASSESS-DOCD LE1/YR: CPT | Mod: CPTII,S$GLB,, | Performed by: STUDENT IN AN ORGANIZED HEALTH CARE EDUCATION/TRAINING PROGRAM

## 2022-07-25 PROCEDURE — 99214 PR OFFICE/OUTPT VISIT, EST, LEVL IV, 30-39 MIN: ICD-10-PCS | Mod: S$GLB,,, | Performed by: STUDENT IN AN ORGANIZED HEALTH CARE EDUCATION/TRAINING PROGRAM

## 2022-07-25 PROCEDURE — 3288F PR FALLS RISK ASSESSMENT DOCUMENTED: ICD-10-PCS | Mod: CPTII,S$GLB,, | Performed by: STUDENT IN AN ORGANIZED HEALTH CARE EDUCATION/TRAINING PROGRAM

## 2022-07-25 PROCEDURE — 81002 URINALYSIS NONAUTO W/O SCOPE: CPT | Mod: S$GLB,,, | Performed by: STUDENT IN AN ORGANIZED HEALTH CARE EDUCATION/TRAINING PROGRAM

## 2022-07-25 PROCEDURE — 1159F PR MEDICATION LIST DOCUMENTED IN MEDICAL RECORD: ICD-10-PCS | Mod: CPTII,S$GLB,, | Performed by: STUDENT IN AN ORGANIZED HEALTH CARE EDUCATION/TRAINING PROGRAM

## 2022-07-25 PROCEDURE — 1126F AMNT PAIN NOTED NONE PRSNT: CPT | Mod: CPTII,S$GLB,, | Performed by: STUDENT IN AN ORGANIZED HEALTH CARE EDUCATION/TRAINING PROGRAM

## 2022-07-25 PROCEDURE — 3080F PR MOST RECENT DIASTOLIC BLOOD PRESSURE >= 90 MM HG: ICD-10-PCS | Mod: CPTII,S$GLB,, | Performed by: STUDENT IN AN ORGANIZED HEALTH CARE EDUCATION/TRAINING PROGRAM

## 2022-07-25 PROCEDURE — 99999 PR PBB SHADOW E&M-EST. PATIENT-LVL III: ICD-10-PCS | Mod: PBBFAC,,, | Performed by: STUDENT IN AN ORGANIZED HEALTH CARE EDUCATION/TRAINING PROGRAM

## 2022-07-25 PROCEDURE — 81002 POCT URINE DIPSTICK WITHOUT MICROSCOPE: ICD-10-PCS | Mod: S$GLB,,, | Performed by: STUDENT IN AN ORGANIZED HEALTH CARE EDUCATION/TRAINING PROGRAM

## 2022-07-25 PROCEDURE — 1126F PR PAIN SEVERITY QUANTIFIED, NO PAIN PRESENT: ICD-10-PCS | Mod: CPTII,S$GLB,, | Performed by: STUDENT IN AN ORGANIZED HEALTH CARE EDUCATION/TRAINING PROGRAM

## 2022-07-25 PROCEDURE — 99214 OFFICE O/P EST MOD 30 MIN: CPT | Mod: S$GLB,,, | Performed by: STUDENT IN AN ORGANIZED HEALTH CARE EDUCATION/TRAINING PROGRAM

## 2022-07-25 PROCEDURE — 3077F SYST BP >= 140 MM HG: CPT | Mod: CPTII,S$GLB,, | Performed by: STUDENT IN AN ORGANIZED HEALTH CARE EDUCATION/TRAINING PROGRAM

## 2022-07-25 PROCEDURE — 3288F FALL RISK ASSESSMENT DOCD: CPT | Mod: CPTII,S$GLB,, | Performed by: STUDENT IN AN ORGANIZED HEALTH CARE EDUCATION/TRAINING PROGRAM

## 2022-07-25 PROCEDURE — 3080F DIAST BP >= 90 MM HG: CPT | Mod: CPTII,S$GLB,, | Performed by: STUDENT IN AN ORGANIZED HEALTH CARE EDUCATION/TRAINING PROGRAM

## 2022-07-25 PROCEDURE — 1159F MED LIST DOCD IN RCRD: CPT | Mod: CPTII,S$GLB,, | Performed by: STUDENT IN AN ORGANIZED HEALTH CARE EDUCATION/TRAINING PROGRAM

## 2022-07-25 PROCEDURE — 51798 POCT BLADDER SCAN: ICD-10-PCS | Mod: S$GLB,,, | Performed by: STUDENT IN AN ORGANIZED HEALTH CARE EDUCATION/TRAINING PROGRAM

## 2022-07-25 PROCEDURE — 99999 PR PBB SHADOW E&M-EST. PATIENT-LVL III: CPT | Mod: PBBFAC,,, | Performed by: STUDENT IN AN ORGANIZED HEALTH CARE EDUCATION/TRAINING PROGRAM

## 2022-07-25 PROCEDURE — 1101F PR PT FALLS ASSESS DOC 0-1 FALLS W/OUT INJ PAST YR: ICD-10-PCS | Mod: CPTII,S$GLB,, | Performed by: STUDENT IN AN ORGANIZED HEALTH CARE EDUCATION/TRAINING PROGRAM

## 2022-07-25 PROCEDURE — 3077F PR MOST RECENT SYSTOLIC BLOOD PRESSURE >= 140 MM HG: ICD-10-PCS | Mod: CPTII,S$GLB,, | Performed by: STUDENT IN AN ORGANIZED HEALTH CARE EDUCATION/TRAINING PROGRAM

## 2022-07-25 PROCEDURE — 51798 US URINE CAPACITY MEASURE: CPT | Mod: S$GLB,,, | Performed by: STUDENT IN AN ORGANIZED HEALTH CARE EDUCATION/TRAINING PROGRAM

## 2022-07-27 ENCOUNTER — PATIENT MESSAGE (OUTPATIENT)
Dept: UROLOGY | Facility: CLINIC | Age: 82
End: 2022-07-27
Payer: MEDICARE

## 2022-07-28 NOTE — TELEPHONE ENCOUNTER
Urine sample yesterday completely negative, therefore there is no infection in the urine. If he would like can come in to repeat sample. Send for micro and culture.   Pain may be GI related or inflammation, can take Ibuprofen 600 mg q6 hrs PRN.

## 2022-07-29 ENCOUNTER — PATIENT MESSAGE (OUTPATIENT)
Dept: UROLOGY | Facility: CLINIC | Age: 82
End: 2022-07-29
Payer: MEDICARE

## 2022-08-05 ENCOUNTER — OFFICE VISIT (OUTPATIENT)
Dept: ORTHOPEDICS | Facility: CLINIC | Age: 82
End: 2022-08-05
Payer: MEDICARE

## 2022-08-05 VITALS — BODY MASS INDEX: 29.54 KG/M2 | WEIGHT: 166.69 LBS | HEIGHT: 63 IN

## 2022-08-05 DIAGNOSIS — M17.12 PRIMARY OSTEOARTHRITIS OF LEFT KNEE: Primary | ICD-10-CM

## 2022-08-05 DIAGNOSIS — M17.11 PRIMARY OSTEOARTHRITIS OF RIGHT KNEE: ICD-10-CM

## 2022-08-05 PROCEDURE — 1100F PR PT FALLS ASSESS DOC 2+ FALLS/FALL W/INJURY/YR: ICD-10-PCS | Mod: CPTII,S$GLB,, | Performed by: PHYSICIAN ASSISTANT

## 2022-08-05 PROCEDURE — 1125F PR PAIN SEVERITY QUANTIFIED, PAIN PRESENT: ICD-10-PCS | Mod: CPTII,S$GLB,, | Performed by: PHYSICIAN ASSISTANT

## 2022-08-05 PROCEDURE — 1160F PR REVIEW ALL MEDS BY PRESCRIBER/CLIN PHARMACIST DOCUMENTED: ICD-10-PCS | Mod: CPTII,S$GLB,, | Performed by: PHYSICIAN ASSISTANT

## 2022-08-05 PROCEDURE — 1160F RVW MEDS BY RX/DR IN RCRD: CPT | Mod: CPTII,S$GLB,, | Performed by: PHYSICIAN ASSISTANT

## 2022-08-05 PROCEDURE — 1159F MED LIST DOCD IN RCRD: CPT | Mod: CPTII,S$GLB,, | Performed by: PHYSICIAN ASSISTANT

## 2022-08-05 PROCEDURE — 99203 PR OFFICE/OUTPT VISIT, NEW, LEVL III, 30-44 MIN: ICD-10-PCS | Mod: 25,S$GLB,, | Performed by: PHYSICIAN ASSISTANT

## 2022-08-05 PROCEDURE — 1100F PTFALLS ASSESS-DOCD GE2>/YR: CPT | Mod: CPTII,S$GLB,, | Performed by: PHYSICIAN ASSISTANT

## 2022-08-05 PROCEDURE — 99203 OFFICE O/P NEW LOW 30 MIN: CPT | Mod: 25,S$GLB,, | Performed by: PHYSICIAN ASSISTANT

## 2022-08-05 PROCEDURE — 1125F AMNT PAIN NOTED PAIN PRSNT: CPT | Mod: CPTII,S$GLB,, | Performed by: PHYSICIAN ASSISTANT

## 2022-08-05 PROCEDURE — 3288F PR FALLS RISK ASSESSMENT DOCUMENTED: ICD-10-PCS | Mod: CPTII,S$GLB,, | Performed by: PHYSICIAN ASSISTANT

## 2022-08-05 PROCEDURE — 3288F FALL RISK ASSESSMENT DOCD: CPT | Mod: CPTII,S$GLB,, | Performed by: PHYSICIAN ASSISTANT

## 2022-08-05 PROCEDURE — 20610 LARGE JOINT ASPIRATION/INJECTION: R KNEE: ICD-10-PCS | Mod: 50,S$GLB,, | Performed by: PHYSICIAN ASSISTANT

## 2022-08-05 PROCEDURE — 1159F PR MEDICATION LIST DOCUMENTED IN MEDICAL RECORD: ICD-10-PCS | Mod: CPTII,S$GLB,, | Performed by: PHYSICIAN ASSISTANT

## 2022-08-05 PROCEDURE — 20610 DRAIN/INJ JOINT/BURSA W/O US: CPT | Mod: 50,S$GLB,, | Performed by: PHYSICIAN ASSISTANT

## 2022-08-05 RX ORDER — TRIAMCINOLONE ACETONIDE 40 MG/ML
40 INJECTION, SUSPENSION INTRA-ARTICULAR; INTRAMUSCULAR
Status: DISCONTINUED | OUTPATIENT
Start: 2022-08-05 | End: 2022-08-05 | Stop reason: HOSPADM

## 2022-08-05 RX ADMIN — TRIAMCINOLONE ACETONIDE 40 MG: 40 INJECTION, SUSPENSION INTRA-ARTICULAR; INTRAMUSCULAR at 10:08

## 2022-08-05 NOTE — PROGRESS NOTES
[facility]  [address]  [place] [address]  Phone: [phone]   [phone]    Patient's PCP: Bart Burrows MD  Referring Provider: No ref. provider found    Subjective:      Chief Complaint:   Chief Complaint   Patient presents with    Left Knee - Pain     Left knee pain that started about 5 years ago. Feels like it is going to give out on him. Worse in the evening.       Past Medical History:   Diagnosis Date    Arthritis     Elevated PSA     Essential hypertension, benign     Mixed hyperlipidemia     Prostatitis, unspecified     Wears glasses        Past Surgical History:   Procedure Laterality Date    cataracts      COLONOSCOPY N/A 8/14/2019    Procedure: COLONOSCOPY;  Surgeon: Dominick Sr MD;  Location: [facility];  Service: Endoscopy;  Laterality: N/A;    HEMORRHOID SURGERY      KNEE ARTHROSCOPY W/ MENISCAL REPAIR Left     TONSILLECTOMY         Current Outpatient Medications   Medication Sig    alfuzosin (UROXATRAL) 10 mg Tb24 Take 1 tablet (10 mg total) by mouth daily with breakfast.    amlodipine-benazepril 5-20 mg (LOTREL) 5-20 mg per capsule TAKE 1 CAPSULE BY MOUTH EVERY DAY    aspirin (ECOTRIN) 81 MG EC tablet Take 81 mg by mouth once daily.    finasteride (PROSCAR) 5 mg tablet Take 1 tablet (5 mg total) by mouth once daily.    HYDROcodone-acetaminophen (NORCO) 7.5-325 mg per tablet 1 tablet PO TID prn severe pain    simvastatin (ZOCOR) 20 MG tablet Take 1 tablet (20 mg total) by mouth every evening.    solifenacin (VESICARE) 10 MG tablet Take 1 tablet (10 mg total) by mouth once daily.    traZODone (DESYREL) 50 MG tablet TAKE 1 TABLET BY MOUTH NIGHTLY AS NEEDED FOR INSOMNIA.    hydrocortisone (ANUSOL-HC) 25 mg suppository      No current facility-administered medications for this visit.       Review of patient's allergies indicates:   Allergen Reactions    Aminoglycosides Other (See Comments)     Lips swelled    Meperidine Itching     Penicillins Itching       Family History   Problem Relation Age of Onset    Cancer Brother         lunga cancer    Cancer Paternal Uncle         prostate cancer    Melanoma Neg Hx     Psoriasis Neg Hx     Lupus Neg Hx     Eczema Neg Hx        Social History     Socioeconomic History    Marital status:    Tobacco Use    Smoking status: Former Smoker     Packs/day: 1.00     Years: 40.00     Pack years: 40.00     Types: Cigarettes     Quit date: 7/18/2007     Years since quitting: 15.0    Smokeless tobacco: Never Used   Substance and Sexual Activity    Alcohol use: Yes     Alcohol/week: 5.0 standard drinks     Types: 6 Standard drinks or equivalent per week     Comment: OCCASSIONALLY    Drug use: No    Sexual activity: Not Currently     Social Determinants of Health     Financial Resource Strain: Low Risk     Difficulty of Paying Living Expenses: Not hard at all   Food Insecurity: No Food Insecurity    Worried About Running Out of Food in the Last Year: Never true    Ran Out of Food in the Last Year: Never true   Transportation Needs: No Transportation Needs    Lack of Transportation (Medical): No    Lack of Transportation (Non-Medical): No   Physical Activity: Insufficiently Active    Days of Exercise per Week: 2 days    Minutes of Exercise per Session: 60 min   Stress: Stress Concern Present    Feeling of Stress : To some extent   Social Connections: Unknown    Frequency of Communication with Friends and Family: More than three times a week    Frequency of Social Gatherings with Friends and Family: Once a week    Active Member of Clubs or Organizations: No    Attends Club or Organization Meetings: Never    Marital Status:    Housing Stability: Low Risk     Unable to Pay for Housing in the Last Year: No    Number of Places Lived in the Last Year: 1    Unstable Housing in the Last Year: No       History of present illness:  Mr. Almazan comes in today as a new patient chief  complaint of bilateral knee pain, left greater than right that has been going on for many years.  He denies any injury or trauma to the knees.  He has been told that he has osteoarthritis in his knees.  It sounds as if he has had an injection in the left knee sometime ago.  He reports he was not particularly efficacious.  He is a retired pharmacist.    Review of Systems:    Constitutional: Negative for chills, fever and weight loss.   HENT: Negative for congestion.    Eyes: Negative for discharge and redness.   Respiratory: Negative for cough and shortness of breath.    Cardiovascular: Negative for chest pain.   Gastrointestinal: Negative for nausea and vomiting.   Musculoskeletal: See HPI.   Skin: Negative for rash.   Neurological: Negative for headaches.   Endo/Heme/Allergies: Does not bruise/bleed easily.   Psychiatric/Behavioral: The patient is not nervous/anxious.    All other systems reviewed and are negative.       Objective:      Physical Examination:    Vital Signs:  There were no vitals filed for this visit.    Body mass index is 29.52 kg/m².    This a well-developed, well nourished patient in no acute distress.  They are alert and oriented and cooperative to examination.     Bilateral knee exam:  Skin to his bilateral knees is clean dry and intact.  There is no erythema or ecchymosis.  There are no signs or symptoms of infection.  Patient is neurovascularly intact throughout bilateral lower extremities.  Bilateral calves are soft and nontender.  Patient has a varus deformity to his bilateral knees.  Bilateral knee range of motion is as follows:  Right knee is approximately 3-110 degrees and left knee is approximately 3-100 degrees.  Both knees are stable to varus and valgus stresses.  Patient does have diffuse crepitus with range of motioning.  He has tender to palpation diffusely about the medial aspect of his bilateral knees.  He has a negative straight leg raise bilaterally.  He is able weight bear as  tolerated on his bilateral lower extremities but has an antalgic gait.  He has well-preserved internal/external rotation of bilateral hips without pain.    Pertinent New Results:        XRAY Report / Interpretation:   Three views were taken of his left knee today:  AP, lateral, and sunrise views.  Patient has no acute fractures or dislocations.  Visualized soft tissues are unremarkable.  Patient has marked/severe osteoarthritis in the medial compartment of his left knee with bone-on-bone deformity and osteophytes present.  I am also able to review his right knee on the AP of the left knee which did also reveal severe osteoarthritis in the right knee in the medial compartment with bone-on-bone deformity and osteophytes present.      Assessment:       1. Primary osteoarthritis of left knee    2. Primary osteoarthritis of right knee      Plan:     Primary osteoarthritis of left knee  -     X-Ray Knee 3 View Left  -     Large Joint Aspiration/Injection: L knee    Primary osteoarthritis of right knee  -     Large Joint Aspiration/Injection: R knee        Follow up in about 3 months (around 11/5/2022) for B/l Knee Injection 08/05/22.    I injected his bilateral knees today via an anterior lateral approach with 40 mg of Kenalog and lidocaine respectively.  He tolerated this well.  Will have follow-up in 3 months to see how he is doing with these injections.  I did advise him that the definitive treatment recommendation for the severity of the osteoarthritis in his knees would be a total knee arthroplasty.  However, he has not really been given the opportunity to see how well/efficacious corticosteroids could work.  If he does get great relief, we can continue with corticosteroid injections several times a year if he so desires.        Bart Nolan, MPAS, PA-C    This note was created using [employer] voice recognition software that occasionally misinterprets words or phrases.

## 2022-08-05 NOTE — PROCEDURES
Large Joint Aspiration/Injection: R knee    Date/Time: 8/5/2022 10:00 AM  Performed by: Bart Nolan PA-C  Authorized by: Bart Nolan PA-C     Consent Done?:  Yes (Verbal)  Indications:  Pain  Site marked: the procedure site was marked    Timeout: prior to procedure the correct patient, procedure, and site was verified    Prep: patient was prepped and draped in usual sterile fashion      Local anesthesia used?: Yes    Local anesthetic:  Lidocaine 1% without epinephrine    Details:  Needle Size:  25 G  Ultrasonic Guidance for needle placement?: No    Location:  Knee  Site:  R knee  Medications:  40 mg triamcinolone acetonide 40 mg/mL  Patient tolerance:  Patient tolerated the procedure well with no immediate complications  Large Joint Aspiration/Injection: L knee    Date/Time: 8/5/2022 10:00 AM  Performed by: Bart Nolan PA-C  Authorized by: Bart Nolan PA-C     Consent Done?:  Yes (Verbal)  Indications:  Pain  Site marked: the procedure site was marked    Timeout: prior to procedure the correct patient, procedure, and site was verified    Prep: patient was prepped and draped in usual sterile fashion      Local anesthesia used?: Yes    Local anesthetic:  Lidocaine 1% without epinephrine    Details:  Needle Size:  25 G  Ultrasonic Guidance for needle placement?: No    Location:  Knee  Site:  L knee  Medications:  40 mg triamcinolone acetonide 40 mg/mL  Patient tolerance:  Patient tolerated the procedure well with no immediate complications

## 2022-11-08 ENCOUNTER — OFFICE VISIT (OUTPATIENT)
Dept: FAMILY MEDICINE | Facility: CLINIC | Age: 82
End: 2022-11-08
Payer: MEDICARE

## 2022-11-08 ENCOUNTER — LAB VISIT (OUTPATIENT)
Dept: LAB | Facility: HOSPITAL | Age: 82
End: 2022-11-08
Attending: STUDENT IN AN ORGANIZED HEALTH CARE EDUCATION/TRAINING PROGRAM
Payer: MEDICARE

## 2022-11-08 VITALS
HEIGHT: 63 IN | DIASTOLIC BLOOD PRESSURE: 82 MMHG | RESPIRATION RATE: 16 BRPM | WEIGHT: 160.94 LBS | BODY MASS INDEX: 28.52 KG/M2 | TEMPERATURE: 99 F | SYSTOLIC BLOOD PRESSURE: 134 MMHG | OXYGEN SATURATION: 94 % | HEART RATE: 90 BPM

## 2022-11-08 DIAGNOSIS — R51.9 CHRONIC NONINTRACTABLE HEADACHE, UNSPECIFIED HEADACHE TYPE: ICD-10-CM

## 2022-11-08 DIAGNOSIS — I10 ESSENTIAL HYPERTENSION: Primary | ICD-10-CM

## 2022-11-08 DIAGNOSIS — M17.0 PRIMARY OSTEOARTHRITIS OF BOTH KNEES: ICD-10-CM

## 2022-11-08 DIAGNOSIS — E78.2 MIXED HYPERLIPIDEMIA: ICD-10-CM

## 2022-11-08 DIAGNOSIS — G89.29 CHRONIC NONINTRACTABLE HEADACHE, UNSPECIFIED HEADACHE TYPE: ICD-10-CM

## 2022-11-08 DIAGNOSIS — G47.00 INSOMNIA, UNSPECIFIED TYPE: ICD-10-CM

## 2022-11-08 DIAGNOSIS — M26.629 TMJ SYNDROME: ICD-10-CM

## 2022-11-08 DIAGNOSIS — Z23 ENCOUNTER FOR IMMUNIZATION: ICD-10-CM

## 2022-11-08 DIAGNOSIS — I70.0 ATHEROSCLEROSIS OF AORTA: ICD-10-CM

## 2022-11-08 LAB
CHOLEST SERPL-MCNC: 163 MG/DL (ref 120–199)
CHOLEST/HDLC SERPL: 3.1 {RATIO} (ref 2–5)
ERYTHROCYTE [SEDIMENTATION RATE] IN BLOOD BY WESTERGREN METHOD: 7 MM/HR (ref 0–10)
HDLC SERPL-MCNC: 53 MG/DL (ref 40–75)
HDLC SERPL: 32.5 % (ref 20–50)
LDLC SERPL CALC-MCNC: 87.2 MG/DL (ref 63–159)
NONHDLC SERPL-MCNC: 110 MG/DL
TRIGL SERPL-MCNC: 114 MG/DL (ref 30–150)

## 2022-11-08 PROCEDURE — G0008 ADMIN INFLUENZA VIRUS VAC: HCPCS | Mod: S$GLB,,, | Performed by: STUDENT IN AN ORGANIZED HEALTH CARE EDUCATION/TRAINING PROGRAM

## 2022-11-08 PROCEDURE — 1126F AMNT PAIN NOTED NONE PRSNT: CPT | Mod: CPTII,S$GLB,, | Performed by: STUDENT IN AN ORGANIZED HEALTH CARE EDUCATION/TRAINING PROGRAM

## 2022-11-08 PROCEDURE — 80061 LIPID PANEL: CPT | Performed by: STUDENT IN AN ORGANIZED HEALTH CARE EDUCATION/TRAINING PROGRAM

## 2022-11-08 PROCEDURE — 99999 PR PBB SHADOW E&M-EST. PATIENT-LVL III: ICD-10-PCS | Mod: PBBFAC,,, | Performed by: STUDENT IN AN ORGANIZED HEALTH CARE EDUCATION/TRAINING PROGRAM

## 2022-11-08 PROCEDURE — 85651 RBC SED RATE NONAUTOMATED: CPT | Mod: PO | Performed by: STUDENT IN AN ORGANIZED HEALTH CARE EDUCATION/TRAINING PROGRAM

## 2022-11-08 PROCEDURE — 99214 OFFICE O/P EST MOD 30 MIN: CPT | Mod: 25,S$GLB,, | Performed by: STUDENT IN AN ORGANIZED HEALTH CARE EDUCATION/TRAINING PROGRAM

## 2022-11-08 PROCEDURE — 3075F PR MOST RECENT SYSTOLIC BLOOD PRESS GE 130-139MM HG: ICD-10-PCS | Mod: CPTII,S$GLB,, | Performed by: STUDENT IN AN ORGANIZED HEALTH CARE EDUCATION/TRAINING PROGRAM

## 2022-11-08 PROCEDURE — 1159F MED LIST DOCD IN RCRD: CPT | Mod: CPTII,S$GLB,, | Performed by: STUDENT IN AN ORGANIZED HEALTH CARE EDUCATION/TRAINING PROGRAM

## 2022-11-08 PROCEDURE — 1159F PR MEDICATION LIST DOCUMENTED IN MEDICAL RECORD: ICD-10-PCS | Mod: CPTII,S$GLB,, | Performed by: STUDENT IN AN ORGANIZED HEALTH CARE EDUCATION/TRAINING PROGRAM

## 2022-11-08 PROCEDURE — 90694 VACC AIIV4 NO PRSRV 0.5ML IM: CPT | Mod: S$GLB,,, | Performed by: STUDENT IN AN ORGANIZED HEALTH CARE EDUCATION/TRAINING PROGRAM

## 2022-11-08 PROCEDURE — 3075F SYST BP GE 130 - 139MM HG: CPT | Mod: CPTII,S$GLB,, | Performed by: STUDENT IN AN ORGANIZED HEALTH CARE EDUCATION/TRAINING PROGRAM

## 2022-11-08 PROCEDURE — 1126F PR PAIN SEVERITY QUANTIFIED, NO PAIN PRESENT: ICD-10-PCS | Mod: CPTII,S$GLB,, | Performed by: STUDENT IN AN ORGANIZED HEALTH CARE EDUCATION/TRAINING PROGRAM

## 2022-11-08 PROCEDURE — 99214 PR OFFICE/OUTPT VISIT, EST, LEVL IV, 30-39 MIN: ICD-10-PCS | Mod: 25,S$GLB,, | Performed by: STUDENT IN AN ORGANIZED HEALTH CARE EDUCATION/TRAINING PROGRAM

## 2022-11-08 PROCEDURE — 1101F PR PT FALLS ASSESS DOC 0-1 FALLS W/OUT INJ PAST YR: ICD-10-PCS | Mod: CPTII,S$GLB,, | Performed by: STUDENT IN AN ORGANIZED HEALTH CARE EDUCATION/TRAINING PROGRAM

## 2022-11-08 PROCEDURE — 99999 PR PBB SHADOW E&M-EST. PATIENT-LVL III: CPT | Mod: PBBFAC,,, | Performed by: STUDENT IN AN ORGANIZED HEALTH CARE EDUCATION/TRAINING PROGRAM

## 2022-11-08 PROCEDURE — 36415 COLL VENOUS BLD VENIPUNCTURE: CPT | Mod: PO | Performed by: STUDENT IN AN ORGANIZED HEALTH CARE EDUCATION/TRAINING PROGRAM

## 2022-11-08 PROCEDURE — 1101F PT FALLS ASSESS-DOCD LE1/YR: CPT | Mod: CPTII,S$GLB,, | Performed by: STUDENT IN AN ORGANIZED HEALTH CARE EDUCATION/TRAINING PROGRAM

## 2022-11-08 PROCEDURE — 3288F FALL RISK ASSESSMENT DOCD: CPT | Mod: CPTII,S$GLB,, | Performed by: STUDENT IN AN ORGANIZED HEALTH CARE EDUCATION/TRAINING PROGRAM

## 2022-11-08 PROCEDURE — 90694 FLU VACCINE - QUADRIVALENT - ADJUVANTED: ICD-10-PCS | Mod: S$GLB,,, | Performed by: STUDENT IN AN ORGANIZED HEALTH CARE EDUCATION/TRAINING PROGRAM

## 2022-11-08 PROCEDURE — 3288F PR FALLS RISK ASSESSMENT DOCUMENTED: ICD-10-PCS | Mod: CPTII,S$GLB,, | Performed by: STUDENT IN AN ORGANIZED HEALTH CARE EDUCATION/TRAINING PROGRAM

## 2022-11-08 PROCEDURE — 3079F DIAST BP 80-89 MM HG: CPT | Mod: CPTII,S$GLB,, | Performed by: STUDENT IN AN ORGANIZED HEALTH CARE EDUCATION/TRAINING PROGRAM

## 2022-11-08 PROCEDURE — 3079F PR MOST RECENT DIASTOLIC BLOOD PRESSURE 80-89 MM HG: ICD-10-PCS | Mod: CPTII,S$GLB,, | Performed by: STUDENT IN AN ORGANIZED HEALTH CARE EDUCATION/TRAINING PROGRAM

## 2022-11-08 PROCEDURE — G0008 FLU VACCINE - QUADRIVALENT - ADJUVANTED: ICD-10-PCS | Mod: S$GLB,,, | Performed by: STUDENT IN AN ORGANIZED HEALTH CARE EDUCATION/TRAINING PROGRAM

## 2022-11-08 RX ORDER — HYDROCODONE BITARTRATE AND ACETAMINOPHEN 7.5; 325 MG/1; MG/1
TABLET ORAL
Qty: 90 TABLET | Refills: 0 | Status: SHIPPED | OUTPATIENT
Start: 2022-11-08 | End: 2023-05-09 | Stop reason: SDUPTHER

## 2022-11-08 NOTE — PROGRESS NOTES
[facility] Note    Subjective:    The HPI and pertinent ROS is included in the Diagnostic Impression Remarks section at the end of the note. Please see below for further details. Chief complaint is at end of note.     Tha is a pleasant intelligent patient who is here for evaluation.     Modified Medications    Modified Medication Previous Medication    HYDROCODONE-ACETAMINOPHEN (NORCO) 7.5-325 MG PER TABLET HYDROcodone-acetaminophen (NORCO) 7.5-325 mg per tablet       1 tablet PO TID prn severe pain    1 tablet PO TID prn severe pain       Data reviewed 274}  Previous medical records reviewed and summarized in plan section at end of note.      If you are due for any health screening(s) below please notify me so we can arrange them to be ordered and scheduled. Most healthy patients at your age complete them, but you are free to accept or refuse. If you can't do it, I'll definitely understand. If you can, I'd certainly appreciate it!     Tests to Keep You Healthy    Colon Cancer Screening: DUE  Last Blood Pressure <= 139/89 (11/8/2022): Yes      The following portions of the patient's history were reviewed and updated as appropriate: allergies, current medications, past family history, past medical history, past social history, past surgical history and problem list.    He  has a past medical history of Arthritis, Elevated PSA, Essential hypertension, benign, Mixed hyperlipidemia, Prostatitis, unspecified, and Wears glasses.  He  has a past surgical history that includes Knee arthroscopy w/ meniscal repair (Left); Tonsillectomy; Hemorrhoid surgery; cataracts; and Colonoscopy (N/A, 8/14/2019).    He  reports that he quit smoking about 15 years ago. His smoking use included cigarettes. He has a 40.00 pack-year smoking history. He has been exposed to tobacco smoke. He has never used smokeless tobacco. He reports current alcohol use of about 5.0 standard drinks per week. He reports that he does not use  drugs.  He family history includes Cancer in his brother and paternal uncle.    Review of patient's allergies indicates:   Allergen Reactions    Aminoglycosides Other (See Comments)     Lips swelled    Meperidine Itching    Penicillins Itching       Tobacco Use: Medium Risk    Smoking Tobacco Use: Former    Smokeless Tobacco Use: Never    Passive Exposure: Past     Physical Examination  General appearance: alert, cooperative, no distress  Neck: no thyromegaly, no neck stiffness  Lungs: clear to auscultation, no wheezes, rales or rhonchi, symmetric air entry  Heart: normal rate, regular rhythm, normal S1, S2, no murmurs, rubs, clicks or gallops  Abdomen: soft, nontender, nondistended, no rigidity, rebound, or guarding.   Back: no point tenderness over spine  Extremities: peripheral pulses normal, no unilateral leg swelling or calf tenderness   Neurological:alert, oriented, normal speech, no new focal findings or movement disorder noted from baseline\  Neuro:               MS: alert, awake, oriented x3. Speech and thought process intact.  CN: PERRLA. Pupils constricted normally to light. Pupils constricted when looking at a near object, dilated when looking at a distant object, converged when my finger moved towards the nose.  Visual fields intact. EOMI. Sensation to light touch in all trigeminal divisions intact. Able to closed eyes tightly and smile without asymmetry. Hearing intact. No uvula deviation. 5/5 shoulder shrugs. Tongue able to protrude bilaterally without difficulty.   Motor: 5/5 all ext strength.  Sensation: to light touch intact throughout.   Coordination: Intact finger-to-nose, walked with normal gait across room.           BP Readings from Last 3 Encounters:   11/08/22 134/82   07/25/22 (!) 161/103   05/05/22 112/76     Wt Readings from Last 3 Encounters:   11/08/22 73 kg (160 lb 15 oz)   08/05/22 75.6 kg (166 lb 10.7 oz)   07/25/22 75.6 kg (166 lb 10.7 oz)     /82 (BP Location: Left arm,  "Patient Position: Sitting, BP Method: Medium (Manual))   Pulse 90   Temp 99 °F (37.2 °C) (Oral)   Resp 16   Ht 5' 3" (1.6 m)   Wt 73 kg (160 lb 15 oz)   SpO2 (!) 94%   BMI 28.51 kg/m²    274}  Laboratory: I have reviewed old labs below:    274}    Lab Results   Component Value Date    WBC 7.93 05/05/2022    HGB 14.4 05/05/2022    HCT 43.7 05/05/2022    MCV 91 05/05/2022     05/05/2022     05/05/2022    K 3.8 05/05/2022     05/05/2022    CALCIUM 9.3 05/05/2022    CO2 24 05/05/2022    GLU 82 05/05/2022    BUN 20 05/05/2022    CREATININE 0.8 05/05/2022    ANIONGAP 11 05/05/2022    PROT 6.5 05/05/2022    ALBUMIN 3.8 05/05/2022    BILITOT 0.9 05/05/2022    ALKPHOS 42 (L) 05/05/2022    ALT 14 05/05/2022    AST 18 05/05/2022    INR 1.0 04/06/2016    CHOL 150 11/08/2021    TRIG 74 11/08/2021    HDL 59 11/08/2021    LDLCALC 76.2 11/08/2021    TSH 1.255 10/04/2018    PSA 3.69 06/11/2013    HGBA1C 5.2 05/05/2022     Lab reviewed by me: Particular labs of significance that I will monitor, workup, or treat to improve are mentioned below in diagnostic impression remarks.      Imaging/EKG: I have reviewed the pertinent results and my findings are noted in remarks.  274}    CC:   Chief Complaint   Patient presents with    Headache    Hypertension        274}    Assessment/Plan  Tha Guzman is a 82 y.o. male who presents to clinic with:  1. Essential hypertension    2. Mixed hyperlipidemia    3. Insomnia, unspecified type    4. Primary osteoarthritis of both knees    5. Atherosclerosis of aorta    6. Chronic nonintractable headache, unspecified headache type    7. TMJ syndrome    8. Encounter for immunization       274}  Diagnostic Impression Remarks + HPI     Documentation entered by me for this encounter may have been done in part using speech-recognition technology. Although I have made an effort to ensure accuracy, "sound like" errors may exist and should be interpreted in context. "     Hypertension-control uncertain he reports his systolic blood pressure is 150s over 80s at home but his blood pressure is lower today    Atherosclerosis of aorta- stable continue statin and rec healthy diet monitor follow lipid levels     Insomnia well controlled trazodone continue    Osteoarthritis-stable recommend topical Voltaren cream try to limit Norco use    TMJ syndrome-patient reports some pain with palpation of the TMJ and improved with Voltaren cream has some clicking is using mouth guard went over some exercises continue topical NSAIDs p.r.n. no diaphoresis nausea vomiting no chest pain or shortness of breath.  Recommend see ENT he does not want to.  No pain with chewing    Headache-patient reports he has had headaches for years have been the morning consider sleep study He denies unilateral weakness, slurred speech, facial droop, and new onset numbness. No fever, neck stiffness, or neck pain. He does not endorse seizures, confusion, morning vomiting, or weight loss. No jaw claudication, vision changes, temporal tenderness, or eye pain. He denies sudden onset, maximum intensity of pain at onset, and head trauma.     Hyperlipidemia well controlled continue statin    I have very low suspicion for alternative causes of headache such as intracranial hemorrhage, ischemic process, meningitis, temporal arteritis, or mass. I do not suspect an intracranial hemorrhage since he does not endorse a sudden onset severe headache, thunderclap headache, or worse headache of his life. An ischemic process is low on the differential since he has no new unilateral weakness, slurred speech, facial droop, or new onset numbness. Meningitis is unlikely since he denies fever, photophobia, confusion, and no neck stiffness was appreciate on his exam. My suspicion for temporal arteritis is low since he has no jaw claudication, unilateral vision loss, temporal tenderness, or eye pain. I also do not suspect a mass since he denies  seizures, confusion, morning vomiting, and unintentional weight loss. I do not suspect a carotid or vertebral artery dissection since the pain does not radiate to the neck and I did not appreciate Dru syndrome. Glaucoma is low on my differential since there is no eye redness or tearing. The patient has a non-focal neurological examination with history and I do not think further brain imaging or lumbar puncture is indicated at this time.     The patient is meeting the goals of opioid therapy and is dependent on them for functionality and can not perform ADLS without them. Regarding opioids, the risks were discussed including tolerance, addiction, overdose, over-sedation, drug interactions, liver damage, hormone imbalance, dental decay, opioid-induced hyperalgesia, and even death. I cautioned the patient that the medications that are being taken are dangerous and can result in death from overdose. They can also result in death if taken by someone else without medical supervision. I also warned the patient to not drive or operate heavy machinery while taking these medications. I discussed the use of narcan if needed and he verbalized understanding. The patient agreed to teach a family member living in the same home on how and when to use it if the scenario of a drug overdose occurs. Shared decision making occurred and he agreed with this treatment plan.     I discussed with him the proper use of narcotics, including storing the narcotics in a safe and locked place and promptly disposing any unused narcotics once he no longer needs them. We specifically discussed that he is never to give any medication, and especially narcotic pain medication, to another person and they are only for him.  We talked about the risks including respiratory depression and about Naloxone as an antidote for narcotic overdose. We discussed that he is to avoid alcohol, benzodiazapenes, and sedating drugs while taking narcotics.    We  specifically discussed non-opioid alternatives and patient offered lower dose of opioid medication.    He has no suicidal thoughts or intent and he has no weapons access and he is future oriented with support in the community and I emphasized that if he ever develops thoughts of hurting he to directly go to the emergency department which he agreed to and he is willing to have close follow up with me.     was reviewed today (11/08/2022) and determined to be appropriate for continued opioid therapy.    The addictive potential of the medications were discussed and he agrees to inform us or the PCP if any compulsion to taking his medications for any reason other than for pain arises. He agrees to safe-guard all medications from unintentional or intentional use or misuse by other persons who may potentially have access to their premises, including but not limited to significant others, children, parents, friends, and even strangers. He takes sole responsibility for any consequences that may occur from not doing so.    This is the extent of this pleasant patient's concerns at this present time. He did not feel chest pain upon exertion, dyspnea, nausea, vomiting, diaphoresis, or syncope. No pleuritic chest pain, unilateral leg swelling, calf tenderness, or calf pain. Negative for unintentional weight loss night sweats and fevers. Tha will return to clinic in a few months for further workup and reassessment or sooner as needed. He was instructed to call the clinic or go to the emergency department or urgent care immediately if his symptoms do not improve, worsens, or if any new symptoms develop. As we discussed that symptoms could worsen over the next 24 hours he was advised that if any increased swelling, pain, or numbness arise to go immediately to the ED. Patient knows to call any time if an emergency arises. Shared decision making occurred and he verbalized understanding in agreement with this plan. I discussed  imaging findings, diagnosis, possibilities, treatment options, medications, risks, and benefits. He had many questions regarding the options and long-term effects. All questions were answered. He expressed understanding after counseling regarding the diagnosis and recommendations. He was capable and demonstrated competence with understanding of these options. Shared decision making was performed resulting in him choosing the current treatment plan. Patient handout was given with instructions and recommendations. Advised the patient that if they become pregnant to alert us immediately to assess for medication changes. I also discussed the importance of close follow up to discuss labs, change or modify his medications if needed, monitor side effects, and further evaluation of medical problems.     Additional workup planned: see labs ordered below.    See below for labs and meds ordered with associated diagnosis      1. Essential hypertension  - Hypertension Digital Medicine (HDMP) Enrollment Order  - Hypertension [employer] [facility] ([employer]): Assign Onboarding Questionnaires    2. Mixed hyperlipidemia  - Lipid Panel; Future    3. Insomnia, unspecified type    4. Primary osteoarthritis of both knees  - HYDROcodone-acetaminophen (NORCO) 7.5-325 mg per tablet; 1 tablet PO TID prn severe pain  Dispense: 90 tablet; Refill: 0    5. Atherosclerosis of aorta    6. Chronic nonintractable headache, unspecified headache type  - Sedimentation rate; Future    7. TMJ syndrome  - Sedimentation rate; Future    8. Encounter for immunization  - Influenza (FLUAD) - Quadrivalent (Adjuvanted) *Preferred* (65+) (PF)    Bart Burrows MD   274}    If you are due for any health screening(s) below please notify me so we can arrange them to be ordered and scheduled. Most healthy patients at your age complete them, but you are free to accept or refuse.     If you can't do it, I'll definitely understand. If you can, I'd certainly appreciate it!    Tests to Keep You Healthy    Colon Cancer Screening: DUE  Last Blood Pressure <= 139/89 (11/8/2022): Yes

## 2022-11-08 NOTE — PROGRESS NOTES
Two Patient identifiers used, Name and . VIS information given to patient and procedure was explained. Patient verbalized understanding. FluAD HD was administered IM in the right  deltoid using sterile technique.  Procedure was tolerated well and no residual bleeding noted.  Patient was advised to wait in office for 15 minutes post injection in case of adverse reaction. Understanding was verbalized

## 2022-11-11 ENCOUNTER — OFFICE VISIT (OUTPATIENT)
Dept: ORTHOPEDICS | Facility: CLINIC | Age: 82
End: 2022-11-11
Payer: MEDICARE

## 2022-11-11 ENCOUNTER — PATIENT MESSAGE (OUTPATIENT)
Dept: ORTHOPEDICS | Facility: CLINIC | Age: 82
End: 2022-11-11
Payer: MEDICARE

## 2022-11-11 VITALS
SYSTOLIC BLOOD PRESSURE: 164 MMHG | DIASTOLIC BLOOD PRESSURE: 98 MMHG | WEIGHT: 160 LBS | HEIGHT: 63 IN | BODY MASS INDEX: 28.35 KG/M2

## 2022-11-11 DIAGNOSIS — M17.12 PRIMARY OSTEOARTHRITIS OF LEFT KNEE: Primary | ICD-10-CM

## 2022-11-11 DIAGNOSIS — M17.11 PRIMARY OSTEOARTHRITIS OF RIGHT KNEE: ICD-10-CM

## 2022-11-11 DIAGNOSIS — Z01.818 PRE-OP TESTING: ICD-10-CM

## 2022-11-11 PROCEDURE — 99213 PR OFFICE/OUTPT VISIT, EST, LEVL III, 20-29 MIN: ICD-10-PCS | Mod: S$GLB,,, | Performed by: PHYSICIAN ASSISTANT

## 2022-11-11 PROCEDURE — 1125F AMNT PAIN NOTED PAIN PRSNT: CPT | Mod: CPTII,S$GLB,, | Performed by: PHYSICIAN ASSISTANT

## 2022-11-11 PROCEDURE — 3288F FALL RISK ASSESSMENT DOCD: CPT | Mod: CPTII,S$GLB,, | Performed by: PHYSICIAN ASSISTANT

## 2022-11-11 PROCEDURE — 3288F PR FALLS RISK ASSESSMENT DOCUMENTED: ICD-10-PCS | Mod: CPTII,S$GLB,, | Performed by: PHYSICIAN ASSISTANT

## 2022-11-11 PROCEDURE — 1100F PTFALLS ASSESS-DOCD GE2>/YR: CPT | Mod: CPTII,S$GLB,, | Performed by: PHYSICIAN ASSISTANT

## 2022-11-11 PROCEDURE — 1160F RVW MEDS BY RX/DR IN RCRD: CPT | Mod: CPTII,S$GLB,, | Performed by: PHYSICIAN ASSISTANT

## 2022-11-11 PROCEDURE — 1159F PR MEDICATION LIST DOCUMENTED IN MEDICAL RECORD: ICD-10-PCS | Mod: CPTII,S$GLB,, | Performed by: PHYSICIAN ASSISTANT

## 2022-11-11 PROCEDURE — 3077F SYST BP >= 140 MM HG: CPT | Mod: CPTII,S$GLB,, | Performed by: PHYSICIAN ASSISTANT

## 2022-11-11 PROCEDURE — 1100F PR PT FALLS ASSESS DOC 2+ FALLS/FALL W/INJURY/YR: ICD-10-PCS | Mod: CPTII,S$GLB,, | Performed by: PHYSICIAN ASSISTANT

## 2022-11-11 PROCEDURE — 3077F PR MOST RECENT SYSTOLIC BLOOD PRESSURE >= 140 MM HG: ICD-10-PCS | Mod: CPTII,S$GLB,, | Performed by: PHYSICIAN ASSISTANT

## 2022-11-11 PROCEDURE — 1160F PR REVIEW ALL MEDS BY PRESCRIBER/CLIN PHARMACIST DOCUMENTED: ICD-10-PCS | Mod: CPTII,S$GLB,, | Performed by: PHYSICIAN ASSISTANT

## 2022-11-11 PROCEDURE — 3080F DIAST BP >= 90 MM HG: CPT | Mod: CPTII,S$GLB,, | Performed by: PHYSICIAN ASSISTANT

## 2022-11-11 PROCEDURE — 3080F PR MOST RECENT DIASTOLIC BLOOD PRESSURE >= 90 MM HG: ICD-10-PCS | Mod: CPTII,S$GLB,, | Performed by: PHYSICIAN ASSISTANT

## 2022-11-11 PROCEDURE — 1125F PR PAIN SEVERITY QUANTIFIED, PAIN PRESENT: ICD-10-PCS | Mod: CPTII,S$GLB,, | Performed by: PHYSICIAN ASSISTANT

## 2022-11-11 PROCEDURE — 1159F MED LIST DOCD IN RCRD: CPT | Mod: CPTII,S$GLB,, | Performed by: PHYSICIAN ASSISTANT

## 2022-11-11 PROCEDURE — 99213 OFFICE O/P EST LOW 20 MIN: CPT | Mod: S$GLB,,, | Performed by: PHYSICIAN ASSISTANT

## 2022-11-11 NOTE — PROGRESS NOTES
[facility] ORTHOPEDICS  [address]  [place] [address]  Phone: [phone]   [phone]    Patient's PCP: Bart Burrows MD  Referring Provider: No ref. provider found    Subjective:      Chief Complaint:   Chief Complaint   Patient presents with    Right Knee - Pain     Patient is here to f/up on Bilateral knee injections 8/5/22, injections helped a little bit for 3-4 days. Wants to discuss options    Left Knee - Pain       Past Medical History:   Diagnosis Date    Arthritis     Elevated PSA     Essential hypertension, benign     Mixed hyperlipidemia     Prostatitis, unspecified     Wears glasses        Past Surgical History:   Procedure Laterality Date    cataracts      COLONOSCOPY N/A 8/14/2019    Procedure: COLONOSCOPY;  Surgeon: Dominick Sr MD;  Location: [facility];  Service: Endoscopy;  Laterality: N/A;    HEMORRHOID SURGERY      KNEE ARTHROSCOPY W/ MENISCAL REPAIR Left     TONSILLECTOMY         Current Outpatient Medications   Medication Sig    alfuzosin (UROXATRAL) 10 mg Tb24 TAKE 1 TABLET (10 MG TOTAL) BY MOUTH DAILY WITH BREAKFAST.    amlodipine-benazepril 5-20 mg (LOTREL) 5-20 mg per capsule TAKE 1 CAPSULE BY MOUTH EVERY DAY    aspirin (ECOTRIN) 81 MG EC tablet Take 81 mg by mouth once daily.    HYDROcodone-acetaminophen (NORCO) 7.5-325 mg per tablet 1 tablet PO TID prn severe pain    hydrocortisone (ANUSOL-HC) 25 mg suppository     simvastatin (ZOCOR) 20 MG tablet Take 1 tablet (20 mg total) by mouth every evening.    traZODone (DESYREL) 50 MG tablet Take 1 tablet (50 mg total) by mouth nightly as needed for Insomnia.     No current facility-administered medications for this visit.       Review of patient's allergies indicates:   Allergen Reactions    Aminoglycosides Other (See Comments)     Lips swelled    Meperidine Itching    Penicillins Itching       Family History   Problem Relation Age of Onset    Cancer Brother         lunga cancer    Cancer Paternal Uncle          prostate cancer    Melanoma Neg Hx     Psoriasis Neg Hx     Lupus Neg Hx     Eczema Neg Hx        Social History     Socioeconomic History    Marital status:    Tobacco Use    Smoking status: Former     Packs/day: 1.00     Years: 40.00     Pack years: 40.00     Types: Cigarettes     Quit date: 7/18/2007     Years since quitting: 15.3     Passive exposure: Past    Smokeless tobacco: Never   Substance and Sexual Activity    Alcohol use: Yes     Alcohol/week: 5.0 standard drinks     Types: 6 Standard drinks or equivalent per week     Comment: OCCASSIONALLY    Drug use: No    Sexual activity: Not Currently     Social Determinants of Health     Financial Resource Strain: Low Risk     Difficulty of Paying Living Expenses: Not hard at all   Food Insecurity: No Food Insecurity    Worried About Running Out of Food in the Last Year: Never true    Ran Out of Food in the Last Year: Never true   Transportation Needs: No Transportation Needs    Lack of Transportation (Medical): No    Lack of Transportation (Non-Medical): No   Physical Activity: Insufficiently Active    Days of Exercise per Week: 1 day    Minutes of Exercise per Session: 40 min   Stress: Stress Concern Present    Feeling of Stress : To some extent   Social Connections: Unknown    Frequency of Communication with Friends and Family: More than three times a week    Frequency of Social Gatherings with Friends and Family: Once a week    Active Member of Clubs or Organizations: No    Attends Club or Organization Meetings: Never    Marital Status:    Housing Stability: Low Risk     Unable to Pay for Housing in the Last Year: No    Number of Places Lived in the Last Year: 1    Unstable Housing in the Last Year: No       History of present illness:  Patient comes in today for follow-up for his bilateral knees.  He was last seen and injected in his bilateral knees 3 months ago with very little relief.  It lasted for approximately 2 days he reports.  He has  pain on a daily basis in his bilateral knees.  Reports left greater than right knee pain.  He has tried corticosteroid injections, NSAIDs, ice, and activity modification without much relief.  He is interested in more definitive treatment.    Review of Systems:    Constitutional: Negative for chills, fever and weight loss.   HENT: Negative for congestion.    Eyes: Negative for discharge and redness.   Respiratory: Negative for cough and shortness of breath.    Cardiovascular: Negative for chest pain.   Gastrointestinal: Negative for nausea and vomiting.   Musculoskeletal: See HPI.   Skin: Negative for rash.   Neurological: Negative for headaches.   Endo/Heme/Allergies: Does not bruise/bleed easily.   Psychiatric/Behavioral: The patient is not nervous/anxious.    All other systems reviewed and are negative.       Objective:      Physical Examination:    Vital Signs:    Vitals:    11/11/22 0831   BP: (!) 164/98       Body mass index is 28.34 kg/m².    This a well-developed, well nourished patient in no acute distress.  They are alert and oriented and cooperative to examination.     Left knee exam:  His left knee exam is similar to where he was on his last visit approximately 3 months ago. Skin to his left knee is clean dry and intact.  There is no erythema or ecchymosis.  There are no signs or symptoms of infection.  Patient is neurovascularly intact throughout his left lower extremity.  Left calf is soft and nontender.  Patient has a varus deformity to his bilateral knees. Left knee range of motion is approximately 3-100 degrees.  Left knee he is stable to varus and valgus stresses while held in extension.  Patient does have diffuse crepitus with range of motioning.  He has tender to palpation diffusely about the medial aspect of his left knee.  He has a negative straight leg raise bilaterally.  He is able weight bear as tolerated on his bilateral lower extremities but has an antalgic gait.  He has well-preserved  internal/external rotation of bilateral hips without pain.    Pertinent New Results:        XRAY Report / Interpretation:   No new radiographs were taken on today's clinic visit.      Assessment:       1. Primary osteoarthritis of left knee    2. Primary osteoarthritis of right knee      Plan:     Primary osteoarthritis of left knee  -     CT Knee w/o Contrast Left w/Antoine Protocol; Future; Expected date: 11/11/2022    Primary osteoarthritis of right knee      Follow up for F/U .    Risks and benefits of proceeding with left total knee arthroplasty were discussed with the patient today in detail.  We specifically discussed the risk of DVT and infection and the measures we used to reduce these risks.  We discussed the use of the Antoine robot system.  We discussed the outpatient nature of the procedure.  All of his questions were answered.  He clearly understood and wished to proceed.  Surgical consents were obtained today.    Risks of the procedure were reviewed with the patient.  This includes, but is not limited to:  infection, bleeding, pain, swelling, decreased range of motion, nerve injury/damage, numbness, leg length discrepancy, wound dehiscence, hardware failure, deep vein thrombosis, pulmonary embolism, reflex sympathetic dystrophy, and possible need for further surgery.        EVELYN Beaulieu, PA-C    This note was created using [employer] voice recognition software that occasionally misinterprets words or phrases.

## 2022-11-14 ENCOUNTER — PATIENT MESSAGE (OUTPATIENT)
Dept: ORTHOPEDICS | Facility: CLINIC | Age: 82
End: 2022-11-14

## 2022-11-16 ENCOUNTER — TELEPHONE (OUTPATIENT)
Dept: FAMILY MEDICINE | Facility: CLINIC | Age: 82
End: 2022-11-16

## 2022-11-16 ENCOUNTER — TELEPHONE (OUTPATIENT)
Dept: FAMILY MEDICINE | Facility: CLINIC | Age: 82
End: 2022-11-16
Payer: MEDICARE

## 2022-11-16 ENCOUNTER — CLINICAL SUPPORT (OUTPATIENT)
Dept: FAMILY MEDICINE | Facility: CLINIC | Age: 82
End: 2022-11-16
Payer: MEDICARE

## 2022-11-16 VITALS — DIASTOLIC BLOOD PRESSURE: 80 MMHG | SYSTOLIC BLOOD PRESSURE: 118 MMHG

## 2022-11-16 DIAGNOSIS — R51.9 NONINTRACTABLE HEADACHE, UNSPECIFIED CHRONICITY PATTERN, UNSPECIFIED HEADACHE TYPE: Primary | ICD-10-CM

## 2022-11-16 DIAGNOSIS — Z01.30 BP CHECK: Primary | ICD-10-CM

## 2022-11-16 PROCEDURE — 99999 PR PBB SHADOW E&M-EST. PATIENT-LVL I: ICD-10-PCS | Mod: PBBFAC,,,

## 2022-11-16 PROCEDURE — 99999 PR PBB SHADOW E&M-EST. PATIENT-LVL I: CPT | Mod: PBBFAC,,,

## 2022-11-16 NOTE — TELEPHONE ENCOUNTER
"Tha Guzman 82 y.o. male is here today for Blood Pressure check.   History of HTN yes.    Review of patient's allergies indicates:   Allergen Reactions    Aminoglycosides Other (See Comments)     Lips swelled    Meperidine Itching    Penicillins Itching     Creatinine   Date Value Ref Range Status   05/05/2022 0.8 0.5 - 1.4 mg/dL Final     Sodium   Date Value Ref Range Status   05/05/2022 139 136 - 145 mmol/L Final     Potassium   Date Value Ref Range Status   05/05/2022 3.8 3.5 - 5.1 mmol/L Final   ]  Patient verifies taking blood pressure medications on a regular basis at the same time of the day.     Current Outpatient Medications:     alfuzosin (UROXATRAL) 10 mg Tb24, TAKE 1 TABLET (10 MG TOTAL) BY MOUTH DAILY WITH BREAKFAST., Disp: 90 tablet, Rfl: 3    amlodipine-benazepril 5-20 mg (LOTREL) 5-20 mg per capsule, TAKE 1 CAPSULE BY MOUTH EVERY DAY, Disp: 90 capsule, Rfl: 3    aspirin (ECOTRIN) 81 MG EC tablet, Take 81 mg by mouth once daily., Disp: , Rfl:     HYDROcodone-acetaminophen (NORCO) 7.5-325 mg per tablet, 1 tablet PO TID prn severe pain, Disp: 90 tablet, Rfl: 0    hydrocortisone (ANUSOL-HC) 25 mg suppository, , Disp: , Rfl:     simvastatin (ZOCOR) 20 MG tablet, Take 1 tablet (20 mg total) by mouth every evening., Disp: 90 tablet, Rfl: 3    traZODone (DESYREL) 50 MG tablet, Take 1 tablet (50 mg total) by mouth nightly as needed for Insomnia., Disp: 90 tablet, Rfl: 1    Does patient have record of home blood pressure readings yes. Readings have been averaging 140-160's/70's-90's,  However after reviewing the document "7 Simple Steps to get an Accurate Blood Pressure, pt acknowledged that he  Was fastening the BP cuff too tight, was not appropriately relaxing or making sure his bladder was empty  His  upper arm, home BP monitor was verified today.   Last dose of blood pressure medication was taken at 5:00 am this morning.  Patient is symptomatic, c/o almost daily headaches at the base of his neck " and  Sometimes behind his eyeballs, and states not sure if related to his BP or not.  States he thinks it might be that he needs to get a new pillow.  Pt. Asking if he should see someone for headache and neck complaints?  Initial BP today was 122/78  Blood pressure reading after 15 minutes was/118/80, Pulse 80.  5/9/23 6 mo elle Burrows notified.

## 2022-11-16 NOTE — TELEPHONE ENCOUNTER
Notified pt. That per Dr. Burrows, his blood pressure looks good and that with regards to his complaints of headaches and neck pain, he is referring him to Neurology. Patient states he declines referral at this time.

## 2022-12-01 NOTE — PROGRESS NOTES
[facility]    Subjective:      Patient: Tha Guzman Patient PCP: Bart Burrows MD         :  1940     Sex:  male      MRN:  [id number]          Date of Visit: 2022      Chief Complaint: Bilateral aural fullness     Patient ID: Tha Guzman is a 82 y.o. male who presents to clinic for evaluation of ear pain. Pt seen by family medicine 2022 with reports of left ear pain and left jaw pain. Pt had left TMJ pain with pressure at family medicine visit with Dr. Burrows 2022 and was advised to start anti-inflammatories. Pt states that he has had bilateral left greater than right ear pressure. Pt saw family medicine 6 months ago and was advised to take benadryl at night, oral steroids, and loratadine which has not helped with his ear pressure. Pt states that he has been using voltaren on his TMJs as advised by Dr. Burrows and states that this has helped with ear fullenss. Pt states that he has had issues with hearing in environments with background sounds. Pt denies green/yellow ear drainage, ear pain, tinnitus or vertigo. Pt wears a  at night to help with grinding his teeth at night.     Prior ear surgery: Pt denies.   History of loud noise exposure: Pt denies.   Audiogram in the past 12 months: Pt has not had an audiogram as and adult.     Past Medical History  He has a past medical history of Arthritis, Elevated PSA, Essential hypertension, benign, Mixed hyperlipidemia, Prostatitis, unspecified, and Wears glasses.    Family History  His family history includes Cancer in his brother and paternal uncle.    Past Surgical History:   Procedure Laterality Date    cataracts      COLONOSCOPY N/A 2019    Procedure: COLONOSCOPY;  Surgeon: Dominick Sr MD;  Location: [facility];  Service: Endoscopy;  Laterality: N/A;    HEMORRHOID SURGERY      KNEE ARTHROSCOPY W/ MENISCAL REPAIR Left     TONSILLECTOMY       Social History     Tobacco Use    Smoking status: Former      Packs/day: 1.00     Years: 40.00     Pack years: 40.00     Types: Cigarettes     Quit date: 7/18/2007     Years since quitting: 15.3     Passive exposure: Past    Smokeless tobacco: Never   Substance and Sexual Activity    Alcohol use: Yes     Alcohol/week: 5.0 standard drinks     Types: 6 Standard drinks or equivalent per week     Comment: OCCASSIONALLY    Drug use: No    Sexual activity: Not Currently     Medications  He has a current medication list which includes the following prescription(s): alfuzosin, amlodipine-benazepril 5-20 mg, aspirin, hydrocodone-acetaminophen, hydrocortisone, simvastatin, and trazodone.    Review of patient's allergies indicates:   Allergen Reactions    Aminoglycosides Other (See Comments)     Lips swelled    Meperidine Itching    Penicillins Itching     All medications, allergies, and past history have been reviewed.    Objective:      Vitals:  Vitals - 1 value per visit 11/16/2022 12/2/2022 12/2/2022   SYSTOLIC 118 - -   DIASTOLIC 80 - -   Pulse - - -   Temp - - -   Resp - - 16   SPO2 - - -   Weight (lb) - - 161.6   Weight (kg) - - 73.3   Height - - 63   BMI (Calculated) - - 28.6   VISIT REPORT - - -   Pain Score  - 0 -   Some recent data might be hidden       Body surface area is 1.81 meters squared.    Physical Exam  Constitutional:       General: He is not in acute distress.     Appearance: Normal appearance. He is not ill-appearing.   HENT:      Head: Normocephalic and atraumatic.      Right Ear: Tympanic membrane, ear canal and external ear normal.      Left Ear: Tympanic membrane, ear canal and external ear normal.      Nose: Nose normal.      Mouth/Throat:      Lips: Pink. No lesions.      Mouth: Mucous membranes are moist. No oral lesions.      Tongue: No lesions.      Palate: No lesions.      Pharynx: Oropharynx is clear. Uvula midline. No pharyngeal swelling, oropharyngeal exudate, posterior oropharyngeal erythema or uvula swelling.      Comments: Noted popping of TMJ left  greater than right with pressure to TMJs.  Eyes:      General:         Right eye: No discharge.         Left eye: No discharge.      Extraocular Movements: Extraocular movements intact.      Conjunctiva/sclera: Conjunctivae normal.   Pulmonary:      Effort: Pulmonary effort is normal.   Neurological:      General: No focal deficit present.      Mental Status: He is alert and oriented to person, place, and time. Mental status is at baseline.   Psychiatric:         Mood and Affect: Mood normal.         Behavior: Behavior normal.         Thought Content: Thought content normal.         Judgment: Judgment normal.     Labs:  WBC   Date Value Ref Range Status   05/05/2022 7.93 3.90 - 12.70 K/uL Final     Platelets   Date Value Ref Range Status   05/05/2022 235 150 - 450 K/uL Final     Creatinine   Date Value Ref Range Status   05/05/2022 0.8 0.5 - 1.4 mg/dL Final     TSH   Date Value Ref Range Status   10/04/2018 1.255 0.400 - 4.000 uIU/mL Final     Glucose   Date Value Ref Range Status   05/05/2022 82 70 - 110 mg/dL Final     Hemoglobin A1C   Date Value Ref Range Status   05/05/2022 5.2 4.0 - 5.6 % Final     Comment:     ADA Screening Guidelines:  5.7-6.4%  Consistent with prediabetes  >or=6.5%  Consistent with diabetes    High levels of fetal hemoglobin interfere with the HbA1C  assay. Heterozygous hemoglobin variants (HbS, HgC, etc)do  not significantly interfere with this assay.   However, presence of multiple variants may affect accuracy.       All lab results and imaging results have been reviewed.    Assessment:        ICD-10-CM ICD-9-CM   1. Sensation of fullness in both ears  H93.8X3 388.8   2. TMJ syndrome  M26.629 524.69   3. Bilateral hearing loss, unspecified hearing loss type  H91.93 389.9              Plan:      Pt wears a  at night to help with grinding his teeth at night. Pt states that he has been using voltaren on his TMJs as advised by Dr. Burrows and states that this has helped with ear  ellen. Pt advised about TMJ inflammation and that it can cause referred ear pain or fullness. Pt given TMJ syndrome exercise handout today in office. Pt is to continue wearing  at night. Pt states that he has had issues with hearing in environments with background sounds. Pt is to have comprehensive audiogram and has been added to wait list for when our in office audiologist starts 01/2023.

## 2022-12-02 ENCOUNTER — OFFICE VISIT (OUTPATIENT)
Dept: OTOLARYNGOLOGY | Facility: CLINIC | Age: 82
End: 2022-12-02
Payer: MEDICARE

## 2022-12-02 VITALS — BODY MASS INDEX: 28.64 KG/M2 | WEIGHT: 161.63 LBS | RESPIRATION RATE: 16 BRPM | HEIGHT: 63 IN

## 2022-12-02 DIAGNOSIS — M26.629 TMJ SYNDROME: ICD-10-CM

## 2022-12-02 DIAGNOSIS — H91.93 BILATERAL HEARING LOSS, UNSPECIFIED HEARING LOSS TYPE: ICD-10-CM

## 2022-12-02 DIAGNOSIS — H93.8X3 SENSATION OF FULLNESS IN BOTH EARS: Primary | ICD-10-CM

## 2022-12-02 PROCEDURE — 1126F AMNT PAIN NOTED NONE PRSNT: CPT | Mod: CPTII,S$GLB,, | Performed by: PHYSICIAN ASSISTANT

## 2022-12-02 PROCEDURE — 3288F PR FALLS RISK ASSESSMENT DOCUMENTED: ICD-10-PCS | Mod: CPTII,S$GLB,, | Performed by: PHYSICIAN ASSISTANT

## 2022-12-02 PROCEDURE — 1159F PR MEDICATION LIST DOCUMENTED IN MEDICAL RECORD: ICD-10-PCS | Mod: CPTII,S$GLB,, | Performed by: PHYSICIAN ASSISTANT

## 2022-12-02 PROCEDURE — 99999 PR PBB SHADOW E&M-EST. PATIENT-LVL III: ICD-10-PCS | Mod: PBBFAC,,, | Performed by: PHYSICIAN ASSISTANT

## 2022-12-02 PROCEDURE — 1159F MED LIST DOCD IN RCRD: CPT | Mod: CPTII,S$GLB,, | Performed by: PHYSICIAN ASSISTANT

## 2022-12-02 PROCEDURE — 1126F PR PAIN SEVERITY QUANTIFIED, NO PAIN PRESENT: ICD-10-PCS | Mod: CPTII,S$GLB,, | Performed by: PHYSICIAN ASSISTANT

## 2022-12-02 PROCEDURE — 99213 OFFICE O/P EST LOW 20 MIN: CPT | Mod: S$GLB,,, | Performed by: PHYSICIAN ASSISTANT

## 2022-12-02 PROCEDURE — 1100F PR PT FALLS ASSESS DOC 2+ FALLS/FALL W/INJURY/YR: ICD-10-PCS | Mod: CPTII,S$GLB,, | Performed by: PHYSICIAN ASSISTANT

## 2022-12-02 PROCEDURE — 3288F FALL RISK ASSESSMENT DOCD: CPT | Mod: CPTII,S$GLB,, | Performed by: PHYSICIAN ASSISTANT

## 2022-12-02 PROCEDURE — 1160F RVW MEDS BY RX/DR IN RCRD: CPT | Mod: CPTII,S$GLB,, | Performed by: PHYSICIAN ASSISTANT

## 2022-12-02 PROCEDURE — 1100F PTFALLS ASSESS-DOCD GE2>/YR: CPT | Mod: CPTII,S$GLB,, | Performed by: PHYSICIAN ASSISTANT

## 2022-12-02 PROCEDURE — 99999 PR PBB SHADOW E&M-EST. PATIENT-LVL III: CPT | Mod: PBBFAC,,, | Performed by: PHYSICIAN ASSISTANT

## 2022-12-02 PROCEDURE — 99213 PR OFFICE/OUTPT VISIT, EST, LEVL III, 20-29 MIN: ICD-10-PCS | Mod: S$GLB,,, | Performed by: PHYSICIAN ASSISTANT

## 2022-12-02 PROCEDURE — 1160F PR REVIEW ALL MEDS BY PRESCRIBER/CLIN PHARMACIST DOCUMENTED: ICD-10-PCS | Mod: CPTII,S$GLB,, | Performed by: PHYSICIAN ASSISTANT

## 2023-01-06 ENCOUNTER — TELEPHONE (OUTPATIENT)
Dept: OTOLARYNGOLOGY | Facility: CLINIC | Age: 83
End: 2023-01-06
Payer: MEDICARE

## 2023-01-06 NOTE — TELEPHONE ENCOUNTER
Pt will call to schedule audiogram since he will be out of town for a couple of weeks. Will send Psychiatrict msg when ready.

## 2023-01-06 NOTE — TELEPHONE ENCOUNTER
----- Message from Nathalia Amaya MA sent at 12/2/2022 11:34 AM CST -----  Regarding: [place] AUDIO  Schedule audiogram once audiology starts.

## 2023-01-25 NOTE — PROGRESS NOTES
"[facility] Note    PCP: Bart Burrows MD    Chief Complaint: BPH    SUBJECTIVE:       History of Present Illness:  Tha Guzman is a 82 y.o. male who presents to clinic for BPH. He is Established  to our clinic.     No longer taking Vesicare. Saw some effect on this however could not tolerate the dry mouth.   Frequency is still bothersome.   No gross hematuria.   Only on Uroxatral.     PVR 57 cc    7/25/22  Previously given trial of Vesicare 10 mg.   This has helped greatly. Frequency and incontinence are much better.   Side effects are manageable.   Still on the Finasteride and Uroxatral.     For the past week he has been having some slight dysuria and suprapubic discomfort.   Having regular bowel movements.     No further hematospermia.     UA today negative for blood, leuks, nitrite   PVR 84 cc    4/18/22  Patient was previously started on Finasteride. Has not noticed a big difference on this.   Urinating every 1.5-2 hours. He does have some urgency and mild urge incontinence.   Weak stream and straining to urinating.     No hematuria or dysuria.   Twice he has taken cipro twice for 5 days due to bothersome prostatitis symptoms.     PVR: 64 cc    10/14/21  Previous patient of Dr. Hawkins, last seen Jan 2021.  Hx of BPH for which he is on Uroxatral.   Hx of microscopic hematuria s/p negative workup in 2016.    Was seen by Gabriella on 9/16 and was prescribed bactrim x 21 days for prostatitis. He takes that this was a "copper/brown discharge" in his underwear. This was not associated with urination.   At that time he was having some discomfort with sitting and bowel movements.   Has regular daily bowel movements.     No hematuria. Some discomfort with urination. No dysuria.   Has some urinary frequency q 2 hours. Stream is weak. Sits to urinate. Has some urgency and urge incontinence.     UA today: +trace blood, leuk and nitrite negative   PVR today: 74 cc    Last urine culture: no " "documented UTIs  PSA 1/14/21: 4.1    Lab Results   Component Value Date    CREATININE 0.8 05/05/2022     Family  hx: brother and uncle had prostate cancer   Hx of HLD, HTN, IBS    Past medical, family, and social history reviewed as documented in chart with pertinent positive medical, family, and social history detailed in HPI.    Review of patient's allergies indicates:   Allergen Reactions    Aminoglycosides Other (See Comments)     Lips swelled    Meperidine Itching    Penicillins Itching       Past Medical History:   Diagnosis Date    Arthritis     Elevated PSA     Essential hypertension, benign     Mixed hyperlipidemia     Prostatitis, unspecified     Wears glasses      Past Surgical History:   Procedure Laterality Date    cataracts      COLONOSCOPY N/A 8/14/2019    Procedure: COLONOSCOPY;  Surgeon: Dominick Sr MD;  Location: [facility];  Service: Endoscopy;  Laterality: N/A;    HEMORRHOID SURGERY      KNEE ARTHROSCOPY W/ MENISCAL REPAIR Left     TONSILLECTOMY       Family History   Problem Relation Age of Onset    Cancer Brother         lunga cancer    Cancer Paternal Uncle         prostate cancer    Melanoma Neg Hx     Psoriasis Neg Hx     Lupus Neg Hx     Eczema Neg Hx      Social History     Tobacco Use    Smoking status: Former     Packs/day: 1.00     Years: 40.00     Pack years: 40.00     Types: Cigarettes     Quit date: 7/18/2007     Years since quitting: 15.5     Passive exposure: Past    Smokeless tobacco: Never   Substance Use Topics    Alcohol use: Yes     Alcohol/week: 5.0 standard drinks     Types: 6 Standard drinks or equivalent per week     Comment: OCCASSIONALLY    Drug use: No        Review of Systems    OBJECTIVE:     Anticoagulation:  Aspirin 81 mg     Estimated body mass index is 28.63 kg/m² as calculated from the following:    Height as of this encounter: 5' 3" (1.6 m).    Weight as of this encounter: 73.3 kg (161 lb 9.6 oz).    Vital Signs (Most Recent)  Pulse: 80 (01/30/23 " 0856)  Resp: 18 (01/30/23 0856)  BP: (!) 153/94 (01/30/23 0856)    Physical Exam  Constitutional:       General: He is not in acute distress.     Appearance: Normal appearance. He is not ill-appearing.   HENT:      Head: Normocephalic and atraumatic.   Eyes:      General: No scleral icterus.  Cardiovascular:      Rate and Rhythm: Normal rate and regular rhythm.   Pulmonary:      Effort: Pulmonary effort is normal. No respiratory distress.   Abdominal:      General: There is no distension.   Skin:     Coloration: Skin is not jaundiced.   Neurological:      General: No focal deficit present.      Mental Status: He is alert and oriented to person, place, and time.   Psychiatric:         Mood and Affect: Mood normal.         Behavior: Behavior normal.         Thought Content: Thought content normal.       BMP  Lab Results   Component Value Date     05/05/2022    K 3.8 05/05/2022     05/05/2022    CO2 24 05/05/2022    BUN 20 05/05/2022    CREATININE 0.8 05/05/2022    CALCIUM 9.3 05/05/2022    ANIONGAP 11 05/05/2022    ESTGFRAFRICA >60.0 05/05/2022    EGFRNONAA >60.0 05/05/2022       Lab Results   Component Value Date    WBC 7.93 05/05/2022    HGB 14.4 05/05/2022    HCT 43.7 05/05/2022    MCV 91 05/05/2022     05/05/2022       Lab Results   Component Value Date    PSA 3.69 06/11/2013    PSA 3.75 12/07/2012    PSA 4.91 (H) 09/26/2012    PSA 3.75 03/29/2012    PSA 4.51 (H) 03/07/2012    PSA 2.8 12/20/2010    PSA 3.2 09/13/2010    PSA 1.8 03/03/2010    PSADIAG 4.1 (H) 01/14/2021    PSADIAG 3.7 01/06/2020    PSADIAG 5.9 (H) 10/25/2019    PSADIAG 3.7 12/03/2018    PSADIAG 3.9 11/02/2017    PSADIAG 4.0 04/26/2017    PSADIAG 2.9 01/12/2016    PSADIAG 3.2 07/13/2015    PSADIAG 3.4 06/18/2014    PSADIAG 3.2 12/13/2013     Imaging:  US Retroperitoneal 3/23/16: Small bilateral renal cysts.  Prostate hypertrophy.    ASSESSMENT     1. Benign prostatic hyperplasia, unspecified whether lower urinary tract symptoms  present    2. Hyperlipidemia, unspecified hyperlipidemia type    3. Primary hypertension    4. Atherosclerosis of aorta    5. Irritable bowel syndrome, unspecified type      PLAN:     - Trial of Myrbetriq 25 mg. New script sent. Could not tolerate anticholinergic and given his age hesitant to prescribe further of these.   - Again offered workup with cysto/TRUS, however he is not interested in surgery at this point   - If doing well can follow up with NP in 6 months  - If still bothered he will let me know and we can do workup at any point     I spent 30 minutes with the patient. Over 50% of the visit was spent in counseling.      Emy Pompa MD

## 2023-01-30 ENCOUNTER — OFFICE VISIT (OUTPATIENT)
Dept: UROLOGY | Facility: CLINIC | Age: 83
End: 2023-01-30
Payer: MEDICARE

## 2023-01-30 VITALS
SYSTOLIC BLOOD PRESSURE: 153 MMHG | HEIGHT: 63 IN | HEART RATE: 80 BPM | WEIGHT: 161.63 LBS | RESPIRATION RATE: 18 BRPM | DIASTOLIC BLOOD PRESSURE: 94 MMHG | BODY MASS INDEX: 28.64 KG/M2

## 2023-01-30 DIAGNOSIS — E78.5 HYPERLIPIDEMIA, UNSPECIFIED HYPERLIPIDEMIA TYPE: ICD-10-CM

## 2023-01-30 DIAGNOSIS — K58.9 IRRITABLE BOWEL SYNDROME, UNSPECIFIED TYPE: ICD-10-CM

## 2023-01-30 DIAGNOSIS — I10 PRIMARY HYPERTENSION: ICD-10-CM

## 2023-01-30 DIAGNOSIS — I70.0 ATHEROSCLEROSIS OF AORTA: ICD-10-CM

## 2023-01-30 DIAGNOSIS — N40.0 BENIGN PROSTATIC HYPERPLASIA, UNSPECIFIED WHETHER LOWER URINARY TRACT SYMPTOMS PRESENT: Primary | ICD-10-CM

## 2023-01-30 LAB — POC RESIDUAL URINE VOLUME: 57 ML (ref 0–100)

## 2023-01-30 PROCEDURE — 3077F SYST BP >= 140 MM HG: CPT | Mod: CPTII,S$GLB,, | Performed by: STUDENT IN AN ORGANIZED HEALTH CARE EDUCATION/TRAINING PROGRAM

## 2023-01-30 PROCEDURE — 3080F DIAST BP >= 90 MM HG: CPT | Mod: CPTII,S$GLB,, | Performed by: STUDENT IN AN ORGANIZED HEALTH CARE EDUCATION/TRAINING PROGRAM

## 2023-01-30 PROCEDURE — 99999 PR PBB SHADOW E&M-EST. PATIENT-LVL III: ICD-10-PCS | Mod: PBBFAC,,, | Performed by: STUDENT IN AN ORGANIZED HEALTH CARE EDUCATION/TRAINING PROGRAM

## 2023-01-30 PROCEDURE — 1126F PR PAIN SEVERITY QUANTIFIED, NO PAIN PRESENT: ICD-10-PCS | Mod: CPTII,S$GLB,, | Performed by: STUDENT IN AN ORGANIZED HEALTH CARE EDUCATION/TRAINING PROGRAM

## 2023-01-30 PROCEDURE — 3080F PR MOST RECENT DIASTOLIC BLOOD PRESSURE >= 90 MM HG: ICD-10-PCS | Mod: CPTII,S$GLB,, | Performed by: STUDENT IN AN ORGANIZED HEALTH CARE EDUCATION/TRAINING PROGRAM

## 2023-01-30 PROCEDURE — 3288F PR FALLS RISK ASSESSMENT DOCUMENTED: ICD-10-PCS | Mod: CPTII,S$GLB,, | Performed by: STUDENT IN AN ORGANIZED HEALTH CARE EDUCATION/TRAINING PROGRAM

## 2023-01-30 PROCEDURE — 51798 POCT BLADDER SCAN: ICD-10-PCS | Mod: S$GLB,,, | Performed by: STUDENT IN AN ORGANIZED HEALTH CARE EDUCATION/TRAINING PROGRAM

## 2023-01-30 PROCEDURE — 51798 US URINE CAPACITY MEASURE: CPT | Mod: S$GLB,,, | Performed by: STUDENT IN AN ORGANIZED HEALTH CARE EDUCATION/TRAINING PROGRAM

## 2023-01-30 PROCEDURE — 1126F AMNT PAIN NOTED NONE PRSNT: CPT | Mod: CPTII,S$GLB,, | Performed by: STUDENT IN AN ORGANIZED HEALTH CARE EDUCATION/TRAINING PROGRAM

## 2023-01-30 PROCEDURE — 99214 PR OFFICE/OUTPT VISIT, EST, LEVL IV, 30-39 MIN: ICD-10-PCS | Mod: S$GLB,,, | Performed by: STUDENT IN AN ORGANIZED HEALTH CARE EDUCATION/TRAINING PROGRAM

## 2023-01-30 PROCEDURE — 1101F PT FALLS ASSESS-DOCD LE1/YR: CPT | Mod: CPTII,S$GLB,, | Performed by: STUDENT IN AN ORGANIZED HEALTH CARE EDUCATION/TRAINING PROGRAM

## 2023-01-30 PROCEDURE — 1101F PR PT FALLS ASSESS DOC 0-1 FALLS W/OUT INJ PAST YR: ICD-10-PCS | Mod: CPTII,S$GLB,, | Performed by: STUDENT IN AN ORGANIZED HEALTH CARE EDUCATION/TRAINING PROGRAM

## 2023-01-30 PROCEDURE — 99214 OFFICE O/P EST MOD 30 MIN: CPT | Mod: S$GLB,,, | Performed by: STUDENT IN AN ORGANIZED HEALTH CARE EDUCATION/TRAINING PROGRAM

## 2023-01-30 PROCEDURE — 99999 PR PBB SHADOW E&M-EST. PATIENT-LVL III: CPT | Mod: PBBFAC,,, | Performed by: STUDENT IN AN ORGANIZED HEALTH CARE EDUCATION/TRAINING PROGRAM

## 2023-01-30 PROCEDURE — 3077F PR MOST RECENT SYSTOLIC BLOOD PRESSURE >= 140 MM HG: ICD-10-PCS | Mod: CPTII,S$GLB,, | Performed by: STUDENT IN AN ORGANIZED HEALTH CARE EDUCATION/TRAINING PROGRAM

## 2023-01-30 PROCEDURE — 3288F FALL RISK ASSESSMENT DOCD: CPT | Mod: CPTII,S$GLB,, | Performed by: STUDENT IN AN ORGANIZED HEALTH CARE EDUCATION/TRAINING PROGRAM

## 2023-05-09 ENCOUNTER — OFFICE VISIT (OUTPATIENT)
Dept: FAMILY MEDICINE | Facility: CLINIC | Age: 83
End: 2023-05-09
Payer: MEDICARE

## 2023-05-09 ENCOUNTER — LAB VISIT (OUTPATIENT)
Dept: LAB | Facility: HOSPITAL | Age: 83
End: 2023-05-09
Attending: STUDENT IN AN ORGANIZED HEALTH CARE EDUCATION/TRAINING PROGRAM
Payer: MEDICARE

## 2023-05-09 VITALS
HEART RATE: 81 BPM | DIASTOLIC BLOOD PRESSURE: 78 MMHG | HEIGHT: 63 IN | OXYGEN SATURATION: 94 % | TEMPERATURE: 99 F | SYSTOLIC BLOOD PRESSURE: 112 MMHG | BODY MASS INDEX: 28.52 KG/M2 | RESPIRATION RATE: 16 BRPM | WEIGHT: 160.94 LBS

## 2023-05-09 DIAGNOSIS — R79.9 ABNORMAL FINDING OF BLOOD CHEMISTRY, UNSPECIFIED: ICD-10-CM

## 2023-05-09 DIAGNOSIS — Z00.00 HEALTHCARE MAINTENANCE: Primary | ICD-10-CM

## 2023-05-09 DIAGNOSIS — I10 PRIMARY HYPERTENSION: ICD-10-CM

## 2023-05-09 DIAGNOSIS — I70.0 ATHEROSCLEROSIS OF AORTA: ICD-10-CM

## 2023-05-09 DIAGNOSIS — M17.0 PRIMARY OSTEOARTHRITIS OF BOTH KNEES: ICD-10-CM

## 2023-05-09 DIAGNOSIS — Z12.11 COLON CANCER SCREENING: ICD-10-CM

## 2023-05-09 DIAGNOSIS — E78.5 HYPERLIPIDEMIA, UNSPECIFIED HYPERLIPIDEMIA TYPE: ICD-10-CM

## 2023-05-09 DIAGNOSIS — M26.629 TMJ SYNDROME: ICD-10-CM

## 2023-05-09 LAB
ALBUMIN SERPL BCP-MCNC: 4 G/DL (ref 3.5–5.2)
ALP SERPL-CCNC: 48 U/L (ref 55–135)
ALT SERPL W/O P-5'-P-CCNC: 15 U/L (ref 10–44)
ANION GAP SERPL CALC-SCNC: 8 MMOL/L (ref 8–16)
AST SERPL-CCNC: 26 U/L (ref 10–40)
BASOPHILS # BLD AUTO: 0.03 K/UL (ref 0–0.2)
BASOPHILS NFR BLD: 0.6 % (ref 0–1.9)
BILIRUB SERPL-MCNC: 0.7 MG/DL (ref 0.1–1)
BUN SERPL-MCNC: 16 MG/DL (ref 8–23)
CALCIUM SERPL-MCNC: 9.8 MG/DL (ref 8.7–10.5)
CHLORIDE SERPL-SCNC: 107 MMOL/L (ref 95–110)
CHOLEST SERPL-MCNC: 155 MG/DL (ref 120–199)
CHOLEST/HDLC SERPL: 3 {RATIO} (ref 2–5)
CO2 SERPL-SCNC: 24 MMOL/L (ref 23–29)
CREAT SERPL-MCNC: 0.8 MG/DL (ref 0.5–1.4)
DIFFERENTIAL METHOD: ABNORMAL
EOSINOPHIL # BLD AUTO: 0.2 K/UL (ref 0–0.5)
EOSINOPHIL NFR BLD: 3.2 % (ref 0–8)
ERYTHROCYTE [DISTWIDTH] IN BLOOD BY AUTOMATED COUNT: 12.5 % (ref 11.5–14.5)
EST. GFR  (NO RACE VARIABLE): >60 ML/MIN/1.73 M^2
ESTIMATED AVG GLUCOSE: 97 MG/DL (ref 68–131)
GLUCOSE SERPL-MCNC: 99 MG/DL (ref 70–110)
HBA1C MFR BLD: 5 % (ref 4–5.6)
HCT VFR BLD AUTO: 42.6 % (ref 40–54)
HDLC SERPL-MCNC: 51 MG/DL (ref 40–75)
HDLC SERPL: 32.9 % (ref 20–50)
HGB BLD-MCNC: 14.5 G/DL (ref 14–18)
IMM GRANULOCYTES # BLD AUTO: 0.03 K/UL (ref 0–0.04)
IMM GRANULOCYTES NFR BLD AUTO: 0.6 % (ref 0–0.5)
LDLC SERPL CALC-MCNC: 87 MG/DL (ref 63–159)
LYMPHOCYTES # BLD AUTO: 1.1 K/UL (ref 1–4.8)
LYMPHOCYTES NFR BLD: 21.9 % (ref 18–48)
MCH RBC QN AUTO: 30.5 PG (ref 27–31)
MCHC RBC AUTO-ENTMCNC: 34 G/DL (ref 32–36)
MCV RBC AUTO: 90 FL (ref 82–98)
MONOCYTES # BLD AUTO: 0.5 K/UL (ref 0.3–1)
MONOCYTES NFR BLD: 10.7 % (ref 4–15)
NEUTROPHILS # BLD AUTO: 3.1 K/UL (ref 1.8–7.7)
NEUTROPHILS NFR BLD: 63 % (ref 38–73)
NONHDLC SERPL-MCNC: 104 MG/DL
NRBC BLD-RTO: 0 /100 WBC
PLATELET # BLD AUTO: 210 K/UL (ref 150–450)
PMV BLD AUTO: 8.3 FL (ref 9.2–12.9)
POTASSIUM SERPL-SCNC: 4 MMOL/L (ref 3.5–5.1)
PROT SERPL-MCNC: 7.1 G/DL (ref 6–8.4)
RBC # BLD AUTO: 4.75 M/UL (ref 4.6–6.2)
SODIUM SERPL-SCNC: 139 MMOL/L (ref 136–145)
TRIGL SERPL-MCNC: 85 MG/DL (ref 30–150)
WBC # BLD AUTO: 4.94 K/UL (ref 3.9–12.7)

## 2023-05-09 PROCEDURE — 83036 HEMOGLOBIN GLYCOSYLATED A1C: CPT | Performed by: STUDENT IN AN ORGANIZED HEALTH CARE EDUCATION/TRAINING PROGRAM

## 2023-05-09 PROCEDURE — 99999 PR PBB SHADOW E&M-EST. PATIENT-LVL III: CPT | Mod: PBBFAC,,, | Performed by: STUDENT IN AN ORGANIZED HEALTH CARE EDUCATION/TRAINING PROGRAM

## 2023-05-09 PROCEDURE — 3074F PR MOST RECENT SYSTOLIC BLOOD PRESSURE < 130 MM HG: ICD-10-PCS | Mod: CPTII,S$GLB,, | Performed by: STUDENT IN AN ORGANIZED HEALTH CARE EDUCATION/TRAINING PROGRAM

## 2023-05-09 PROCEDURE — 3288F PR FALLS RISK ASSESSMENT DOCUMENTED: ICD-10-PCS | Mod: CPTII,S$GLB,, | Performed by: STUDENT IN AN ORGANIZED HEALTH CARE EDUCATION/TRAINING PROGRAM

## 2023-05-09 PROCEDURE — 85025 COMPLETE CBC W/AUTO DIFF WBC: CPT | Performed by: STUDENT IN AN ORGANIZED HEALTH CARE EDUCATION/TRAINING PROGRAM

## 2023-05-09 PROCEDURE — 80061 LIPID PANEL: CPT | Performed by: STUDENT IN AN ORGANIZED HEALTH CARE EDUCATION/TRAINING PROGRAM

## 2023-05-09 PROCEDURE — 3074F SYST BP LT 130 MM HG: CPT | Mod: CPTII,S$GLB,, | Performed by: STUDENT IN AN ORGANIZED HEALTH CARE EDUCATION/TRAINING PROGRAM

## 2023-05-09 PROCEDURE — 1101F PR PT FALLS ASSESS DOC 0-1 FALLS W/OUT INJ PAST YR: ICD-10-PCS | Mod: CPTII,S$GLB,, | Performed by: STUDENT IN AN ORGANIZED HEALTH CARE EDUCATION/TRAINING PROGRAM

## 2023-05-09 PROCEDURE — 1126F PR PAIN SEVERITY QUANTIFIED, NO PAIN PRESENT: ICD-10-PCS | Mod: CPTII,S$GLB,, | Performed by: STUDENT IN AN ORGANIZED HEALTH CARE EDUCATION/TRAINING PROGRAM

## 2023-05-09 PROCEDURE — 99214 OFFICE O/P EST MOD 30 MIN: CPT | Mod: S$GLB,,, | Performed by: STUDENT IN AN ORGANIZED HEALTH CARE EDUCATION/TRAINING PROGRAM

## 2023-05-09 PROCEDURE — 1101F PT FALLS ASSESS-DOCD LE1/YR: CPT | Mod: CPTII,S$GLB,, | Performed by: STUDENT IN AN ORGANIZED HEALTH CARE EDUCATION/TRAINING PROGRAM

## 2023-05-09 PROCEDURE — 1126F AMNT PAIN NOTED NONE PRSNT: CPT | Mod: CPTII,S$GLB,, | Performed by: STUDENT IN AN ORGANIZED HEALTH CARE EDUCATION/TRAINING PROGRAM

## 2023-05-09 PROCEDURE — 3078F PR MOST RECENT DIASTOLIC BLOOD PRESSURE < 80 MM HG: ICD-10-PCS | Mod: CPTII,S$GLB,, | Performed by: STUDENT IN AN ORGANIZED HEALTH CARE EDUCATION/TRAINING PROGRAM

## 2023-05-09 PROCEDURE — 1159F PR MEDICATION LIST DOCUMENTED IN MEDICAL RECORD: ICD-10-PCS | Mod: CPTII,S$GLB,, | Performed by: STUDENT IN AN ORGANIZED HEALTH CARE EDUCATION/TRAINING PROGRAM

## 2023-05-09 PROCEDURE — 99214 PR OFFICE/OUTPT VISIT, EST, LEVL IV, 30-39 MIN: ICD-10-PCS | Mod: S$GLB,,, | Performed by: STUDENT IN AN ORGANIZED HEALTH CARE EDUCATION/TRAINING PROGRAM

## 2023-05-09 PROCEDURE — 1159F MED LIST DOCD IN RCRD: CPT | Mod: CPTII,S$GLB,, | Performed by: STUDENT IN AN ORGANIZED HEALTH CARE EDUCATION/TRAINING PROGRAM

## 2023-05-09 PROCEDURE — 3078F DIAST BP <80 MM HG: CPT | Mod: CPTII,S$GLB,, | Performed by: STUDENT IN AN ORGANIZED HEALTH CARE EDUCATION/TRAINING PROGRAM

## 2023-05-09 PROCEDURE — 80053 COMPREHEN METABOLIC PANEL: CPT | Performed by: STUDENT IN AN ORGANIZED HEALTH CARE EDUCATION/TRAINING PROGRAM

## 2023-05-09 PROCEDURE — 3288F FALL RISK ASSESSMENT DOCD: CPT | Mod: CPTII,S$GLB,, | Performed by: STUDENT IN AN ORGANIZED HEALTH CARE EDUCATION/TRAINING PROGRAM

## 2023-05-09 PROCEDURE — 36415 COLL VENOUS BLD VENIPUNCTURE: CPT | Mod: PO | Performed by: STUDENT IN AN ORGANIZED HEALTH CARE EDUCATION/TRAINING PROGRAM

## 2023-05-09 PROCEDURE — 99999 PR PBB SHADOW E&M-EST. PATIENT-LVL III: ICD-10-PCS | Mod: PBBFAC,,, | Performed by: STUDENT IN AN ORGANIZED HEALTH CARE EDUCATION/TRAINING PROGRAM

## 2023-05-09 RX ORDER — HYDROCODONE BITARTRATE AND ACETAMINOPHEN 7.5; 325 MG/1; MG/1
TABLET ORAL
Qty: 90 TABLET | Refills: 0 | Status: SHIPPED | OUTPATIENT
Start: 2023-05-09 | End: 2023-12-07 | Stop reason: SDUPTHER

## 2023-05-09 NOTE — PROGRESS NOTES
[facility] Note    Subjective:    The HPI and pertinent ROS is included in the Diagnostic Impression Remarks section at the end of the note. Please see below for further details. Chief complaint is at end of note.     Tha is a pleasant intelligent patient who is here for evaluation.     Modified Medications    Modified Medication Previous Medication    HYDROCODONE-ACETAMINOPHEN (NORCO) 7.5-325 MG PER TABLET HYDROcodone-acetaminophen (NORCO) 7.5-325 mg per tablet       1 tablet PO TID prn severe pain    1 tablet PO TID prn severe pain       Data reviewed 274}  Previous medical records reviewed and summarized in plan section at end of note.      If you are due for any health screening(s) below please notify me so we can arrange them to be ordered and scheduled. Most healthy patients at your age complete them, but you are free to accept or refuse. If you can't do it, I'll definitely understand. If you can, I'd certainly appreciate it!     Tests to Keep You Healthy    Colon Cancer Screening: ORDERED BUT NOT SCHEDULED  Last Blood Pressure <= 139/89 (5/9/2023): Yes      The following portions of the patient's history were reviewed and updated as appropriate: allergies, current medications, past family history, past medical history, past social history, past surgical history and problem list.    He  has a past medical history of Arthritis, Elevated PSA, Essential hypertension, benign, Mixed hyperlipidemia, Prostatitis, unspecified, and Wears glasses.  He  has a past surgical history that includes Knee arthroscopy w/ meniscal repair (Left); Tonsillectomy; Hemorrhoid surgery; cataracts; and Colonoscopy (N/A, 8/14/2019).    He  reports that he quit smoking about 15 years ago. His smoking use included cigarettes. He has a 40.00 pack-year smoking history. He has been exposed to tobacco smoke. He has never used smokeless tobacco. He reports current alcohol use of about 5.0 standard drinks per week. He reports  "that he does not use drugs.  He family history includes Cancer in his brother and paternal uncle.    Review of patient's allergies indicates:   Allergen Reactions    Aminoglycosides Other (See Comments)     Lips swelled    Meperidine Itching    Penicillins Itching       Tobacco Use: Medium Risk    Smoking Tobacco Use: Former    Smokeless Tobacco Use: Never    Passive Exposure: Past     Physical Examination  General appearance: alert, cooperative, no distress  Neck: no thyromegaly, no neck stiffness  Lungs: clear to auscultation, no wheezes, rales or rhonchi, symmetric air entry  Heart: normal rate, regular rhythm, normal S1, S2, no murmurs, rubs, clicks or gallops  Abdomen: soft, nontender, nondistended, no rigidity, rebound, or guarding.   Back: no point tenderness over spine  Extremities: peripheral pulses normal, no unilateral leg swelling or calf tenderness   Neurological:alert, oriented, normal speech, no new focal findings or movement disorder noted from baseline    BP Readings from Last 3 Encounters:   05/09/23 112/78   01/30/23 (!) 153/94   11/16/22 118/80     Wt Readings from Last 3 Encounters:   05/09/23 73 kg (160 lb 15 oz)   01/30/23 73.3 kg (161 lb 9.6 oz)   12/02/22 73.3 kg (161 lb 9.6 oz)     /78 (BP Location: Right arm, Patient Position: Sitting, BP Method: Large (Manual))   Pulse 81   Temp 98.5 °F (36.9 °C) (Oral)   Resp 16   Ht 5' 3" (1.6 m)   Wt 73 kg (160 lb 15 oz)   SpO2 (!) 94%   BMI 28.51 kg/m²    274}  Laboratory: I have reviewed old labs below:    274}    Lab Results   Component Value Date    WBC 7.93 05/05/2022    HGB 14.4 05/05/2022    HCT 43.7 05/05/2022    MCV 91 05/05/2022     05/05/2022     05/05/2022    K 3.8 05/05/2022     05/05/2022    CALCIUM 9.3 05/05/2022    CO2 24 05/05/2022    GLU 82 05/05/2022    BUN 20 05/05/2022    CREATININE 0.8 05/05/2022    ANIONGAP 11 05/05/2022    PROT 6.5 05/05/2022    ALBUMIN 3.8 05/05/2022    BILITOT 0.9 05/05/2022    " "ALKPHOS 42 (L) 05/05/2022    ALT 14 05/05/2022    AST 18 05/05/2022    INR 1.0 04/06/2016    CHOL 163 11/08/2022    TRIG 114 11/08/2022    HDL 53 11/08/2022    LDLCALC 87.2 11/08/2022    TSH 1.255 10/04/2018    PSA 3.69 06/11/2013    HGBA1C 5.2 05/05/2022     Lab reviewed by me: Particular labs of significance that I will monitor, workup, or treat to improve are mentioned below in diagnostic impression remarks.    Imaging/EKG: I have reviewed the pertinent results and my findings are noted in remarks.  274}    CC:   Chief Complaint   Patient presents with    Follow-up    Hypertension        274}    Assessment/Plan  Tha Guzman is a 83 y.o. male who presents to clinic with:  1. Healthcare maintenance    2. Primary hypertension    3. Hyperlipidemia, unspecified hyperlipidemia type    4. Atherosclerosis of aorta    5. Colon cancer screening    6. Primary osteoarthritis of both knees    7. Abnormal finding of blood chemistry, unspecified    8. TMJ syndrome       274}  Diagnostic Impression Remarks + HPI     Documentation entered by me for this encounter may have been done in part using speech-recognition technology. Although I have made an effort to ensure accuracy, "sound like" errors may exist and should be interpreted in context.     HTN-well controlled cont current meds   HLD-stable cont statin monitor will get blood work   Atherosclerosis of aorta- stable continue statin and rec healthy diet monitor follow lipid levels   Osteoarthritis knee poorly controlled only takes Norco very intermittently not every day not taking NSAIDs due to side effects   TMJ-using Voltaren which is improving his pain recommend activity modification consider steroid      The patient is meeting the goals of opioid therapy and is dependent on them for functionality and can not perform ADLS without them. Regarding opioids, the risks were discussed including tolerance, addiction, overdose, over-sedation, drug interactions, liver damage, " hormone imbalance, dental decay, opioid-induced hyperalgesia, and even death. I cautioned the patient that the medications that are being taken are dangerous and can result in death from overdose. They can also result in death if taken by someone else without medical supervision. I also warned the patient to not drive or operate heavy machinery while taking these medications. I discussed the use of narcan if needed and he verbalized understanding. The patient agreed to teach a family member living in the same home on how and when to use it if the scenario of a drug overdose occurs. Shared decision making occurred and he agreed with this treatment plan.     I discussed with him the proper use of narcotics, including storing the narcotics in a safe and locked place and promptly disposing any unused narcotics once he no longer needs them. We specifically discussed that he is never to give any medication, and especially narcotic pain medication, to another person and they are only for him.  We talked about the risks including respiratory depression and about Naloxone as an antidote for narcotic overdose. We discussed that he is to avoid alcohol, benzodiazapenes, and sedating drugs while taking narcotics.    We specifically discussed non-opioid alternatives and patient offered lower dose of opioid medication.    He has no suicidal thoughts or intent and he has no weapons access and he is future oriented with support in the community and I emphasized that if he ever develops thoughts of hurting he to directly go to the emergency department which he agreed to and he is willing to have close follow up with me.     was reviewed today (05/09/2023) and determined to be appropriate for continued opioid therapy.    The addictive potential of the medications were discussed and he agrees to inform us or the PCP if any compulsion to taking his medications for any reason other than for pain arises. He agrees to safe-guard all  medications from unintentional or intentional use or misuse by other persons who may potentially have access to their premises, including but not limited to significant others, children, parents, friends, and even strangers. He takes sole responsibility for any consequences that may occur from not doing so.    This is the extent of this pleasant patient's concerns at this present time. He did not feel chest pain upon exertion, dyspnea, nausea, vomiting, diaphoresis, or syncope. No pleuritic chest pain, unilateral leg swelling, calf tenderness, or calf pain. Negative for unintentional weight loss night sweats and fevers. Tha will return to clinic in a few months for further workup and reassessment or sooner as needed. He was instructed to call the clinic or go to the emergency department or urgent care immediately if his symptoms do not improve, worsens, or if any new symptoms develop. As we discussed that symptoms could worsen over the next 24 hours he was advised that if any increased swelling, pain, or numbness arise to go immediately to the ED. Patient knows to call any time if an emergency arises. Shared decision making occurred and he verbalized understanding in agreement with this plan. I discussed imaging findings, diagnosis, possibilities, treatment options, medications, risks, and benefits. He had many questions regarding the options and long-term effects. All questions were answered. He expressed understanding after counseling regarding the diagnosis and recommendations. He was capable and demonstrated competence with understanding of these options. Shared decision making was performed resulting in him choosing the current treatment plan. Patient handout was given with instructions and recommendations. Advised the patient that if they become pregnant to alert us immediately to assess for medication changes. I also discussed the importance of close follow up to discuss labs, change or modify his  medications if needed, monitor side effects, and further evaluation of medical problems.     Additional workup planned: see labs ordered below.    See below for labs and meds ordered with associated diagnosis      1. Healthcare maintenance    2. Primary hypertension  - Comprehensive Metabolic Panel; Future    3. Hyperlipidemia, unspecified hyperlipidemia type  - Lipid Panel; Future    4. Atherosclerosis of aorta    5. Colon cancer screening  - Case Request Endoscopy: COLONOSCOPY    6. Primary osteoarthritis of both knees  - HYDROcodone-acetaminophen (NORCO) 7.5-325 mg per tablet; 1 tablet PO TID prn severe pain  Dispense: 90 tablet; Refill: 0    7. Abnormal finding of blood chemistry, unspecified  - CBC Auto Differential; Future  - Hemoglobin A1C; Future    8. TMJ syndrome      Bart Burrows MD   274}    If you are due for any health screening(s) below please notify me so we can arrange them to be ordered and scheduled. Most healthy patients at your age complete them, but you are free to accept or refuse.     If you can't do it, I'll definitely understand. If you can, I'd certainly appreciate it!   Tests to Keep You Healthy    Colon Cancer Screening: ORDERED BUT NOT SCHEDULED  Last Blood Pressure <= 139/89 (5/9/2023): Yes

## 2023-05-10 ENCOUNTER — TELEPHONE (OUTPATIENT)
Dept: GASTROENTEROLOGY | Facility: CLINIC | Age: 83
End: 2023-05-10
Payer: MEDICARE

## 2023-06-19 DIAGNOSIS — I10 ESSENTIAL HYPERTENSION: ICD-10-CM

## 2023-06-19 DIAGNOSIS — E78.5 HYPERLIPIDEMIA, UNSPECIFIED HYPERLIPIDEMIA TYPE: ICD-10-CM

## 2023-06-19 RX ORDER — AMLODIPINE AND BENAZEPRIL HYDROCHLORIDE 5; 20 MG/1; MG/1
CAPSULE ORAL
Qty: 90 CAPSULE | Refills: 3 | Status: SHIPPED | OUTPATIENT
Start: 2023-06-19

## 2023-06-19 RX ORDER — SIMVASTATIN 20 MG/1
TABLET, FILM COATED ORAL
Qty: 90 TABLET | Refills: 3 | Status: SHIPPED | OUTPATIENT
Start: 2023-06-19

## 2023-06-19 NOTE — TELEPHONE ENCOUNTER
No care due was identified.  [employer] Embedded Care Due Messages. Reference number: [id number].   6/19/2023 11:39:03 AM CDT

## 2023-06-20 NOTE — TELEPHONE ENCOUNTER
Refill Decision Note   Tha Guzman  is requesting a refill authorization.  Brief Assessment and Rationale for Refill:  Approve     Medication Therapy Plan:         Comments:     Note composed:8:38 PM 06/19/2023

## 2023-07-31 ENCOUNTER — OFFICE VISIT (OUTPATIENT)
Dept: UROLOGY | Facility: CLINIC | Age: 83
End: 2023-07-31
Payer: MEDICARE

## 2023-07-31 DIAGNOSIS — N13.8 BPH WITH OBSTRUCTION/LOWER URINARY TRACT SYMPTOMS: Primary | ICD-10-CM

## 2023-07-31 DIAGNOSIS — R35.0 URINARY FREQUENCY: ICD-10-CM

## 2023-07-31 DIAGNOSIS — N40.1 BPH WITH OBSTRUCTION/LOWER URINARY TRACT SYMPTOMS: Primary | ICD-10-CM

## 2023-07-31 PROCEDURE — 1159F MED LIST DOCD IN RCRD: CPT | Mod: CPTII,S$GLB,, | Performed by: NURSE PRACTITIONER

## 2023-07-31 PROCEDURE — 3288F PR FALLS RISK ASSESSMENT DOCUMENTED: ICD-10-PCS | Mod: CPTII,S$GLB,, | Performed by: NURSE PRACTITIONER

## 2023-07-31 PROCEDURE — 1101F PT FALLS ASSESS-DOCD LE1/YR: CPT | Mod: CPTII,S$GLB,, | Performed by: NURSE PRACTITIONER

## 2023-07-31 PROCEDURE — 1160F RVW MEDS BY RX/DR IN RCRD: CPT | Mod: CPTII,S$GLB,, | Performed by: NURSE PRACTITIONER

## 2023-07-31 PROCEDURE — 99999 PR PBB SHADOW E&M-EST. PATIENT-LVL III: ICD-10-PCS | Mod: PBBFAC,,, | Performed by: NURSE PRACTITIONER

## 2023-07-31 PROCEDURE — 1101F PR PT FALLS ASSESS DOC 0-1 FALLS W/OUT INJ PAST YR: ICD-10-PCS | Mod: CPTII,S$GLB,, | Performed by: NURSE PRACTITIONER

## 2023-07-31 PROCEDURE — 99214 OFFICE O/P EST MOD 30 MIN: CPT | Mod: S$GLB,,, | Performed by: NURSE PRACTITIONER

## 2023-07-31 PROCEDURE — 99214 PR OFFICE/OUTPT VISIT, EST, LEVL IV, 30-39 MIN: ICD-10-PCS | Mod: S$GLB,,, | Performed by: NURSE PRACTITIONER

## 2023-07-31 PROCEDURE — 1159F PR MEDICATION LIST DOCUMENTED IN MEDICAL RECORD: ICD-10-PCS | Mod: CPTII,S$GLB,, | Performed by: NURSE PRACTITIONER

## 2023-07-31 PROCEDURE — 51798 US URINE CAPACITY MEASURE: CPT | Mod: S$GLB,,, | Performed by: NURSE PRACTITIONER

## 2023-07-31 PROCEDURE — 1160F PR REVIEW ALL MEDS BY PRESCRIBER/CLIN PHARMACIST DOCUMENTED: ICD-10-PCS | Mod: CPTII,S$GLB,, | Performed by: NURSE PRACTITIONER

## 2023-07-31 PROCEDURE — 99999 PR PBB SHADOW E&M-EST. PATIENT-LVL III: CPT | Mod: PBBFAC,,, | Performed by: NURSE PRACTITIONER

## 2023-07-31 PROCEDURE — 51798 PR MEAS,POST-VOID RES,US,NON-IMAGING: ICD-10-PCS | Mod: S$GLB,,, | Performed by: NURSE PRACTITIONER

## 2023-07-31 PROCEDURE — 3288F FALL RISK ASSESSMENT DOCD: CPT | Mod: CPTII,S$GLB,, | Performed by: NURSE PRACTITIONER

## 2023-07-31 NOTE — PROGRESS NOTES
"CHIEF COMPLAINT:    Mr. Guzman is a 83 y.o. male presenting for f/u LUTS  PRESENTING ILLNESS:    Tha Guzman is a 83 y.o. male who presents for f/u LUTS. Last clinic visit was 1/30/23 with Dr. Pompa    7/31/23  Patient was started on myrbetriq 25 mg last clinic visit. He continues to have frequency but no longer having leakage trying to get to the bathroom. He is also taking uroxatral. He is satisfied with results of myrbetriq. Denies dysuria, gross hematuria, flank pain, fever, chills, nausea or vomiting  PVR 62 ml    1/30/23  No longer taking Vesicare. Saw some effect on this however could not tolerate the dry mouth.   Frequency is still bothersome.   No gross hematuria.   Only on Uroxatral.      PVR 57 cc     7/25/22  Previously given trial of Vesicare 10 mg.   This has helped greatly. Frequency and incontinence are much better.   Side effects are manageable.   Still on the Finasteride and Uroxatral.      For the past week he has been having some slight dysuria and suprapubic discomfort.   Having regular bowel movements.      No further hematospermia.      UA today negative for blood, leuks, nitrite   PVR 84 cc     4/18/22  Patient was previously started on Finasteride. Has not noticed a big difference on this.   Urinating every 1.5-2 hours. He does have some urgency and mild urge incontinence.   Weak stream and straining to urinating.      No hematuria or dysuria.   Twice he has taken cipro twice for 5 days due to bothersome prostatitis symptoms.      PVR: 64 cc     10/14/21  Previous patient of Dr. Hawkins, last seen Jan 2021.  Hx of BPH for which he is on Uroxatral.   Hx of microscopic hematuria s/p negative workup in 2016.     Was seen by Gabriella on 9/16 and was prescribed bactrim x 21 days for prostatitis. He takes that this was a "copper/brown discharge" in his underwear. This was not associated with urination.   At that time he was having some discomfort with sitting and bowel movements.   Has " regular daily bowel movements.      No hematuria. Some discomfort with urination. No dysuria.   Has some urinary frequency q 2 hours. Stream is weak. Sits to urinate. Has some urgency and urge incontinence.      UA today: +trace blood, leuk and nitrite negative   PVR today: 74 cc     Last urine culture: no documented UTIs  PSA 21: 4.1       REVIEW OF SYSTEMS:    Review of Systems    Constitutional: Negative for fever and chills.   HENT: Negative for hearing loss.   Eyes: Negative for visual disturbance.   Respiratory: Negative for shortness of breath.   Cardiovascular: Negative for chest pain.   Gastrointestinal: Negative for nausea, vomiting.   Genitourinary:  See above  Neurological: Negative for dizziness.   Hematological: Does not bruise/bleed easily.   Psychiatric/Behavioral: Negative for confusion.       PATIENT HISTORY:    Past Medical History:   Diagnosis Date    Arthritis     Elevated PSA     Essential hypertension, benign     Mixed hyperlipidemia     Prostatitis, unspecified     Wears glasses        Past Surgical History:   Procedure Laterality Date    cataracts      COLONOSCOPY N/A 2019    Procedure: COLONOSCOPY;  Surgeon: Dominick Sr MD;  Location: [facility];  Service: Endoscopy;  Laterality: N/A;    HEMORRHOID SURGERY      KNEE ARTHROSCOPY W/ MENISCAL REPAIR Left     TONSILLECTOMY         Family History   Problem Relation Age of Onset    Cancer Brother         lunga cancer    Cancer Paternal Uncle         prostate cancer    Melanoma Neg Hx     Psoriasis Neg Hx     Lupus Neg Hx     Eczema Neg Hx        Social History     Socioeconomic History    Marital status:    Tobacco Use    Smoking status: Former     Current packs/day: 0.00     Average packs/day: 1 pack/day for 40.0 years (40.0 ttl pk-yrs)     Types: Cigarettes     Start date: 1967     Quit date: 2007     Years since quittin.0     Passive exposure: Past    Smokeless tobacco: Never   Substance and Sexual Activity     Alcohol use: Yes     Alcohol/week: 5.0 standard drinks of alcohol     Types: 6 Standard drinks or equivalent per week     Comment: OCCASSIONALLY    Drug use: No    Sexual activity: Not Currently     Social Determinants of Health     Financial Resource Strain: Low Risk  (5/2/2023)    Overall Financial Resource Strain (CARDIA)     Difficulty of Paying Living Expenses: Not hard at all   Food Insecurity: No Food Insecurity (5/2/2023)    Hunger Vital Sign     Worried About Running Out of Food in the Last Year: Never true     Ran Out of Food in the Last Year: Never true   Transportation Needs: No Transportation Needs (5/2/2023)    PRAPARE - Transportation     Lack of Transportation (Medical): No     Lack of Transportation (Non-Medical): No   Physical Activity: Insufficiently Active (5/2/2023)    Exercise Vital Sign     Days of Exercise per Week: 2 days     Minutes of Exercise per Session: 60 min   Stress: No Stress Concern Present (5/2/2023)    [employer] - Occupational Stress Questionnaire     Feeling of Stress : Not at all   Social Connections: Unknown (5/2/2023)    Social Connection and Isolation Panel [NHANES]     Frequency of Communication with Friends and Family: Twice a week     Frequency of Social Gatherings with Friends and Family: Once a week     Active Member of Clubs or Organizations: No     Attends Club or Organization Meetings: Never     Marital Status:    Housing Stability: Low Risk  (5/2/2023)    Housing Stability Vital Sign     Unable to Pay for Housing in the Last Year: No     Number of Places Lived in the Last Year: 1     Unstable Housing in the Last Year: No       Allergies:  Aminoglycosides, Meperidine, and Penicillins    Medications:    Current Outpatient Medications:     alfuzosin (UROXATRAL) 10 mg Tb24, TAKE 1 TABLET (10 MG TOTAL) BY MOUTH DAILY WITH BREAKFAST., Disp: 90 tablet, Rfl: 3    amlodipine-benazepril 5-20 mg (LOTREL) 5-20 mg per capsule, TAKE 1  CAPSULE BY MOUTH EVERY DAY, Disp: 90 capsule, Rfl: 3    aspirin (ECOTRIN) 81 MG EC tablet, Take 81 mg by mouth once daily., Disp: , Rfl:     HYDROcodone-acetaminophen (NORCO) 7.5-325 mg per tablet, 1 tablet PO TID prn severe pain, Disp: 90 tablet, Rfl: 0    hydrocortisone (ANUSOL-HC) 25 mg suppository, , Disp: , Rfl:     mirabegron (MYRBETRIQ) 25 mg Tb24 ER tablet, Take 1 tablet (25 mg total) by mouth once daily., Disp: 30 tablet, Rfl: 11    simvastatin (ZOCOR) 20 MG tablet, TAKE 1 TABLET BY MOUTH EVERY DAY IN THE EVENING, Disp: 90 tablet, Rfl: 3    traZODone (DESYREL) 50 MG tablet, TAKE 1 TABLET BY MOUTH NIGHTLY AS NEEDED FOR INSOMNIA., Disp: 90 tablet, Rfl: 2    PHYSICAL EXAMINATION:    Constitutional: He is oriented to person, place, and time. He appears well-developed and well-nourished.  He is in no apparent distress.    Neck: Normal ROM.     Cardiovascular: Normal rate.      Pulmonary/Chest: Effort normal. No respiratory distress.     Abdominal:  He exhibits no distension.  There is no CVA tenderness.     Neurological: He is alert and oriented to person, place, and time.     Skin: Skin is warm and dry.     Psych: Cooperative with normal affect.    Physical Exam      LABS:    Lab Results   Component Value Date    PSA 3.69 06/11/2013    PSA 3.75 12/07/2012    PSA 4.91 (H) 09/26/2012    PSADIAG 4.1 (H) 01/14/2021    PSADIAG 3.7 01/06/2020    PSADIAG 5.9 (H) 10/25/2019     Lab Results   Component Value Date    CREATININE 0.8 05/09/2023         IMPRESSION:    Encounter Diagnoses   Name Primary?    BPH with obstruction/lower urinary tract symptoms Yes    Urinary frequency        PLAN:  -Discussed increasing myrbetriq to 50 mg to see if that improves frequency. He will call insurance regarding cost of myrbetriq 50 mg to make sure not more expensive  If too expensive, he will stay on myrbetriq 25 mg.  Will notify me via portal with which dosage to refill    Pt is not interested in cysto/TRUS and would like to continue  on medication.    -Continue uroaxatral as ordered. No refills needed    -Discussed conservative measures to control urgency and frequency including   1. Avoiding/minimizing bladder irritants (see below), especially in afternoon and evening hours    Discussed bladder irritants include coffe (even decaf), tea, alcohol, soda, spicy foods, acidic juices (orange, tomato), vinegar, and artificial sweeteners/sugary beverages.    2. timed voiding - empty on a schedule (approx ~2-3 hours) in spite of need to urinate, to get ahead of urge    3. dont postponing voiding - dont hold it on purpose     4. bowel regimen as distended bowel has extrinsic compressive effect on bladder.   - any or all of the following in any combination, titrate to soft daily bowel movement without pushing or straining  - colace/stool softener capsule - once to twice daily  - miralax - 1 capful daily to start, can increase to 2x daily (or decrease to 1/2 cap daily)  - increase dietary fibery  - fiber supplements, such as metamucil  - prunes, prune juice    5. INCREASE water intake    6. Stop fluids 2 hours before bed, and urinate just before bed    -RTC 6 months      I encouraged him or any of his family members to call or email me with questions and/or concerns.      30 minutes of total time spent on the encounter, which includes face to face time and non-face to face time preparing to see the patient (eg, review of tests), Obtaining and/or reviewing separately obtained history, Documenting clinical information in the electronic or other health record, Independently interpreting results (not separately reported) and communicating results to the patient/family/caregiver, or Care coordination (not separately reported).

## 2023-07-31 NOTE — PATIENT INSTRUCTIONS
Ask pharmacy or insurance regarding cost for 30 day supply vs 90 day supply  And also increase myrbetriq to 50 mg    Discussed conservative measures to control urgency and frequency including   1. Avoiding/minimizing bladder irritants (see below), especially in afternoon and evening hours    Discussed bladder irritants include coffe (even decaf), tea, alcohol, soda, spicy foods, acidic juices (orange, tomato), vinegar, and artificial sweeteners/sugary beverages.    2. timed voiding - empty on a schedule (approx ~2-3 hours) in spite of need to urinate, to get ahead of urge    3. dont postponing voiding - dont hold it on purpose     4. bowel regimen as distended bowel has extrinsic compressive effect on bladder.   - any or all of the following in any combination, titrate to soft daily bowel movement without pushing or straining  - colace/stool softener capsule - once to twice daily  - miralax - 1 capful daily to start, can increase to 2x daily (or decrease to 1/2 cap daily)  - increase dietary fibery  - fiber supplements, such as metamucil  - prunes, prune juice    5. INCREASE water intake    6. Stop fluids 2 hours before bed, and urinate just before bed

## 2023-08-02 ENCOUNTER — HOSPITAL ENCOUNTER (OUTPATIENT)
Facility: HOSPITAL | Age: 83
Discharge: HOME | End: 2023-08-02
Attending: INTERNAL MEDICINE | Admitting: STUDENT IN AN ORGANIZED HEALTH CARE EDUCATION/TRAINING PROGRAM
Payer: MEDICARE

## 2023-08-02 ENCOUNTER — ANESTHESIA (OUTPATIENT)
Dept: ENDOSCOPY | Facility: HOSPITAL | Age: 83
End: 2023-08-02
Payer: MEDICARE

## 2023-08-02 ENCOUNTER — ANESTHESIA EVENT (OUTPATIENT)
Dept: ENDOSCOPY | Facility: HOSPITAL | Age: 83
End: 2023-08-02
Payer: MEDICARE

## 2023-08-02 DIAGNOSIS — Z86.010 HISTORY OF COLON POLYPS: ICD-10-CM

## 2023-08-02 DIAGNOSIS — K63.5 POLYP OF COLON, UNSPECIFIED PART OF COLON, UNSPECIFIED TYPE: Primary | ICD-10-CM

## 2023-08-02 DIAGNOSIS — K57.90 DIVERTICULOSIS: ICD-10-CM

## 2023-08-02 DIAGNOSIS — K64.8 INTERNAL HEMORRHOIDS: ICD-10-CM

## 2023-08-02 PROCEDURE — 25000003 PHARM REV CODE 250: Performed by: INTERNAL MEDICINE

## 2023-08-02 PROCEDURE — 27201012 HC FORCEPS, HOT/COLD, DISP: Performed by: INTERNAL MEDICINE

## 2023-08-02 PROCEDURE — 45385 COLONOSCOPY W/LESION REMOVAL: CPT | Mod: PT | Performed by: INTERNAL MEDICINE

## 2023-08-02 PROCEDURE — 88305 TISSUE EXAM BY PATHOLOGIST: CPT | Mod: TC,59 | Performed by: PATHOLOGY

## 2023-08-02 PROCEDURE — 45380 COLONOSCOPY AND BIOPSY: CPT | Mod: PT,59 | Performed by: INTERNAL MEDICINE

## 2023-08-02 PROCEDURE — D9220A PRA ANESTHESIA: Mod: PT,ANES,, | Performed by: ANESTHESIOLOGY

## 2023-08-02 PROCEDURE — D9220A PRA ANESTHESIA: Mod: PT,CRNA,, | Performed by: NURSE ANESTHETIST, CERTIFIED REGISTERED

## 2023-08-02 PROCEDURE — 63600175 PHARM REV CODE 636 W HCPCS: Performed by: NURSE ANESTHETIST, CERTIFIED REGISTERED

## 2023-08-02 PROCEDURE — 45385 PR COLONOSCOPY,REMV LESN,SNARE: ICD-10-PCS | Mod: PT,,, | Performed by: INTERNAL MEDICINE

## 2023-08-02 PROCEDURE — 45380 PR COLONOSCOPY,BIOPSY: ICD-10-PCS | Mod: PT,59,, | Performed by: INTERNAL MEDICINE

## 2023-08-02 PROCEDURE — D9220A PRA ANESTHESIA: ICD-10-PCS | Mod: PT,ANES,, | Performed by: ANESTHESIOLOGY

## 2023-08-02 PROCEDURE — 45385 COLONOSCOPY W/LESION REMOVAL: CPT | Mod: PT,,, | Performed by: INTERNAL MEDICINE

## 2023-08-02 PROCEDURE — 37000008 HC ANESTHESIA 1ST 15 MINUTES: Performed by: INTERNAL MEDICINE

## 2023-08-02 PROCEDURE — 37000009 HC ANESTHESIA EA ADD 15 MINS: Performed by: INTERNAL MEDICINE

## 2023-08-02 PROCEDURE — 45380 COLONOSCOPY AND BIOPSY: CPT | Mod: PT,59,, | Performed by: INTERNAL MEDICINE

## 2023-08-02 PROCEDURE — 25000003 PHARM REV CODE 250: Performed by: NURSE ANESTHETIST, CERTIFIED REGISTERED

## 2023-08-02 PROCEDURE — D9220A PRA ANESTHESIA: ICD-10-PCS | Mod: PT,CRNA,, | Performed by: NURSE ANESTHETIST, CERTIFIED REGISTERED

## 2023-08-02 RX ORDER — PROPOFOL 10 MG/ML
VIAL (ML) INTRAVENOUS
Status: DISCONTINUED | OUTPATIENT
Start: 2023-08-02 | End: 2023-08-02

## 2023-08-02 RX ORDER — SODIUM CHLORIDE 9 MG/ML
INJECTION, SOLUTION INTRAVENOUS CONTINUOUS
Status: DISCONTINUED | OUTPATIENT
Start: 2023-08-02 | End: 2023-08-02 | Stop reason: HOSPADM

## 2023-08-02 RX ORDER — HYDROCORTISONE ACETATE 25 MG/1
25 SUPPOSITORY RECTAL 2 TIMES DAILY PRN
Qty: 60 SUPPOSITORY | Refills: 1 | Status: SHIPPED | OUTPATIENT
Start: 2023-08-02 | End: 2023-09-01

## 2023-08-02 RX ORDER — LIDOCAINE HYDROCHLORIDE 20 MG/ML
INJECTION, SOLUTION EPIDURAL; INFILTRATION; INTRACAUDAL; PERINEURAL
Status: DISCONTINUED | OUTPATIENT
Start: 2023-08-02 | End: 2023-08-02

## 2023-08-02 RX ADMIN — SODIUM CHLORIDE: 9 INJECTION, SOLUTION INTRAVENOUS at 07:08

## 2023-08-02 RX ADMIN — SODIUM CHLORIDE: 0.9 INJECTION, SOLUTION INTRAVENOUS at 08:08

## 2023-08-02 RX ADMIN — PROPOFOL 50 MG: 10 INJECTION, EMULSION INTRAVENOUS at 08:08

## 2023-08-02 RX ADMIN — PROPOFOL 100 MG: 10 INJECTION, EMULSION INTRAVENOUS at 08:08

## 2023-08-02 RX ADMIN — LIDOCAINE HYDROCHLORIDE 100 MG: 20 INJECTION, SOLUTION EPIDURAL; INFILTRATION; INTRACAUDAL; PERINEURAL at 08:08

## 2023-08-02 NOTE — ANESTHESIA PREPROCEDURE EVALUATION
08/02/2023  Tha Guzman is a 83 y.o., male.      Pre-op Assessment    I have reviewed the Patient Summary Reports.     I have reviewed the Nursing Notes. I have reviewed the NPO Status.   I have reviewed the Medications.     Review of Systems  Anesthesia Hx:  Denies Family Hx of Anesthesia complications.   Denies Personal Hx of Anesthesia complications.   Cardiovascular:   Hypertension PVD hyperlipidemia    Renal/:   BPH    Hepatic/GI:   Bowel Prep.    Musculoskeletal:   Arthritis         Physical Exam    Airway:  Mallampati: II   Mouth Opening: Normal  TM Distance: Normal  Tongue: Normal        Anesthesia Plan  Type of Anesthesia, risks & benefits discussed:    Anesthesia Type: Gen Natural Airway  Intra-op Monitoring Plan: Standard ASA Monitors  Induction:  IV  Informed Consent: Informed consent signed with the Patient and all parties understand the risks and agree with anesthesia plan.  All questions answered.   ASA Score: 2    Ready For Surgery From Anesthesia Perspective.     .

## 2023-08-02 NOTE — H&P
CC: History of colon polyps    83 year old male with above. States that symptoms are absent, no alleviating/exacerbating factors. No family history of colorectal CA. Positive personal history of polyps. No bleeding or weight loss.     ROS:  No headache, no fever/chills, no chest pain/SOB, no nausea/vomiting/diarrhea/constipation/GI bleeding/abdominal pain, no dysuria/hematuria.    VSSAF   Exam:   Alert and oriented x 3; no apparent distress   PERRLA, sclera anicteric  CV: Regular rate/rhythm, normal PMI   Lungs: Clear bilaterally with no wheeze/rales   Abdomen: Soft, NT/ND, normal bowel sounds   Ext: No cyanosis, clubbing     Impression:   As above    Plan:   Proceed with endoscopy. Further recs to follow.

## 2023-08-02 NOTE — PROVATION PATIENT INSTRUCTIONS
Discharge Summary/Instructions after an Endoscopic Procedure  Patient Name: Tha Guzman  Patient MRN: [id number]  Patient YOB: 1940  Wednesday, August 2, 2023  Dominick Rivera MD  Dear patient,  As a result of recent federal legislation (The Federal Cures Act), you may   receive lab or pathology results from your procedure in your [facility]   account before your physician is able to contact you. Your physician or   their representative will relay the results to you with their   recommendations at their soonest availability.  Thank you,  RESTRICTIONS:  During your procedure today, you received medications for sedation.  These   medications may affect your judgment, balance and coordination.  Therefore,   for 24 hours, you have the following restrictions:   - DO NOT drive a car, operate machinery, make legal/financial decisions,   sign important papers or drink alcohol.    ACTIVITY:  Today: no heavy lifting, straining or running due to procedural   sedation/anesthesia.  The following day: return to full activity including work.  DIET:  Eat and drink normally unless instructed otherwise.     TREATMENT FOR COMMON SIDE EFFECTS:  - Mild abdominal pain, nausea, belching, bloating or excessive gas:  rest,   eat lightly and use a heating pad.  - Sore Throat: treat with throat lozenges and/or gargle with warm salt   water.  - Because air was used during the procedure, expelling large amounts of air   from your rectum or belching is normal.  - If a bowel prep was taken, you may not have a bowel movement for 1-3 days.    This is normal.  SYMPTOMS TO WATCH FOR AND REPORT TO YOUR PHYSICIAN:  1. Abdominal pain or bloating, other than gas cramps.  2. Chest pain.  3. Back pain.  4. Signs of infection such as: chills or fever occurring within 24 hours   after the procedure.  5. Rectal bleeding, which would show as bright red, maroon, or black stools.   (A tablespoon of blood from the rectum is not serious, especially  if   hemorrhoids are present.)  6. Vomiting.  7. Weakness or dizziness.  GO DIRECTLY TO THE NEAREST EMERGENCY ROOM IF YOU HAVE ANY OF THE FOLLOWING:      Difficulty breathing              Chills and/or fever over 101 F   Persistent vomiting and/or vomiting blood   Severe abdominal pain   Severe chest pain   Black, tarry stools   Bleeding- more than one tablespoon   Any other symptom or condition that you feel may need urgent attention  Your doctor recommends these additional instructions:  If any biopsies were taken, your doctors clinic will contact you in 1 to 2   weeks with any results.  - Patient has a contact number available for emergencies.  The signs and   symptoms of potential delayed complications were discussed with the   patient.  Return to normal activities tomorrow.  Written discharge   instructions were provided to the patient.   - High fiber diet.   - Continue present medications.   - Await pathology results.   - Discharge patient to home (ambulatory).   - Return to GI office PRN.   - No repeat colonoscopy due to age.  For questions, problems or results please call your physician - Dominick Rivera MD at Work:  [phone].  [facility] PHONE NUMBER: [phone]  IF A COMPLICATION OR EMERGENCY SITUATION ARISES AND YOU ARE UNABLE TO REACH   YOUR PHYSICIAN - GO DIRECTLY TO THE EMERGENCY ROOM.  Dominick Rivera MD  8/2/2023 8:36:55 AM  This report has been verified and signed electronically.  Dear patient,  As a result of recent federal legislation (The Federal Cures Act), you may   receive lab or pathology results from your procedure in your [facility]   account before your physician is able to contact you. Your physician or   their representative will relay the results to you with their   recommendations at their soonest availability.  Thank you,  PROVATION

## 2023-08-02 NOTE — PLAN OF CARE
Vss, venancio po fluids, denies pain, ambulates easily. IV removed, catheter intact. Discharge instructions provided and states understanding. States ready to go home.  Discharged from facility with family per wheelchair.

## 2023-08-02 NOTE — TRANSFER OF CARE
Anesthesia Transfer of Care Note    Patient: Tha Guzman    Procedure(s) Performed: Procedure(s) (LRB):  COLONOSCOPY (N/A)    Patient location: PACU    Anesthesia Type: general    Transport from OR: Transported from OR on room air with adequate spontaneous ventilation    Post pain: adequate analgesia    Post assessment: no apparent anesthetic complications    Post vital signs: stable    Level of consciousness: awake    Nausea/Vomiting: no nausea/vomiting    Complications: none    Transfer of care protocol was followed      Last vitals:   Visit Vitals  BP (!) 145/86 (BP Location: Left arm, Patient Position: Lying)   Pulse 64   Temp 36.6 °C (97.9 °F) (Skin)   Resp 18   Wt 72.6 kg (160 lb)   SpO2 (!) 94%   BMI 28.34 kg/m²

## 2023-08-02 NOTE — ANESTHESIA POSTPROCEDURE EVALUATION
Anesthesia Post Evaluation    Patient: Tha Guzman    Procedure(s) Performed: Procedure(s) (LRB):  COLONOSCOPY (N/A)    Final Anesthesia Type: general      Patient location during evaluation: PACU  Patient participation: Yes- Able to Participate  Level of consciousness: awake and alert  Post-procedure vital signs: reviewed and stable  Pain management: adequate  Airway patency: patent    PONV status at discharge: No PONV  Anesthetic complications: no      Cardiovascular status: blood pressure returned to baseline  Respiratory status: unassisted  Hydration status: euvolemic  Follow-up not needed.          Vitals Value Taken Time   /77 08/02/23 0855   Temp 36.8 °C (98.2 °F) 08/02/23 0855   Pulse 75 08/02/23 0855   Resp 16 08/02/23 0855   SpO2 100 % 08/02/23 0855         Event Time   Out of Recovery 09:15:00         Pain/Nadir Score: Nadir Score: 10 (8/2/2023  8:55 AM)

## 2023-08-03 VITALS
HEART RATE: 75 BPM | BODY MASS INDEX: 28.34 KG/M2 | OXYGEN SATURATION: 100 % | TEMPERATURE: 98 F | WEIGHT: 160 LBS | SYSTOLIC BLOOD PRESSURE: 127 MMHG | DIASTOLIC BLOOD PRESSURE: 77 MMHG | RESPIRATION RATE: 16 BRPM

## 2023-08-04 NOTE — PROGRESS NOTES
Please advise patient that polyp pathology was reviewed and is benign and is the adenomatous type which is precancerous/risk factor for cancer.  Repeat colonoscopy not recommended due to age.

## 2023-08-23 ENCOUNTER — PATIENT MESSAGE (OUTPATIENT)
Dept: FAMILY MEDICINE | Facility: CLINIC | Age: 83
End: 2023-08-23
Payer: MEDICARE

## 2023-10-05 ENCOUNTER — PATIENT MESSAGE (OUTPATIENT)
Dept: UROLOGY | Facility: CLINIC | Age: 83
End: 2023-10-05
Payer: MEDICARE

## 2023-10-09 ENCOUNTER — OFFICE VISIT (OUTPATIENT)
Dept: UROLOGY | Facility: CLINIC | Age: 83
End: 2023-10-09
Payer: MEDICARE

## 2023-10-09 VITALS — HEIGHT: 63 IN | WEIGHT: 160.06 LBS | BODY MASS INDEX: 28.36 KG/M2 | RESPIRATION RATE: 15 BRPM

## 2023-10-09 DIAGNOSIS — R30.0 DYSURIA: Primary | ICD-10-CM

## 2023-10-09 LAB
BILIRUBIN, UA POC OHS: NEGATIVE
BLOOD, UA POC OHS: NEGATIVE
CLARITY, UA POC OHS: CLEAR
COLOR, UA POC OHS: YELLOW
GLUCOSE, UA POC OHS: NEGATIVE
KETONES, UA POC OHS: NEGATIVE
LEUKOCYTES, UA POC OHS: NEGATIVE
NITRITE, UA POC OHS: NEGATIVE
PH, UA POC OHS: 6
POC RESIDUAL URINE VOLUME: 201 ML (ref 0–100)
PROTEIN, UA POC OHS: NEGATIVE
SPECIFIC GRAVITY, UA POC OHS: 1.01
UROBILINOGEN, UA POC OHS: 0.2

## 2023-10-09 PROCEDURE — 3288F PR FALLS RISK ASSESSMENT DOCUMENTED: ICD-10-PCS | Mod: CPTII,S$GLB,, | Performed by: NURSE PRACTITIONER

## 2023-10-09 PROCEDURE — 1160F RVW MEDS BY RX/DR IN RCRD: CPT | Mod: CPTII,S$GLB,, | Performed by: NURSE PRACTITIONER

## 2023-10-09 PROCEDURE — 81003 POCT URINALYSIS(INSTRUMENT): ICD-10-PCS | Mod: QW,S$GLB,, | Performed by: NURSE PRACTITIONER

## 2023-10-09 PROCEDURE — 1126F AMNT PAIN NOTED NONE PRSNT: CPT | Mod: CPTII,S$GLB,, | Performed by: NURSE PRACTITIONER

## 2023-10-09 PROCEDURE — 51798 POCT BLADDER SCAN: ICD-10-PCS | Mod: S$GLB,,, | Performed by: NURSE PRACTITIONER

## 2023-10-09 PROCEDURE — 99999 PR PBB SHADOW E&M-EST. PATIENT-LVL IV: ICD-10-PCS | Mod: PBBFAC,,, | Performed by: NURSE PRACTITIONER

## 2023-10-09 PROCEDURE — 1126F PR PAIN SEVERITY QUANTIFIED, NO PAIN PRESENT: ICD-10-PCS | Mod: CPTII,S$GLB,, | Performed by: NURSE PRACTITIONER

## 2023-10-09 PROCEDURE — 1159F MED LIST DOCD IN RCRD: CPT | Mod: CPTII,S$GLB,, | Performed by: NURSE PRACTITIONER

## 2023-10-09 PROCEDURE — 1160F PR REVIEW ALL MEDS BY PRESCRIBER/CLIN PHARMACIST DOCUMENTED: ICD-10-PCS | Mod: CPTII,S$GLB,, | Performed by: NURSE PRACTITIONER

## 2023-10-09 PROCEDURE — 99999 PR PBB SHADOW E&M-EST. PATIENT-LVL IV: CPT | Mod: PBBFAC,,, | Performed by: NURSE PRACTITIONER

## 2023-10-09 PROCEDURE — 99214 PR OFFICE/OUTPT VISIT, EST, LEVL IV, 30-39 MIN: ICD-10-PCS | Mod: S$GLB,,, | Performed by: NURSE PRACTITIONER

## 2023-10-09 PROCEDURE — 51798 US URINE CAPACITY MEASURE: CPT | Mod: S$GLB,,, | Performed by: NURSE PRACTITIONER

## 2023-10-09 PROCEDURE — 1159F PR MEDICATION LIST DOCUMENTED IN MEDICAL RECORD: ICD-10-PCS | Mod: CPTII,S$GLB,, | Performed by: NURSE PRACTITIONER

## 2023-10-09 PROCEDURE — 1101F PT FALLS ASSESS-DOCD LE1/YR: CPT | Mod: CPTII,S$GLB,, | Performed by: NURSE PRACTITIONER

## 2023-10-09 PROCEDURE — 99214 OFFICE O/P EST MOD 30 MIN: CPT | Mod: S$GLB,,, | Performed by: NURSE PRACTITIONER

## 2023-10-09 PROCEDURE — 81003 URINALYSIS AUTO W/O SCOPE: CPT | Mod: QW,S$GLB,, | Performed by: NURSE PRACTITIONER

## 2023-10-09 PROCEDURE — 1101F PR PT FALLS ASSESS DOC 0-1 FALLS W/OUT INJ PAST YR: ICD-10-PCS | Mod: CPTII,S$GLB,, | Performed by: NURSE PRACTITIONER

## 2023-10-09 PROCEDURE — 3288F FALL RISK ASSESSMENT DOCD: CPT | Mod: CPTII,S$GLB,, | Performed by: NURSE PRACTITIONER

## 2023-10-09 RX ORDER — SULFAMETHOXAZOLE AND TRIMETHOPRIM 800; 160 MG/1; MG/1
1 TABLET ORAL 2 TIMES DAILY
Qty: 28 TABLET | Refills: 0 | Status: SHIPPED | OUTPATIENT
Start: 2023-10-09 | End: 2023-10-23

## 2023-10-09 NOTE — PATIENT INSTRUCTIONS
Continue uroxatral and myrbetriq as ordered    Discussed conservative measures to control urgency and frequency including   1. Avoiding/minimizing bladder irritants (see below), especially in afternoon and evening hours    Discussed bladder irritants include coffe (even decaf), tea, alcohol, soda, spicy foods, acidic juices (orange, tomato), vinegar, and artificial sweeteners/sugary beverages.    2. timed voiding - empty on a schedule (approx ~2-3 hours) in spite of need to urinate, to get ahead of urge    3. dont postponing voiding - dont hold it on purpose     4. bowel regimen as distended bowel has extrinsic compressive effect on bladder.   - any or all of the following in any combination, titrate to soft daily bowel movement without pushing or straining  - colace/stool softener capsule - once to twice daily  - miralax - 1 capful daily to start, can increase to 2x daily (or decrease to 1/2 cap daily)  - increase dietary fibery  - fiber supplements, such as metamucil  - prunes, prune juice    5. INCREASE water intake    6. Stop fluids 2 hours before bed, and urinate just before bed

## 2023-10-09 NOTE — PROGRESS NOTES
CHIEF COMPLAINT:    Mr. Guzman is a 83 y.o. male presenting for dysuria  PRESENTING ILLNESS:    Tha Guzman is a 83 y.o. male who presents for dysuria. Last clinic visit was 7/31/23.    10/9/23  Pt presents to clinic for dysuria, bladder and prostate discomfort that started a few days ago. He had this in the past and took bactrim which worked well for him. Denies gross hematuria, flank pain, fever, chills, nausea or vomiting. No testicle pain or scrotal swelling. No penile discharge.   Pt last voided 45 min ago, Bladder scan 201 ml. Pt able to void prior to leaving clinic to provide specimen. UA negative    He is taking uroxatral and myrbetriq for LUTS. No change to baseline voiding.     7/31/23  Patient was started on myrbetriq 25 mg last clinic visit. He continues to have frequency but no longer having leakage trying to get to the bathroom. He is also taking uroxatral. He is satisfied with results of myrbetriq. Denies dysuria, gross hematuria, flank pain, fever, chills, nausea or vomiting  PVR 62 ml    1/30/23  No longer taking Vesicare. Saw some effect on this however could not tolerate the dry mouth.   Frequency is still bothersome.   No gross hematuria.   Only on Uroxatral.      PVR 57 cc     7/25/22  Previously given trial of Vesicare 10 mg.   This has helped greatly. Frequency and incontinence are much better.   Side effects are manageable.   Still on the Finasteride and Uroxatral.      For the past week he has been having some slight dysuria and suprapubic discomfort.   Having regular bowel movements.      No further hematospermia.      UA today negative for blood, leuks, nitrite   PVR 84 cc     4/18/22  Patient was previously started on Finasteride. Has not noticed a big difference on this.   Urinating every 1.5-2 hours. He does have some urgency and mild urge incontinence.   Weak stream and straining to urinating.      No hematuria or dysuria.   Twice he has taken cipro twice for 5 days due to  "bothersome prostatitis symptoms.      PVR: 64 cc     10/14/21  Previous patient of Dr. Hawkins, last seen Jan 2021.  Hx of BPH for which he is on Uroxatral.   Hx of microscopic hematuria s/p negative workup in 2016.     Was seen by Gabrilela on 9/16 and was prescribed bactrim x 21 days for prostatitis. He takes that this was a "copper/brown discharge" in his underwear. This was not associated with urination.   At that time he was having some discomfort with sitting and bowel movements.   Has regular daily bowel movements.      No hematuria. Some discomfort with urination. No dysuria.   Has some urinary frequency q 2 hours. Stream is weak. Sits to urinate. Has some urgency and urge incontinence.      UA today: +trace blood, leuk and nitrite negative   PVR today: 74 cc     Last urine culture: no documented UTIs  PSA 1/14/21: 4.1       REVIEW OF SYSTEMS:    Review of Systems    Constitutional: Negative for fever and chills.   HENT: Negative for hearing loss.   Eyes: Negative for visual disturbance.   Respiratory: Negative for shortness of breath.   Cardiovascular: Negative for chest pain.   Gastrointestinal: Negative for nausea, vomiting.   Genitourinary:  See above  Neurological: Negative for dizziness.   Hematological: Does not bruise/bleed easily.   Psychiatric/Behavioral: Negative for confusion.       PATIENT HISTORY:    Past Medical History:   Diagnosis Date    Arthritis     Elevated PSA     Essential hypertension, benign     Mixed hyperlipidemia     Prostatitis, unspecified     Wears glasses        Past Surgical History:   Procedure Laterality Date    cataracts      COLONOSCOPY N/A 8/14/2019    Procedure: COLONOSCOPY;  Surgeon: Dominick Sr MD;  Location: [facility];  Service: Endoscopy;  Laterality: N/A;    COLONOSCOPY N/A 8/2/2023    Procedure: COLONOSCOPY;  Surgeon: Dominick Sr MD;  Location: [facility];  Service: Endoscopy;  Laterality: N/A;    HEMORRHOID SURGERY      KNEE ARTHROSCOPY W/ MENISCAL " REPAIR Left     TONSILLECTOMY         Family History   Problem Relation Age of Onset    Cancer Brother         lunga cancer    Cancer Paternal Uncle         prostate cancer    Melanoma Neg Hx     Psoriasis Neg Hx     Lupus Neg Hx     Eczema Neg Hx        Social History     Socioeconomic History    Marital status:    Tobacco Use    Smoking status: Former     Current packs/day: 0.00     Average packs/day: 1 pack/day for 40.0 years (40.0 ttl pk-yrs)     Types: Cigarettes     Start date: 1967     Quit date: 2007     Years since quittin.2     Passive exposure: Past    Smokeless tobacco: Never   Substance and Sexual Activity    Alcohol use: Yes     Alcohol/week: 5.0 standard drinks of alcohol     Types: 6 Standard drinks or equivalent per week     Comment: OCCASSIONALLY    Drug use: No    Sexual activity: Not Currently     Social Determinants of Health     Financial Resource Strain: Medium Risk (10/6/2023)    Overall Financial Resource Strain (CARDIA)     Difficulty of Paying Living Expenses: Somewhat hard   Food Insecurity: No Food Insecurity (10/6/2023)    Hunger Vital Sign     Worried About Running Out of Food in the Last Year: Never true     Ran Out of Food in the Last Year: Never true   Transportation Needs: No Transportation Needs (10/6/2023)    PRAPARE - Transportation     Lack of Transportation (Medical): No     Lack of Transportation (Non-Medical): No   Physical Activity: Sufficiently Active (10/6/2023)    Exercise Vital Sign     Days of Exercise per Week: 2 days     Minutes of Exercise per Session: 90 min   Stress: No Stress Concern Present (2023)    [employer] - Occupational Stress Questionnaire     Feeling of Stress : Not at all   Social Connections: Unknown (10/6/2023)    Social Connection and Isolation Panel [NHANES]     Frequency of Communication with Friends and Family: Three times a week     Frequency of Social Gatherings with Friends and Family:  Once a week     Active Member of Clubs or Organizations: No     Attends Club or Organization Meetings: Never     Marital Status:    Housing Stability: Low Risk  (10/6/2023)    Housing Stability Vital Sign     Unable to Pay for Housing in the Last Year: No     Number of Places Lived in the Last Year: 1     Unstable Housing in the Last Year: No       Allergies:  Aminoglycosides, Meperidine, and Penicillins    Medications:    Current Outpatient Medications:     alfuzosin (UROXATRAL) 10 mg Tb24, TAKE 1 TABLET (10 MG TOTAL) BY MOUTH DAILY WITH BREAKFAST., Disp: 90 tablet, Rfl: 3    amlodipine-benazepril 5-20 mg (LOTREL) 5-20 mg per capsule, TAKE 1 CAPSULE BY MOUTH EVERY DAY, Disp: 90 capsule, Rfl: 3    aspirin (ECOTRIN) 81 MG EC tablet, Take 81 mg by mouth once daily., Disp: , Rfl:     HYDROcodone-acetaminophen (NORCO) 7.5-325 mg per tablet, 1 tablet PO TID prn severe pain, Disp: 90 tablet, Rfl: 0    mirabegron (MYRBETRIQ) 25 mg Tb24 ER tablet, Take 1 tablet (25 mg total) by mouth once daily., Disp: 30 tablet, Rfl: 11    simvastatin (ZOCOR) 20 MG tablet, TAKE 1 TABLET BY MOUTH EVERY DAY IN THE EVENING, Disp: 90 tablet, Rfl: 3    traZODone (DESYREL) 50 MG tablet, TAKE 1 TABLET BY MOUTH NIGHTLY AS NEEDED FOR INSOMNIA., Disp: 90 tablet, Rfl: 2    PHYSICAL EXAMINATION:    Constitutional: He is oriented to person, place, and time. He appears well-developed and well-nourished.  He is in no apparent distress.    Neck: Normal ROM.     Cardiovascular: Normal rate.      Pulmonary/Chest: Effort normal. No respiratory distress.     Abdominal:  He exhibits no distension.  There is no CVA tenderness.     Neurological: He is alert and oriented to person, place, and time.     Skin: Skin is warm and dry.     Psych: Cooperative with normal affect.    Physical Exam      LABS:    Lab Results   Component Value Date    PSA 3.69 06/11/2013    PSA 3.75 12/07/2012    PSA 4.91 (H) 09/26/2012    PSADIAG 4.1 (H) 01/14/2021    PSADIAG 3.7  01/06/2020    PSADIAG 5.9 (H) 10/25/2019     Lab Results   Component Value Date    CREATININE 0.8 05/09/2023         IMPRESSION:    Encounter Diagnoses   Name Primary?    Dysuria Yes       PLAN:  -Start bactrim based on symptoms. No issues with bactrim in the past  Discussed side effects and indications for bactrim. Prescription sent to the pharmacy. Pt verbalized understanding. 5/9/23 creatinine 0.8 / GFR>60    -Continue uroxatral and myrbetriq as ordered. No refills needed.    -Continue conservative measures to control urgency and frequency including   1. Avoiding/minimizing bladder irritants (see below), especially in afternoon and evening hours  Discussed bladder irritants include coffe (even decaf), tea, alcohol, soda, spicy foods, acidic juices (orange, tomato), vinegar, and artificial sweeteners/sugary beverages.  2. timed voiding - empty on a schedule (approx ~2-3 hours) in spite of need to urinate, to get ahead of urge  3. dont postponing voiding - dont hold it on purpose   4. bowel regimen as distended bowel has extrinsic compressive effect on bladder.   - any or all of the following in any combination, titrate to soft daily bowel movement without pushing or straining  - colace/stool softener capsule - once to twice daily  - miralax - 1 capful daily to start, can increase to 2x daily (or decrease to 1/2 cap daily)  - increase dietary fibery  - fiber supplements, such as metamucil  - prunes, prune juice  5. INCREASE water intake  6. Stop fluids 2 hours before bed, and urinate just before bed    -RTC 4 weeks      I encouraged him or any of his family members to call or email me with questions and/or concerns.      30 minutes of total time spent on the encounter, which includes face to face time and non-face to face time preparing to see the patient (eg, review of tests), Obtaining and/or reviewing separately obtained history, Documenting clinical information in the electronic or other health record,  Independently interpreting results (not separately reported) and communicating results to the patient/family/caregiver, or Care coordination (not separately reported).

## 2023-11-06 ENCOUNTER — PATIENT MESSAGE (OUTPATIENT)
Dept: UROLOGY | Facility: CLINIC | Age: 83
End: 2023-11-06
Payer: MEDICARE

## 2023-11-08 ENCOUNTER — OFFICE VISIT (OUTPATIENT)
Dept: UROLOGY | Facility: CLINIC | Age: 83
End: 2023-11-08
Payer: MEDICARE

## 2023-11-08 VITALS — WEIGHT: 160.06 LBS | RESPIRATION RATE: 15 BRPM | HEIGHT: 63 IN | BODY MASS INDEX: 28.36 KG/M2

## 2023-11-08 DIAGNOSIS — N40.1 BPH WITH OBSTRUCTION/LOWER URINARY TRACT SYMPTOMS: Primary | ICD-10-CM

## 2023-11-08 DIAGNOSIS — R31.0 GROSS HEMATURIA: ICD-10-CM

## 2023-11-08 DIAGNOSIS — N13.8 BPH WITH OBSTRUCTION/LOWER URINARY TRACT SYMPTOMS: Primary | ICD-10-CM

## 2023-11-08 LAB
BILIRUBIN, UA POC OHS: NEGATIVE
BLOOD, UA POC OHS: NEGATIVE
CLARITY, UA POC OHS: CLEAR
COLOR, UA POC OHS: YELLOW
GLUCOSE, UA POC OHS: NEGATIVE
KETONES, UA POC OHS: NEGATIVE
LEUKOCYTES, UA POC OHS: NEGATIVE
NITRITE, UA POC OHS: NEGATIVE
PH, UA POC OHS: 7
PROTEIN, UA POC OHS: NEGATIVE
SPECIFIC GRAVITY, UA POC OHS: 1.02
UROBILINOGEN, UA POC OHS: 0.2

## 2023-11-08 PROCEDURE — 3288F FALL RISK ASSESSMENT DOCD: CPT | Mod: CPTII,,, | Performed by: NURSE PRACTITIONER

## 2023-11-08 PROCEDURE — 1160F PR REVIEW ALL MEDS BY PRESCRIBER/CLIN PHARMACIST DOCUMENTED: ICD-10-PCS | Mod: CPTII,,, | Performed by: NURSE PRACTITIONER

## 2023-11-08 PROCEDURE — 3288F PR FALLS RISK ASSESSMENT DOCUMENTED: ICD-10-PCS | Mod: CPTII,,, | Performed by: NURSE PRACTITIONER

## 2023-11-08 PROCEDURE — 1101F PT FALLS ASSESS-DOCD LE1/YR: CPT | Mod: CPTII,,, | Performed by: NURSE PRACTITIONER

## 2023-11-08 PROCEDURE — 99214 OFFICE O/P EST MOD 30 MIN: CPT | Mod: ,,, | Performed by: NURSE PRACTITIONER

## 2023-11-08 PROCEDURE — 99999 PR PBB SHADOW E&M-EST. PATIENT-LVL III: ICD-10-PCS | Mod: PBBFAC,,, | Performed by: NURSE PRACTITIONER

## 2023-11-08 PROCEDURE — 81003 URINALYSIS AUTO W/O SCOPE: CPT | Mod: QW,,, | Performed by: NURSE PRACTITIONER

## 2023-11-08 PROCEDURE — 1159F PR MEDICATION LIST DOCUMENTED IN MEDICAL RECORD: ICD-10-PCS | Mod: CPTII,,, | Performed by: NURSE PRACTITIONER

## 2023-11-08 PROCEDURE — 1101F PR PT FALLS ASSESS DOC 0-1 FALLS W/OUT INJ PAST YR: ICD-10-PCS | Mod: CPTII,,, | Performed by: NURSE PRACTITIONER

## 2023-11-08 PROCEDURE — 1126F PR PAIN SEVERITY QUANTIFIED, NO PAIN PRESENT: ICD-10-PCS | Mod: CPTII,,, | Performed by: NURSE PRACTITIONER

## 2023-11-08 PROCEDURE — 1159F MED LIST DOCD IN RCRD: CPT | Mod: CPTII,,, | Performed by: NURSE PRACTITIONER

## 2023-11-08 PROCEDURE — 87086 URINE CULTURE/COLONY COUNT: CPT | Performed by: NURSE PRACTITIONER

## 2023-11-08 PROCEDURE — 88112 CYTOPATH CELL ENHANCE TECH: CPT | Mod: 26,,, | Performed by: PATHOLOGY

## 2023-11-08 PROCEDURE — 1160F RVW MEDS BY RX/DR IN RCRD: CPT | Mod: CPTII,,, | Performed by: NURSE PRACTITIONER

## 2023-11-08 PROCEDURE — 88112 CYTOPATH CELL ENHANCE TECH: CPT | Performed by: PATHOLOGY

## 2023-11-08 PROCEDURE — 81003 POCT URINALYSIS(INSTRUMENT): ICD-10-PCS | Mod: QW,,, | Performed by: NURSE PRACTITIONER

## 2023-11-08 PROCEDURE — 1126F AMNT PAIN NOTED NONE PRSNT: CPT | Mod: CPTII,,, | Performed by: NURSE PRACTITIONER

## 2023-11-08 PROCEDURE — 88112 PR  CYTOPATH, CELL ENHANCE TECH: ICD-10-PCS | Mod: 26,,, | Performed by: PATHOLOGY

## 2023-11-08 PROCEDURE — 99214 PR OFFICE/OUTPT VISIT, EST, LEVL IV, 30-39 MIN: ICD-10-PCS | Mod: ,,, | Performed by: NURSE PRACTITIONER

## 2023-11-08 PROCEDURE — 99999 PR PBB SHADOW E&M-EST. PATIENT-LVL III: CPT | Mod: PBBFAC,,, | Performed by: NURSE PRACTITIONER

## 2023-11-08 RX ORDER — SULFAMETHOXAZOLE AND TRIMETHOPRIM 800; 160 MG/1; MG/1
1 TABLET ORAL 2 TIMES DAILY
Qty: 42 TABLET | Refills: 0 | Status: SHIPPED | OUTPATIENT
Start: 2023-11-08 | End: 2023-11-29

## 2023-11-08 NOTE — PROGRESS NOTES
CHIEF COMPLAINT:    Mr. Guzman is a 83 y.o. male presenting for hematuria  PRESENTING ILLNESS:    Tha Guzman is a 83 y.o. male who presents for hematuria. Last clinic visit was 10/9/23.    11/8/23  Pt completed course of bactrim as ordered after last visit.  Pt sent portal message 11/6 that he had small spots of blood in his underwear and prostate irritation. No further episodes of blood since. No dysuria, flank pain, fever, chills, nausea or vomiting.  UA negative today    10/9/23  Pt presents to clinic for dysuria, bladder and prostate discomfort that started a few days ago. He had this in the past and took bactrim which worked well for him. Denies gross hematuria, flank pain, fever, chills, nausea or vomiting. No testicle pain or scrotal swelling. No penile discharge.   Pt last voided 45 min ago, Bladder scan 201 ml. Pt able to void prior to leaving clinic to provide specimen. UA negative    He is taking uroxatral and myrbetriq for LUTS. No change to baseline voiding.     7/31/23  Patient was started on myrbetriq 25 mg last clinic visit. He continues to have frequency but no longer having leakage trying to get to the bathroom. He is also taking uroxatral. He is satisfied with results of myrbetriq. Denies dysuria, gross hematuria, flank pain, fever, chills, nausea or vomiting  PVR 62 ml    1/30/23  No longer taking Vesicare. Saw some effect on this however could not tolerate the dry mouth.   Frequency is still bothersome.   No gross hematuria.   Only on Uroxatral.      PVR 57 cc     7/25/22  Previously given trial of Vesicare 10 mg.   This has helped greatly. Frequency and incontinence are much better.   Side effects are manageable.   Still on the Finasteride and Uroxatral.      For the past week he has been having some slight dysuria and suprapubic discomfort.   Having regular bowel movements.      No further hematospermia.      UA today negative for blood, leuks, nitrite   PVR 84 cc     4/18/22  Patient  "was previously started on Finasteride. Has not noticed a big difference on this.   Urinating every 1.5-2 hours. He does have some urgency and mild urge incontinence.   Weak stream and straining to urinating.      No hematuria or dysuria.   Twice he has taken cipro twice for 5 days due to bothersome prostatitis symptoms.      PVR: 64 cc     10/14/21  Previous patient of Dr. Hawkins, last seen Jan 2021.  Hx of BPH for which he is on Uroxatral.   Hx of microscopic hematuria s/p negative workup in 2016.     Was seen by Gabriella on 9/16 and was prescribed bactrim x 21 days for prostatitis. He takes that this was a "copper/brown discharge" in his underwear. This was not associated with urination.   At that time he was having some discomfort with sitting and bowel movements.   Has regular daily bowel movements.      No hematuria. Some discomfort with urination. No dysuria.   Has some urinary frequency q 2 hours. Stream is weak. Sits to urinate. Has some urgency and urge incontinence.      UA today: +trace blood, leuk and nitrite negative   PVR today: 74 cc     Last urine culture: no documented UTIs  PSA 1/14/21: 4.1       REVIEW OF SYSTEMS:    Review of Systems    Constitutional: Negative for fever and chills.   HENT: Negative for hearing loss.   Eyes: Negative for visual disturbance.   Respiratory: Negative for shortness of breath.   Cardiovascular: Negative for chest pain.   Gastrointestinal: Negative for nausea, vomiting.   Genitourinary:  See above  Neurological: Negative for dizziness.   Hematological: Does not bruise/bleed easily.   Psychiatric/Behavioral: Negative for confusion.       PATIENT HISTORY:    Past Medical History:   Diagnosis Date    Arthritis     Elevated PSA     Essential hypertension, benign     Mixed hyperlipidemia     Prostatitis, unspecified     Wears glasses        Past Surgical History:   Procedure Laterality Date    cataracts      COLONOSCOPY N/A 8/14/2019    Procedure: COLONOSCOPY;  Surgeon: " Dominick Sr MD;  Location: [facility] ENDO;  Service: Endoscopy;  Laterality: N/A;    COLONOSCOPY N/A 2023    Procedure: COLONOSCOPY;  Surgeon: Dominick Sr MD;  Location: [facility] ENDO;  Service: Endoscopy;  Laterality: N/A;    HEMORRHOID SURGERY      KNEE ARTHROSCOPY W/ MENISCAL REPAIR Left     TONSILLECTOMY         Family History   Problem Relation Age of Onset    Cancer Brother         lunga cancer    Cancer Paternal Uncle         prostate cancer    Melanoma Neg Hx     Psoriasis Neg Hx     Lupus Neg Hx     Eczema Neg Hx        Social History     Socioeconomic History    Marital status:    Tobacco Use    Smoking status: Former     Current packs/day: 0.00     Average packs/day: 1 pack/day for 40.0 years (40.0 ttl pk-yrs)     Types: Cigarettes     Start date: 1967     Quit date: 2007     Years since quittin.3     Passive exposure: Past    Smokeless tobacco: Never   Substance and Sexual Activity    Alcohol use: Yes     Alcohol/week: 5.0 standard drinks of alcohol     Types: 6 Standard drinks or equivalent per week     Comment: OCCASSIONALLY    Drug use: No    Sexual activity: Not Currently     Social Determinants of Health     Financial Resource Strain: Low Risk  (2023)    Overall Financial Resource Strain (CARDIA)     Difficulty of Paying Living Expenses: Not hard at all   Recent Concern: Financial Resource Strain - Medium Risk (10/6/2023)    Overall Financial Resource Strain (CARDIA)     Difficulty of Paying Living Expenses: Somewhat hard   Food Insecurity: No Food Insecurity (2023)    Hunger Vital Sign     Worried About Running Out of Food in the Last Year: Never true     Ran Out of Food in the Last Year: Never true   Transportation Needs: No Transportation Needs (2023)    PRAPARE - Transportation     Lack of Transportation (Medical): No     Lack of Transportation (Non-Medical): No   Physical Activity: Insufficiently Active (2023)    Exercise Vital Sign     Days of  Exercise per Week: 1 day     Minutes of Exercise per Session: 20 min   Stress: No Stress Concern Present (11/7/2023)    [employer] - Occupational Stress Questionnaire     Feeling of Stress : Only a little   Social Connections: Unknown (11/7/2023)    Social Connection and Isolation Panel [NHANES]     Frequency of Communication with Friends and Family: Three times a week     Frequency of Social Gatherings with Friends and Family: Twice a week     Active Member of Clubs or Organizations: No     Attends Club or Organization Meetings: Never     Marital Status:    Housing Stability: Low Risk  (11/7/2023)    Housing Stability Vital Sign     Unable to Pay for Housing in the Last Year: No     Number of Places Lived in the Last Year: 1     Unstable Housing in the Last Year: No       Allergies:  Aminoglycosides, Meperidine, and Penicillins    Medications:    Current Outpatient Medications:     amlodipine-benazepril 5-20 mg (LOTREL) 5-20 mg per capsule, TAKE 1 CAPSULE BY MOUTH EVERY DAY, Disp: 90 capsule, Rfl: 3    aspirin (ECOTRIN) 81 MG EC tablet, Take 81 mg by mouth once daily., Disp: , Rfl:     HYDROcodone-acetaminophen (NORCO) 7.5-325 mg per tablet, 1 tablet PO TID prn severe pain, Disp: 90 tablet, Rfl: 0    mirabegron (MYRBETRIQ) 25 mg Tb24 ER tablet, Take 1 tablet (25 mg total) by mouth once daily., Disp: 30 tablet, Rfl: 11    simvastatin (ZOCOR) 20 MG tablet, TAKE 1 TABLET BY MOUTH EVERY DAY IN THE EVENING, Disp: 90 tablet, Rfl: 3    traZODone (DESYREL) 50 MG tablet, TAKE 1 TABLET BY MOUTH NIGHTLY AS NEEDED FOR INSOMNIA., Disp: 90 tablet, Rfl: 2    alfuzosin (UROXATRAL) 10 mg Tb24, TAKE 1 TABLET (10 MG TOTAL) BY MOUTH DAILY WITH BREAKFAST., Disp: 90 tablet, Rfl: 3    sulfamethoxazole-trimethoprim 800-160mg (BACTRIM DS) 800-160 mg Tab, Take 1 tablet by mouth 2 (two) times daily. for 21 days, Disp: 42 tablet, Rfl: 0    PHYSICAL EXAMINATION:    Constitutional: He is oriented to person,  place, and time. He appears well-developed and well-nourished.  He is in no apparent distress.    Neck: Normal ROM.     Cardiovascular: Normal rate.      Pulmonary/Chest: Effort normal. No respiratory distress.     Abdominal:  He exhibits no distension.  There is no CVA tenderness.     Neurological: He is alert and oriented to person, place, and time.     Skin: Skin is warm and dry.     Psych: Cooperative with normal affect.    Physical Exam      LABS:    Lab Results   Component Value Date    PSA 3.69 06/11/2013    PSA 3.75 12/07/2012    PSA 4.91 (H) 09/26/2012    PSADIAG 4.1 (H) 01/14/2021    PSADIAG 3.7 01/06/2020    PSADIAG 5.9 (H) 10/25/2019     Lab Results   Component Value Date    CREATININE 0.8 05/09/2023         IMPRESSION:    Encounter Diagnoses   Name Primary?    BPH with obstruction/lower urinary tract symptoms Yes    Gross hematuria        PLAN:  -discussed possible gross hematuria with blood in underwear.  Discussed CTU and cysto to further evaluate kidneys, ureters and bladder. He had work up in 2016 with Dr Hawkins per pt and wants to hold off on repeat work up. He request to treat with additional course of bactrim and if hematuria occurs again, will proceed with work up. Discussed importance of CT and cysto but pt prefers to hold off at this time.  -Urine sent for culture and cytology. Will call with results  -Discussed side effects and indications for bactrim. Prescription sent to the pharmacy. Pt verbalized understanding.  -Continue uroxatral and myrbetriq as ordered.  -RTC if symptoms persist      I encouraged him or any of his family members to call or email me with questions and/or concerns.      30 minutes of total time spent on the encounter, which includes face to face time and non-face to face time preparing to see the patient (eg, review of tests), Obtaining and/or reviewing separately obtained history, Documenting clinical information in the electronic or other health record, Independently  interpreting results (not separately reported) and communicating results to the patient/family/caregiver, or Care coordination (not separately reported).

## 2023-11-08 NOTE — PATIENT INSTRUCTIONS
Recommend CT urogram and cysto for blood in urine to further evaluate kidneys/bladder.  If you decide to proceed, please let me know.

## 2023-11-09 LAB
BACTERIA UR CULT: NO GROWTH
FINAL PATHOLOGIC DIAGNOSIS: NORMAL
Lab: NORMAL

## 2023-11-15 RX ORDER — ALFUZOSIN HYDROCHLORIDE 10 MG/1
10 TABLET, EXTENDED RELEASE ORAL
Qty: 90 TABLET | Refills: 3 | Status: SHIPPED | OUTPATIENT
Start: 2023-11-15 | End: 2024-11-14

## 2023-12-07 ENCOUNTER — OFFICE VISIT (OUTPATIENT)
Dept: FAMILY MEDICINE | Facility: CLINIC | Age: 83
End: 2023-12-07
Payer: MEDICARE

## 2023-12-07 VITALS
RESPIRATION RATE: 18 BRPM | SYSTOLIC BLOOD PRESSURE: 120 MMHG | BODY MASS INDEX: 28.52 KG/M2 | TEMPERATURE: 99 F | HEIGHT: 63 IN | WEIGHT: 160.94 LBS | OXYGEN SATURATION: 96 % | DIASTOLIC BLOOD PRESSURE: 80 MMHG | HEART RATE: 92 BPM

## 2023-12-07 DIAGNOSIS — G47.00 INSOMNIA, UNSPECIFIED TYPE: ICD-10-CM

## 2023-12-07 DIAGNOSIS — M17.0 PRIMARY OSTEOARTHRITIS OF BOTH KNEES: ICD-10-CM

## 2023-12-07 DIAGNOSIS — Z00.00 HEALTHCARE MAINTENANCE: Primary | ICD-10-CM

## 2023-12-07 DIAGNOSIS — Z23 NEED FOR VACCINATION: ICD-10-CM

## 2023-12-07 DIAGNOSIS — E78.2 MIXED HYPERLIPIDEMIA: ICD-10-CM

## 2023-12-07 DIAGNOSIS — I10 PRIMARY HYPERTENSION: ICD-10-CM

## 2023-12-07 PROCEDURE — G0008 ADMIN INFLUENZA VIRUS VAC: HCPCS | Mod: S$GLB,,, | Performed by: STUDENT IN AN ORGANIZED HEALTH CARE EDUCATION/TRAINING PROGRAM

## 2023-12-07 PROCEDURE — 3288F FALL RISK ASSESSMENT DOCD: CPT | Mod: CPTII,S$GLB,, | Performed by: STUDENT IN AN ORGANIZED HEALTH CARE EDUCATION/TRAINING PROGRAM

## 2023-12-07 PROCEDURE — 3074F SYST BP LT 130 MM HG: CPT | Mod: CPTII,S$GLB,, | Performed by: STUDENT IN AN ORGANIZED HEALTH CARE EDUCATION/TRAINING PROGRAM

## 2023-12-07 PROCEDURE — 1101F PR PT FALLS ASSESS DOC 0-1 FALLS W/OUT INJ PAST YR: ICD-10-PCS | Mod: CPTII,S$GLB,, | Performed by: STUDENT IN AN ORGANIZED HEALTH CARE EDUCATION/TRAINING PROGRAM

## 2023-12-07 PROCEDURE — 1101F PT FALLS ASSESS-DOCD LE1/YR: CPT | Mod: CPTII,S$GLB,, | Performed by: STUDENT IN AN ORGANIZED HEALTH CARE EDUCATION/TRAINING PROGRAM

## 2023-12-07 PROCEDURE — 3079F DIAST BP 80-89 MM HG: CPT | Mod: CPTII,S$GLB,, | Performed by: STUDENT IN AN ORGANIZED HEALTH CARE EDUCATION/TRAINING PROGRAM

## 2023-12-07 PROCEDURE — 99214 OFFICE O/P EST MOD 30 MIN: CPT | Mod: S$GLB,,, | Performed by: STUDENT IN AN ORGANIZED HEALTH CARE EDUCATION/TRAINING PROGRAM

## 2023-12-07 PROCEDURE — 3074F PR MOST RECENT SYSTOLIC BLOOD PRESSURE < 130 MM HG: ICD-10-PCS | Mod: CPTII,S$GLB,, | Performed by: STUDENT IN AN ORGANIZED HEALTH CARE EDUCATION/TRAINING PROGRAM

## 2023-12-07 PROCEDURE — 3079F PR MOST RECENT DIASTOLIC BLOOD PRESSURE 80-89 MM HG: ICD-10-PCS | Mod: CPTII,S$GLB,, | Performed by: STUDENT IN AN ORGANIZED HEALTH CARE EDUCATION/TRAINING PROGRAM

## 2023-12-07 PROCEDURE — 99214 PR OFFICE/OUTPT VISIT, EST, LEVL IV, 30-39 MIN: ICD-10-PCS | Mod: S$GLB,,, | Performed by: STUDENT IN AN ORGANIZED HEALTH CARE EDUCATION/TRAINING PROGRAM

## 2023-12-07 PROCEDURE — 99999 PR PBB SHADOW E&M-EST. PATIENT-LVL III: CPT | Mod: PBBFAC,,, | Performed by: STUDENT IN AN ORGANIZED HEALTH CARE EDUCATION/TRAINING PROGRAM

## 2023-12-07 PROCEDURE — 1126F AMNT PAIN NOTED NONE PRSNT: CPT | Mod: CPTII,S$GLB,, | Performed by: STUDENT IN AN ORGANIZED HEALTH CARE EDUCATION/TRAINING PROGRAM

## 2023-12-07 PROCEDURE — 90694 VACC AIIV4 NO PRSRV 0.5ML IM: CPT | Mod: S$GLB,,, | Performed by: STUDENT IN AN ORGANIZED HEALTH CARE EDUCATION/TRAINING PROGRAM

## 2023-12-07 PROCEDURE — 1159F PR MEDICATION LIST DOCUMENTED IN MEDICAL RECORD: ICD-10-PCS | Mod: CPTII,S$GLB,, | Performed by: STUDENT IN AN ORGANIZED HEALTH CARE EDUCATION/TRAINING PROGRAM

## 2023-12-07 PROCEDURE — 3288F PR FALLS RISK ASSESSMENT DOCUMENTED: ICD-10-PCS | Mod: CPTII,S$GLB,, | Performed by: STUDENT IN AN ORGANIZED HEALTH CARE EDUCATION/TRAINING PROGRAM

## 2023-12-07 PROCEDURE — 1126F PR PAIN SEVERITY QUANTIFIED, NO PAIN PRESENT: ICD-10-PCS | Mod: CPTII,S$GLB,, | Performed by: STUDENT IN AN ORGANIZED HEALTH CARE EDUCATION/TRAINING PROGRAM

## 2023-12-07 PROCEDURE — 99999 PR PBB SHADOW E&M-EST. PATIENT-LVL III: ICD-10-PCS | Mod: PBBFAC,,, | Performed by: STUDENT IN AN ORGANIZED HEALTH CARE EDUCATION/TRAINING PROGRAM

## 2023-12-07 PROCEDURE — G0008 FLU VACCINE - QUADRIVALENT - ADJUVANTED: ICD-10-PCS | Mod: S$GLB,,, | Performed by: STUDENT IN AN ORGANIZED HEALTH CARE EDUCATION/TRAINING PROGRAM

## 2023-12-07 PROCEDURE — 1159F MED LIST DOCD IN RCRD: CPT | Mod: CPTII,S$GLB,, | Performed by: STUDENT IN AN ORGANIZED HEALTH CARE EDUCATION/TRAINING PROGRAM

## 2023-12-07 PROCEDURE — 90694 FLU VACCINE - QUADRIVALENT - ADJUVANTED: ICD-10-PCS | Mod: S$GLB,,, | Performed by: STUDENT IN AN ORGANIZED HEALTH CARE EDUCATION/TRAINING PROGRAM

## 2023-12-07 RX ORDER — TRAZODONE HYDROCHLORIDE 100 MG/1
100 TABLET ORAL NIGHTLY PRN
Qty: 90 TABLET | Refills: 3 | Status: SHIPPED | OUTPATIENT
Start: 2023-12-07

## 2023-12-07 RX ORDER — HYDROCODONE BITARTRATE AND ACETAMINOPHEN 7.5; 325 MG/1; MG/1
TABLET ORAL
Qty: 90 TABLET | Refills: 0 | Status: SHIPPED | OUTPATIENT
Start: 2023-12-07

## 2023-12-07 NOTE — PROGRESS NOTES
[facility] Note    Subjective:    The HPI and pertinent ROS is included in the Diagnostic Impression Remarks section at the end of the note. Please see below for further details. Chief complaint is at end of note.     Tha is a pleasant intelligent patient who is here for evaluation.     Modified Medications    Modified Medication Previous Medication    HYDROCODONE-ACETAMINOPHEN (NORCO) 7.5-325 MG PER TABLET HYDROcodone-acetaminophen (NORCO) 7.5-325 mg per tablet       1 tablet PO TID prn severe pain    1 tablet PO TID prn severe pain       Data reviewed 274}  Previous medical records reviewed and summarized in plan section at end of note.      If you are due for any health screening(s) below please notify me so we can arrange them to be ordered and scheduled. Most healthy patients at your age complete them, but you are free to accept or refuse. If you can't do it, I'll definitely understand. If you can, I'd certainly appreciate it!     All of your core healthy metrics are met.      The following portions of the patient's history were reviewed and updated as appropriate: allergies, current medications, past family history, past medical history, past social history, past surgical history and problem list.    He  has a past medical history of Arthritis, Elevated PSA, Essential hypertension, benign, Mixed hyperlipidemia, Prostatitis, unspecified, and Wears glasses.  He  has a past surgical history that includes Knee arthroscopy w/ meniscal repair (Left); Tonsillectomy; Hemorrhoid surgery; cataracts; Colonoscopy (N/A, 8/14/2019); and Colonoscopy (N/A, 8/2/2023).    He  reports that he quit smoking about 16 years ago. His smoking use included cigarettes. He started smoking about 56 years ago. He has a 40.0 pack-year smoking history. He has been exposed to tobacco smoke. He has never used smokeless tobacco. He reports current alcohol use of about 5.0 standard drinks of alcohol per week. He reports that he  "does not use drugs.  He family history includes Cancer in his brother and paternal uncle.    Review of patient's allergies indicates:   Allergen Reactions    Aminoglycosides Other (See Comments)     Lips swelled    Meperidine Itching    Penicillins Itching       Tobacco Use: Medium Risk (12/7/2023)    Patient History     Smoking Tobacco Use: Former     Smokeless Tobacco Use: Never     Passive Exposure: Past     Physical Examination  General appearance: alert, cooperative, no distress  Neck: no thyromegaly, no neck stiffness  Lungs: clear to auscultation, no wheezes, rales or rhonchi, symmetric air entry  Heart: normal rate, regular rhythm, normal S1, S2, no murmurs, rubs, clicks or gallops  Abdomen: soft, nontender, nondistended, no rigidity, rebound, or guarding.   Back: no point tenderness over spine  Extremities: peripheral pulses normal, no unilateral leg swelling or calf tenderness   Neurological:alert, oriented, normal speech, no new focal findings or movement disorder noted from baseline    BP Readings from Last 3 Encounters:   12/07/23 120/80   08/02/23 127/77   05/09/23 112/78     Wt Readings from Last 3 Encounters:   12/07/23 73 kg (160 lb 15 oz)   11/08/23 72.6 kg (160 lb 0.9 oz)   10/09/23 72.6 kg (160 lb 0.9 oz)     /80 (BP Location: Right arm, Patient Position: Sitting, BP Method: Large (Manual))   Pulse 92   Temp 98.6 °F (37 °C) (Oral)   Resp 18   Ht 5' 3" (1.6 m)   Wt 73 kg (160 lb 15 oz)   SpO2 96%   BMI 28.51 kg/m²    274}  Laboratory: I have reviewed old labs below:    274}    Lab Results   Component Value Date    WBC 4.94 05/09/2023    HGB 14.5 05/09/2023    HCT 42.6 05/09/2023    MCV 90 05/09/2023     05/09/2023     05/09/2023    K 4.0 05/09/2023     05/09/2023    CALCIUM 9.8 05/09/2023    CO2 24 05/09/2023    GLU 99 05/09/2023    BUN 16 05/09/2023    CREATININE 0.8 05/09/2023    EGFRNORACEVR >60.0 05/09/2023    ANIONGAP 8 05/09/2023    PROT 7.1 05/09/2023    " "ALBUMIN 4.0 05/09/2023    BILITOT 0.7 05/09/2023    ALKPHOS 48 (L) 05/09/2023    ALT 15 05/09/2023    AST 26 05/09/2023    INR 1.0 04/06/2016    CHOL 155 05/09/2023    TRIG 85 05/09/2023    HDL 51 05/09/2023    LDLCALC 87.0 05/09/2023    TSH 1.255 10/04/2018    PSA 3.69 06/11/2013    HGBA1C 5.0 05/09/2023      Lab reviewed by me: Particular labs of significance that I will monitor, workup, or treat to improve are mentioned below in diagnostic impression remarks.    Imaging/EKG: I have reviewed the pertinent results and my findings are noted in remarks.  274}    CC:   Chief Complaint   Patient presents with    Annual Exam    Insomnia    Hypertension    Hyperlipidemia        274}    Assessment/Plan  Tha Guzman is a 83 y.o. male who presents to clinic with:  1. Healthcare maintenance    2. Primary hypertension    3. Mixed hyperlipidemia    4. Insomnia, unspecified type    5. Primary osteoarthritis of both knees       274}  Diagnostic Impression Remarks + HPI     Documentation entered by me for this encounter may have been done in part using speech-recognition technology. Although I have made an effort to ensure accuracy, "sound like" errors may exist and should be interpreted in context.     Hypertension stable continue current meds monitor no headache or chest pain f/u blood work   HLD stable continue current meds monitor blood work rec mediterranean diet   Insomnia will increase dose  OA -needs improvement avoiding nsaids due to the side effects monitor takes norco prn   Hm will get flu shot   This is the extent of this pleasant patient's concerns at this present time. He did not feel chest pain upon exertion, dyspnea, nausea, vomiting, diaphoresis, or syncope. No pleuritic chest pain, unilateral leg swelling, calf tenderness, or calf pain. Negative for unintentional weight loss night sweats, hematuria, and fevers. Tha will return to clinic in a few months for further workup and reassessment or sooner as " needed. He was instructed to call the clinic or go to the emergency department or urgent care immediately if his symptoms do not improve, worsens, or if any new symptoms develop. As we discussed that symptoms could worsen over the next 24 hours he was advised that if any increased swelling, pain, or numbness arise to go immediately to the ED. Patient knows to call any time if an emergency arises. Shared decision making occurred and he verbalized understanding in agreement with this plan. I discussed imaging findings, diagnosis, possibilities, treatment options, medications, risks, and benefits. He had many questions regarding the options and long-term effects. All questions were answered. He expressed understanding after counseling regarding the diagnosis and recommendations. He was capable and demonstrated competence with understanding of these options. Shared decision making was performed resulting in him choosing the current treatment plan. Patient handout was given with instructions and recommendations. Advised the patient that if they become pregnant to alert us immediately to assess for medication changes. Having a healthy weight can decrease the risk of 13 cancers and is an important goal. I also discussed the importance of close follow up to discuss labs, change or modify his medications if needed, monitor side effects, and further evaluation of medical problems.     Additional workup planned: see labs ordered below.    See below for labs and meds ordered with associated diagnosis      1. Healthcare maintenance    2. Primary hypertension    3. Mixed hyperlipidemia    4. Insomnia, unspecified type  - traZODone (DESYREL) 100 MG tablet; Take 1 tablet (100 mg total) by mouth nightly as needed for Insomnia.  Dispense: 90 tablet; Refill: 3    5. Primary osteoarthritis of both knees  - HYDROcodone-acetaminophen (NORCO) 7.5-325 mg per tablet; 1 tablet PO TID prn severe pain  Dispense: 90 tablet; Refill:  0      Bart Burrows MD   274}    If you are due for any health screening(s) below please notify me so we can arrange them to be ordered and scheduled. Most healthy patients at your age complete them, but you are free to accept or refuse.     If you can't do it, I'll definitely understand. If you can, I'd certainly appreciate it!   All of your core healthy metrics are met.

## 2023-12-07 NOTE — PATIENT INSTRUCTIONS
Jeremi Almazan, Please read below to learn more on healthy habits.  Most of my patients read it.     If you are due for any health screening(s) below please notify me so we can arrange them to be ordered and scheduled. Most healthy patients at your age complete them, but you are free to accept or refuse.     If you can't do it, I'll definitely understand. If you can, I'd certainly appreciate it!     All of your core healthy metrics are met.            Table of Contents:     Cancer prevention  Explanation on the different components of your blood work and interpretation  Frequently asked questions   Blood pressure log   E-Visits  E-Consults     Cancer Prevention    Why should you choose to get screened for cancer? One simple reason is because you are important. You matter and deserve to have the best health so you can fulfill your great potential.     Colon Cancer screening - Most colon cancers can be prevented by screening. In most cases a polyp in the colon can grow and is not cancerous at first, but it can become cancerous years later. A polyp is like a seed that can grow into a weed if it is left in the soil. If the seed is detected and removed, no weed sprouts. A FIT test/Cologuard test and colonoscopy can detect precancerous polyps and lead to the prevention of cancer. A polyp is just like a seed that can be removed before the roots take hold. A colonoscopy can remove these polyps and eliminate the chance that these polyps turn into cancer.     Cervical Cancer screening- A PAP smear can detect precancerous cells that can become cervical cancer and can lead to procedures to remove them. It is very important to get a PAP smear as investing these few minutes can help prevent a lot of trouble down the road. If you want to do the most you can do to spend more time with your family and friends', cancer screenings can help with that goal.     Breast Cancer screening- Mammograms can detect breast cancer before it has spread  "and early detection allows the ability to remove the lesion prior to any spread. It is similar to a small rotten spot on fruit. If the spoiled part is removed quickly it can preserve the rest of the fruit, but if it is left alone it will corrupt the whole peach.     Smoking Cessation- "Smoking is like a chimney. The tar builds up and causes a back draft which leads to a cough. Smoking is like sitting in a car with a blocked exhaust system." Smoking can increase your risk for lung, mouth, throat, nose, esophagus, bladder, kidney, ureter, pancreas, stomach, liver, cervix, ovary, bowel, and leukemia [2]. If you have smoked in the past, you might meet the criteria for a CT lung cancer screening.     Lung Cancer Screening - "The U.S. Preventive Services Task Force (USPSTF) recommendsexternal icon yearly lung cancer screening with LDCT for people who--have a 20 pack-year or more smoking history, and smoke now or have quit within the past 15 years, and are between 50 and 80 years old. A pack-year is smoking an average of one pack of cigarettes per day for one year. For example, a person could have a 20 pack-year history by smoking one pack a day for 20 years or two packs a day for 10 years." [3]    Hypertension - Common high blood pressure meds may lower colorectal cancer risk-JENNY, July 6, 2020 - Hypertension Journal Report Medications commonly prescribed to treat high blood pressure may also reduce patients' colorectal cancer risk, according to new research published today in Hypertension, an American Heart Association journal." [4].     Low HbA1c - "Higher HbA1c levels (within both the non?diabetic and diabetic ranges) were associated with the risk of colorectal cancer (Model 2; P linear?=?0.009), especially for colon cancer." [5].    A healthy diet, exercise, limitation of alcohol use, certain infections, avoidance of radiation/environmental toxins, and staying lean lowers your cancer risk [6].     I want to empower " you to make informed choices for your health. I have a listed below the most common blood components that we check for when you get blood work. I discuss what each component measures along with common reasons for why they can be abnormal. If you are interested in understanding your blood work better, please read the lab explanation attached below.     Explanation of Lab Results    Please note: This information is included as a reference to help you better understand your lab results and is not be used for diagnosis.     Lipid Panel:  Cholesterol is a measure of cardiac risk and stroke risk. A lipid panel measures total cholesterol and provides readings which are broken down into 3 subsections: Triglycerides, HDL and LDL.    Total Cholesterol: Your total blood cholesterol is a measure of LDL cholesterol, HDL cholesterol, and other lipid components.  If your individual lipid level components are in the normal range and you have an elevated total cholesterol level we are generally not to concerned since we primarily look at the LDL cholesterol which is considered the bad cholesterol which can lead to heart attacks and strokes.  Your calculated cardiac risk is the most important factor in deciding if you would benefit from a cholesterol-lowering medication that the total cholesterol level.    Triglycerides are most diet and weight sensitive. They are affected easily by lifestyle changes. Ideally, this reading should be less than 150, although if the remainder of the cholesterol panel is within normal limits, we will tolerate upwards of 250-300. For the most part, if triglycerides are the only part of your cholesterol panel reading as 'abnormal', it is best to lose weight and modify your diet, including less saturated fat and less simple sugars. We only start medication to lower this is the level is greater than 500 since this can increase ones risk for pancreatitis. This is not the cholesterol type that leads to heart  attacks.     HDL is your 'good cholesterol.' Ideally, the reading should be over 40 and is particularly helpful when it is greater than 60. Research indicates that high HDL is extremely protective; and if your HDL level is greater than 60, we tolerate far worse LDL or triglyceride levels. For the most part, HDL is responsive to aerobic activity and ideal weight. We do not have medicines that increase the HDL reading very easily.    LDL is your 'bad cholesterol.' Our goals depend on how many cardiac risk factors you have.     High LDL: If you are diabetic or have had a heart attack, we like the LDL level to be less than 100. If you have 2 cardiac risk factors (such as diabetes, hypertension, family history, a low HDL and smoking), we like your LDL to be less than 130. If you have 0-2 cardiac risk factors, we like your LDL level to be less than 160, but we may not necessarily initiate medications to 190 unless you cannot lower it. The latest guidelines recommend to consider taking a statin only if your ASCVD cardiac risk score is at least greater than 7.5% and a strong recommendation if your risk score is greater than 10%.     Low LDL: Normal or low LDL is considered good and having an LDL below the normal range is not of a concern.     Complete Blood Count (CBC):    White blood cells: These are infection fighting cells. Mild fluctuations may represent minor illness at the time of blood draw. Marked fluctuations can represent immune diseases. This can also be elevated from smoking.     High WBC: Elevation in wbc can commonly be caused by an infection, steroid use, or any cause of inflammation such as many rheumatologic diseases, surgery, or trauma.      Low WBC: Many different things can cause this such as infections, medications, rheumatologic disorders, malignancies, nutritional deficiencies, and a normal variant in some individuals. If this occurs it is common practice to rule out a few viruses such as HIV, Hep C,  B12, folate along with a a peripheral blood smear to look for abnormalities. If you are otherwise healthy with no concerning symptoms and the workup is negative we usually monitor it with yearly blood work.  If there are other abnormal cell line abnormalities sometimes we refer to hematology to further evaluate.    Hemoglobin: A key indicator for anemia. Ranges depend on age. If you are significantly out of this range, we may need to talk with you.    High Hemoglobin: This can be elevated in some blood disorders, sleep apnea, chronic lung disease, kidney disease, testosterone use, dehydration, smoking, along with some other rare causes.     Low Hemoglobin: Many things can lead to this but the most common cause is an iron deficiency in females who lose blood during their periods, decreased absorption due to antacids, poor diet, sickle cell, anemia of chronic disease, malignancy, bleeding from the gut, along with some other less common causes. If you are a young female who has periods it is usually assumed that this is from that blood loss unless other symptoms or abnormal blood work is present. If you are above 45 and have an iron deficiency it is always recommended to get a colonoscopy to ensure that there is no lesion causing blood loss and to exclude malignancy.     Hematocrit: Another way of looking at anemia, much like your hemoglobin. If you are significantly out of this range, we may need to talk with you.    MCV: This looks at the size of your red blood cells.     High MCV:  A large number may indicate a B-12 deficiency, folate deficiency, alcohol use, liver disease, medication-induced phenomenon, or a need for further workup.    Low MCV: A small number may imply iron anemia or genetic disorder such as alpha-thalassemia. We may check iron studies called to see if you are low.    MCH: Much like MCV above.    Platelets: These are clotting factors. We tolerate a broad range in this. If your numbers are less than  100, we may need to work it up.     High Platelet Count: If your numbers are elevated, this could represent ongoing inflammation within the body which can be caused by any infection, illness, iron deficiency, or other malignancy. We usually recheck it to monitor for improvement and if it does not resolve will work it up with more blood work.     Low Platelet Count: Many things can cause this such as infections, alcohol use, medications, liver disease, vitamin deficiencies, aspleenia, and some other rare blood disorders. If it persists many times we refer to hematology if a cause is not identified.     Iron:  This is not a reliable blood marker since this fluctuates throughout the day and if you are fasting many times your iron level in your blood is low but this does not necessarily mean you have a deficiency.  There are more reliable ways to measure your iron stores and these are discussed below.    Transferrin:  Many things can cause an abnormal result and this is not as important as some other labs mentioned below.    TIBC:  This is the total iron-binding capacity and this measures the total amount of iron that can be bound by proteins in the blood.  If you have an elevated TIBC this is a good marker that you could be low on iron and this is useful blood marker.  A simple way to think of this is seats in a car. The TIBC is the number of open seats that iron can sit in. If you have a high TIBC (number of open seats) that means you have a low iron level.     Ferritin:  A low ferritin is almost always caused from an iron deficiency.  A normal ferritin level does not need necessarily exclude an iron deficiency since many inflammatory conditions such as kidney disease, diabetes, arthritis, and obesity can raise this level hiding an iron deficiency.  If you are healthy with no inflammatory conditions this is a very reliable test for detecting an iron deficiency since nothing else lowers your ferritin level except a low  iron level. Keep in mind that you can have an iron deficiency if your ferritin level is normal but your TIBC is elevated.  If you have a low iron level the next step is always to identify why you have a low iron level.    CMP (Comprehensive Metabolic Panel):  Broadly, this is a test of organ function including the kidneys, liver, and electrolyte levels.    Glucose: This is primarily looking for diabetes. We like your blood glucose level to be less than 100 when fasting. Readings of 100-125 indicate what we call 'pre-diabetes' or 'glucose intolerance.' This does not necessarily indicate diabetes, but we may check another test called a hemoglobin A1C for confirmation. This level puts you at great risk for becoming a true diabetic and we would encourage the reduction of simple sugars and processed white flour as well as appropriate weight loss. If this number is greater than 125, it is likely you are diabetic; we will get an additional hemoglobin A1C test and will likely schedule you for an appointment. If you notice that your blood glucose is above 125 and we have not scheduled a follow-up appointment, please call us. Patients who are known diabetics can have readings greater than 125.    Urea Nitrogen: This is a kidney function and maybe elevated because of mild dehydration or because of excessive muscle breakdown from aggressive exercise habits.    Creatinine: This also is a kidney function test. It may be mildly elevated if you have particularly large muscle bulk or taking a supplement like creatine. It is related to the GFR. It is a muscle product that we track to look at your kidney function. If this is elevated and new, we may need to talk with you. If you have had this mildly elevated in the past, it is likely that we will just track it to ensure that it does not worsen quickly. Some medications may gently worsen this, namely blood pressure pills called ace inhibitors. To some degree, we will permit levels of  up to 1.6.    Sodium: This is essentially the concentration of salt within the body. This may be mildly low because of dehydration, overhydration, diuretics, .    Potassium: This is an electrolyte that can cause muscular cramping or cardiac difficulties. It is sometimes lowered by diuretic medications.    Chloride: For the most part, this is only relevant if the other electrolytes are abnormal.    CO2: This is a function of acid balance within the body. For the most part, mild abnormalities are not important and may represent a starvation or dehydration state when blood was drawn. Many medications can change this level as well such as diuretics, acetazolamide, calcium carbonate, laxatives, aspirin, and many others.    High CO2: Many things can cause this which includes dehydration, sleep apnea, and COPD.     Low CO2: Many things can lead to a low level and if you are generally healthy we are usually  not concerned. Diarrhea, renal disease, diabetes, and many medications can cause this.     Anion Gap: Only relevant if your CO2 is abnormal.    Calcium: This is not related to dietary intake of calcium. It may fluctuate gently based on the amount of protein within your body. If it is above 10.9, we may need to do additional testing. Many times if low the albumin level is low and once the corrected calcium level is calculated the calcium is in a normal range.     Total protein: This looks at protein within the body. Markedly elevated levels can represent an immune response and may require further workup.    Albumin: This may be elevated because of particularly high level of fitness. If it is markedly suppressed, it may represent organ dysfunction, particularly in the liver or kidneys.    AST and ALT: These are enzymes in the liver and if elevated can indicate liver damage.     Elevated AST/ALT: Many things can caused elevated liver enzymes and the most common reason is viral infections, alcohol use, medications,  supplements, autoimmune, along with some other rare causes. One of the most common reasons for a mild elevation in liver enzymes (less than 3 times the upper limit) is fatty liver disease. Many individuals who have extra fat in their diet store this in the liver and this can build up and cause a mild elevation. This is usually diagnosed with a liver ultrasound and exclusion of other causes. The only treatment for this is diet and exercise along with avoidance of liver toxic medications and alcohol. It is standard of care to rule our viral hepatitis and get imaging of the liver if elevated. This is monitored and if I feel concerned will refer to hepatology.     Alk Phos: Part of liver function or bones    High Alk Phos: elevated levels may indicate a liver injury or obstruction of bile flow. Elevated levels can also be seen in vitamin D deficiency, drug induced, or bone disorders.     Low Alk Phos: In most cases having a low Alkaline Phosphatase enzyme activity is not due to any disease and is simply a normal variant.  Having an elevated Alk Phos is more concerning and associated with many diseases and thus I would not be overly concerned with a low level which is seen in many health individuals. The reasons for a low serum alkaline phosphatase activity were reviewed in a 1-yr retrospective study and in this study it was found that no cause was found in most cases.  Low activity values were recorded in several individuals in the absence of any obvious cause. This would suggest that the definition of the lower limit of the reference range for alkaline phosphatase is arbitrary, thus limiting the use of low serum activity as a marker of disease.  In some cases micronutrients like Zinc (Zn) and Magnesium (Mg) are causes of low ALP activity and you can take zinc or magnesium supplements. Unfortunately, the blood work to measure zinc and magnesium levels are unreliable and not very accurate since it does not test for the  intracellular zinc or magnesium levels.     Yusra BACON, Feng BARNETT, Susanna DOMINGUEZ. Clinical significance of a low serum alkaline phosphatase. Net J Med. 1992 Feb;40(1-2):9-14. PMID: [id number].  Volodymyr COUGHLIN, Santhosh B, Maribeth I, Behera S, Volodymyr S. Low Alkaline Phosphatase (ALP) In Adult Population an Indicator of Zinc (Zn) and Magnesium (Mg) Deficiency. Curr Res Nutr Food Sci 2017;5(3). doi : http://dx.[email or web address]    Total Bilirubin: This is also part of liver function.    High T Bili: If you have right upper quadrant pain an elevation in total bilirubin can be caused by a gallbladder stone that is blocking the biliary tract that leads to your gastrointestinal tract. If you have fever and right upper quadrant pain this can sometimes be elevated when your gallbladder is infected and most individuals have nausea with vomiting associated with it. If you have no symptoms and are otherwise healthy this can be caused by Gilbert syndrome which is a benign normal variant. There are other causes such as some anemias but there would be abnormal blood counts.     Low T Bili: Nothing to be concerned about.     Thyroid Stimulating Hormone (TSH): This reading is an indicator of your thyroid function. The thyroid regulates energy levels throughout the entire body, affecting almost every organ system. This is an inverse relationship so a high number actually presents a low thyroid. If this is abnormal, we will often check an additional lab called a Free T4 to evaluate this more carefully. Borderline elevations, those of 5-10, can be watched or worked up further. Please do not take the supplement Biotin for at least a week prior to getting your TSH checked since this can lead to false measurement levels.     Prostate Specific Antigen (PSA): (Men only.) This is a prostate cancer screening test, and is no longer a routine screening test. Levels are truly a function of age. Being less than 4 is typical for someone more in  "their 60s. If you are young, it should probably be less than 3. A higher PSA result does not necessarily mean that you have cancer, but may indicate a need for a discussion with your provider. Options include observation to look at rate of rise or prostate biopsy. Not only is the absolute value important, but how much it has changed from previous years. Please ensure that there is not a dramatic rise from previous years.    Please Note: This information is included as a reference to help you better understand your lab results and is not be used for diagnosis.    Frequently asked questions    When should I take my blood pressure medication?    The latest studies show that taking your blood pressure medication at night is the best time. A recent study showed that this prevents more heart attacks and strokes. See the answer below from [place].     "Q. I've taken blood pressure medicines every morning for many years, and they keep my pressure under control. Recently, my doctor recommended taking them at bedtime, instead. Does that make sense?    A. It actually does make sense -- based on recent research. For many years, there have been at least three theoretical reasons for taking blood pressure medicines before bedtime. First, a body system that strongly affects blood pressure, called the renin-angiotensin system, has its peak activity during sleep. Second, circadian rhythms cause differences in the body chemistry at night compared with daytime. Third, most heart attacks occur in the morning, before medicines taken in the morning have a chance to "kick in."" [6].     When should I take my cholesterol medication?    It used to be recommended to take your cholesterol medication at night since the original statins that lowered cholesterol did not last 24 hours and most cholesterol synthesis is done at night. The long acting statins such as atorvastatin and rosuvastatin last 24 hours so they can be taken any time during " "the day. Simvastatin, pravastatin, fluvastatin, and lovastatin are shorter acting and should be taken at bed time.     Can supplements affect my blood work?    Yes they can. A very important supplement to not take for at least a week prior to your blood work is biotin. "Biotin supplement use is common and can lead to the false measurement of thyroid hormone in commonly used assays." [8.]    What are conditions that should not be addressed during a virtual visit?    There are some conditions that should not be evaluated via a virtual visit since optimal care is impossible. Chest pain, shortness of breath, lung conditions, abdominal pain, and any neurological complaint such as headache, dizziness, numbness ect.         When will I get commentary of my blood work?    I review all blood work that you get and I will send out commentary on this via [employer] within 72 hours. In most cases you will get a message from me sooner, but many times not all of the blood work is completed thus I usually wait until all results have returned. If there is a critical abnormality you should be contacted the same day you got blood work.     How frequently do I need to have visits to get controlled substances?    It is standard protocol to have a visit every 3 months if you are taking scheduled substances such as ADHD medications, psychiatric medications, and pain medications. This is to ensure your safety and monitor for any side effects.     When should I bring prior to a visit if I want to lose weight?    I recommend that you make a food diary for a week and fill out what you ate each day. You can bring this form in to your visit and I can look over it to suggest changes that you can make.     Which over the counter medications should I avoid if I have decreased kidney function?    NSAIDs which includes ibuprofen (Advil, Motrin, Nuprin), naproxen (Aleve), meloxicam (Mobic) and diclofenac(Zorvolex, Voltaren) and ketorolac (Toradol) can " damage your kidneys if you take this long term.Tylenol does not affect your kidney and thus is safe as long as you don't have liver disease.     Is there an [facility]?     Yes! The [facility] is located on the first floor of the [facility]. The address is listed below. You can get curbside pickup if you call their number at [phone]. One of the many benefits of using the [facility] pharmacy is that the pharmacists can contact me directly if a cheaper alternative is available to save you money. They also see your note to know more about what the medication was prescribed for. I recommend this pharmacy since communication with me is quick in case any confusion arises on your medications.     [address] [facility].  [address]  [place] [address]  Phone: [phone]    Hours:  Monday - Friday  8:00 a.m. - 5:30 a.m.    Why is it not in my best interest to call in order to get an antibiotic?    Medicine is a complex field and many times the correct diagnosis is critical in order to provide the correct care. One of the most important goals of a healthcare provider is to ensure that no dangerous condition is masquerading as a mild illness. Specific questions are very important to obtain during an examination that provide a wealth of information to understand your illness. Health care providers are trained to investigate for signs that can be dangerous to your health. Messaging or calling the office in order to get an antibiotic can be very dangerous.     For example, many urinary tract infections can lead to an infection in the kidney that can result in a serious blood stream infection that can lead to hospitalization if not recognized. A cough can be caused by many different things and not necessarily an infection. It is not uncommon that one assumes a cough is from an infection when it is actually caused by a blood clot in the lungs. This can lead to death. Determining your risk can only be performed after a  thorough history and examination. A few sentences through e-mail is not enough.     What are some common symptoms that should be evaluated by the emergency department and not by phone or e-mail?    This does not include every symptom, but common examples of symptoms that should prompt one to go to the emergency department are chest pain, chest pressure, shortness of breath, difficulty breathing, abdominal pain, weakness or numbness or an extremity, sudden weakness or drooping on one side of the body, speaking difficulty, unusual or bad headache (particularly if it started suddenly), head injury, confusion, seizure, passing out, lightheaded, pain in arm or jaw, suddenly not able to speak, see, walk, or move, dizziness, neck stiffness, suicidal thoughts, testicular pain, cuts and wounds, severe pain, along with many others. This is not an inclusive list.     Outside Records    It is common to have an colonoscopy, mammogram, PAP smear, or eye exam done outside the [facility] system. Many times we do not get the records automatically sent to us. Please call your provider's office to notify them to fax us your records so that we can have the most up to date information. Your provider will review your outside results in order to provide you with the complete care that you deserve. We appreciate that you decided to choose us to be serving on your healthcare team and the more information we have about your health is essential.     If I have a psychiatric crisis what should I do?    If you ever feel that there is a risk of a harm to yourself we recommend to go to the emergency room. There is a National Suicide Prevention lifeline number 8-151-222-TALK which route you to the nearest crisis center. There is also a suicide hotline 6-669-CFAGGQX (1-766.322.1044).    988 has been designated as the new three-digit dialing code that will route callers to the National Suicide Prevention Lifeline (now known as the [facility] [employer]), and is now active across the [place].    When people call, text, or chat 988, they will be connected to trained counselors that are part of the existing Lifeline network. These trained counselors will listen, understand how their problems are affecting them, provide support, and connect them to resources if necessary.    The previous Lifeline phone number (1-668.438.2038) will always remain available to people in emotional distress or suicidal crisis.    The [place] network of over 200 crisis centers has been in operation since 2005, and has been proven to be effective. Its the counselors at these local crisis centers who answer the contacts the Lifeline receives every day. Numerous studies have shown that callers feel less suicidal, less depressed, less overwhelmed and more hopeful after speaking with a Lifeline counselor.     E-Visits    E-Visits are currently available for three conditions: Urinary tract infections, Sinus symptoms, and rashes.     If you have one of these infections or rash you can fill out a questionnaire that will be sent to your provider who can review and send an appropriate medication. No visit is needed.      What is an E-Visit?    An E-Visit is a way to get care for certain conditions without needing to schedule a virtual visit or to come into the clinic. We'll ask you some clinically based questions about yourself and your symptoms and your provider will evaluate, make a diagnosis and respond with a care plan with recommendations including testing and medications as indicated, just as they would in an in-person setting.  If it becomes clear that you need to be seen virtually or in-person after the E-Visit, we can arrange that.         Should I use an E-Visit?    E-Visits should be used only for non-urgent medical conditions. If you need urgent medical care, please contact your clinic by phone, schedule a same-day appointment online or find a nearby [facility]. For serious medical emergencies, please call 911 immediately.         What to expect during an E-Visit   Once you have agreed to an E-Visit, you will be prompted to complete the E-Visit clinical questionnaire in [employer], which can take 10-20 minutes to complete. You may also be asked to confirm your medications.     Since this is a medical evaluation, it is billable to your insurance. Because this is a billable visit, you may also be asked for your insurance details and to enter your credit card information, just as you would for any other visit or co-pay. Much of this is stored in your account, so it should be easy to access or update if needed. You may receive a bill for this service, including any applicable copay amount, after the service is rendered.     Please allow up to 24 business hours for a reply. At any point in the E-Visit process, if you have not received a response within 72 hours, please call your clinic.        To initiate the E-Visit process in [facility], click here.       E-Consults    E-Consults are available when you need to have a specialists opinion on one thing and your PCP agrees to send an E-Consult to answer your question within 48 hours. This not only saves you time but money as well since a visit is not always needed.          Patient Education       Checking Your Blood Pressure at Home   The Basics   Written by the doctors and editors at [facility]   How is blood pressure measured? -- Blood pressure is usually measured with a device that goes around your upper arm. This is often done in a doctor's office. But some people also check their blood pressure themselves, at home or at work.  Blood pressure is explained with 2 numbers. For instance, your blood pressure might be "140 over 90." The first (top) number is the pressure inside your arteries when your heart is oswaldo. The second (bottom) number is the pressure inside your arteries when your heart is relaxed. The table shows " "how doctors and nurses define high and normal blood pressure (table 1).  If your blood pressure gets too high, it puts you at risk for heart attack, stroke, and kidney disease. High blood pressure does not usually cause symptoms. But it can be serious.  What is a home blood pressure meter? -- A home blood pressure meter (or "monitor") is a device you can use to check your blood pressure yourself. It has a cuff that goes around your upper arm (figure 1). Some devices have a cuff that goes around your wrist instead. But doctors aren't sure if these work as well. The meter also has a small screen, or dial, that shows your blood pressure numbers.  There are also special meters you can wear for a day or 2. These are different because they automatically check your blood pressure throughout the day and night, even while you are sleeping. If your doctor thinks you should use one of these devices, they will talk to you about how to wear it.  Why do I need to check my blood pressure at home? -- If your doctor knows or suspects that you have high blood pressure, they might want you to check it at home. There are a few reasons for this. Your doctor might want to look at:  Whether your blood pressure measures the same at home as it did in the doctor's office  How well your blood pressure medicines are working  Changes in your blood pressure, for example, if it goes up and down  People who check their own blood pressure at home usually do better at keeping it low.  How do I choose a home blood pressure meter? -- When choosing a home blood pressure meter, you will probably want to think about:  Cost - Some devices cost more than others. You should also check to see if your insurance will help pay for your device.  Size - It's important to make sure the cuff fits your arm comfortably. Your doctor or nurse can help you with this.  How easy it is to use - You should make sure you understand how to use the device. You also need to be " able to read the numbers on the screen.  You do not need a prescription to buy a home blood pressure meter. You can buy them at most pharmacies or over the internet. Your doctor or nurse can help you choose the right device for you.  How do I check my blood pressure at home? -- Once you have a home blood pressure meter, your doctor or nurse should check it to make sure it fits you and works correctly.  When it's time to check your blood pressure:  Go to the bathroom and empty your bladder first. Having a full bladder can temporarily increase your blood pressure, making the results inaccurate.  Sit in a chair with your feet flat on the ground.  Try to breathe normally and stay calm.  Attach the cuff to your arm. Place the cuff directly on your skin, not over your clothing. The cuff should be tight enough to not slip down, but not uncomfortably tight.  Sit and relax for about 3 to 5 minutes with the cuff on.  Follow the directions that came with your device to start measuring your blood pressure. This might involve squeezing the bulb at the end of the tube to inflate the cuff (fill it with air). With some monitors, you just need to press a button to inflate the cuff. When the cuff fills with air, it feels like someone is squeezing your arm, but it should not hurt. Then you will slowly deflate the cuff (let the air out of it), or it will deflate by itself. The screen or dial will show your blood pressure numbers.  Stay seated and relax for 1 minute, then measure your blood pressure again.  How often should I check my blood pressure? -- It depends. Different people need to follow different schedules. Your doctor or nurse will tell you how often to check your blood pressure, and when. Some people need to check their blood pressure twice a day, in the morning and evening.  Your doctor or nurse will probably tell you to keep track of your blood pressure for at least a few days (table 2). Then they will look at the numbers.  "The reason for this is that it's normal for your blood pressure to change a bit from day to day. For example, the numbers might change depending on whether you recently had caffeine, just exercised, or feel stressed. Checking your blood pressure over several days - or longer - will give your doctor or nurse a better idea of what is average for you.  How should I keep track of my blood pressure? -- Some blood pressure meters will record your numbers for you, or send them to your computer or smartphone. If yours does not do this, you will need to write them down. Your doctor or nurse can help you figure out the best way to keep track of the numbers.  What if my blood pressure is high? -- Your doctor or nurse will tell you what to do if your blood pressure is high when you check it at home. If you get a number that is higher than normal, measure it again to see if it is still high. If it is very high (above a certain number, which your doctor or nurse will tell you to watch out for), you should call your doctor right away.  If your blood pressure is only a little high, your doctor or nurse might tell you to keep checking it for a few more days or weeks, and then call if it does not go back down. Then they can help you decide what to do next.  All topics are updated as new evidence becomes available and our peer review process is complete.  This topic retrieved from [employer] on: Sep 21, 2021.  Topic 209164 Version 4.0  Release: 29.4.2 - C29.263  © 2021 UpToDate, Inc. and/or its affiliates. All rights reserved.  table 1: Definition of normal and high blood pressure  Level  Top number  Bottom number    High 130 or above 80 or above   Elevated 120 to 129 79 or below   Normal 119 or below 79 or below   These definitions are from the American College of Cardiology/American Heart Association. Other expert groups might use slightly different definitions.  "Elevated blood pressure" is a term doctor or nurses use as a warning. " It means you do not yet have high blood pressure, but your blood pressure is not as low as it should be for good health.  Graphic 37068 Version 6.0  figure 1: Using a home blood pressure meter     This is an example of a person using a home blood pressure meter.  Graphic 372144 Version 1.0    Consumer Information Use and Disclaimer   This information is not specific medical advice and does not replace information you receive from your health care provider. This is only a brief summary of general information. It does NOT include all information about conditions, illnesses, injuries, tests, procedures, treatments, therapies, discharge instructions or life-style choices that may apply to you. You must talk with your health care provider for complete information about your health and treatment options. This information should not be used to decide whether or not to accept your health care provider's advice, instructions or recommendations. Only your health care provider has the knowledge and training to provide advice that is right for you. The use of this information is governed by the [employer] End User License Agreement, available at https://www.[employer]/en/solutions/[employer]/about/toyin.The use of [employer] content is governed by the [employer] Terms of Use. ©2021 UpToDate, Inc. All rights reserved.  Copyright   © 2021 UpToDate, Inc. and/or its affiliates. All rights reserved.      Daily Blood Pressure Log    Please print this form to assist you in keeping track of your blood pressure at home.      Name:  Date of Birth:       Average Blood Pressure:           Date: Time  (a.m.) Blood  Pressure: Pulse  Rate: Time  (p.m.) Blood  Pressure : Pulse  Rate: Comments:   Sample 8:37 127/83 84    Stressful morning                                                                                                                                                                                                     Wishing you  good health,     Bart Burrows MD     References:  1-https://www.[employer]/speakers/william_vertes  2-https://www.[employer].com.au/news/sepww-sbt-57-cancers-that-can-bk-ctnyga-hy-smoking/  3-https://www.cdc.gov/cancer/lung/basic_info/screening.htm  4-https://newsroom.heart.org/news/common-hypertension-medications-may-reduce-colorectal-cancer-risk  5-Melissao A, Qasim M, Sawada N, et al. High hemoglobin A1c levels within the non-diabetic range are associated with the risk of all cancers. Int J Cancer. 2016;138(7):9981-3799. doi:10.1002/ijc.16166  6-https://www.health.[place].edu/newsletter_article/the-10-commandments-of-cancer-prevention  7-https://www.health.[place].edu/diseases-and-conditions/should-i-take-blood-pressure-medications-at-night  8-Maru JOHNSON et al 2018 Prevalence of biotin supplement usage in outpatients and plasma biotin concentrations in patients presenting to the emergency department. Clin Biochem. Epub 2018 Jul 20. PMID: [id number].

## 2024-01-04 DIAGNOSIS — G47.00 INSOMNIA, UNSPECIFIED TYPE: ICD-10-CM

## 2024-01-04 RX ORDER — TRAZODONE HYDROCHLORIDE 50 MG/1
TABLET ORAL
Qty: 90 TABLET | Refills: 2 | OUTPATIENT
Start: 2024-01-04

## 2024-01-04 NOTE — TELEPHONE ENCOUNTER
Refill Decision Note   Tha Guzman  is requesting a refill authorization.  Brief Assessment and Rationale for Refill:  Quick Discontinue     Medication Therapy Plan:  The original prescription was discontinued on 12/7/2023 by Bart Burrows MD.      Comments:     Note composed:10:30 AM 01/04/2024             Appointments     Last Visit   12/7/2023 Bart Burrows MD   Next Visit   6/19/2024 Bart Burrows MD           Appointments     Last Visit   12/7/2023 Bart Burrows MD   Next Visit   6/19/2024 Bart Burrows MD

## 2024-01-04 NOTE — TELEPHONE ENCOUNTER
No care due was identified.  [facility] Embedded Care Due Messages. Reference number: [id number].   1/04/2024 12:12:25 AM CST

## 2024-05-13 ENCOUNTER — PATIENT MESSAGE (OUTPATIENT)
Dept: FAMILY MEDICINE | Facility: CLINIC | Age: 84
End: 2024-05-13
Payer: MEDICARE

## 2024-05-28 ENCOUNTER — PATIENT MESSAGE (OUTPATIENT)
Dept: UROLOGY | Facility: CLINIC | Age: 84
End: 2024-05-28
Payer: MEDICARE

## 2024-06-10 ENCOUNTER — OFFICE VISIT (OUTPATIENT)
Dept: UROLOGY | Facility: CLINIC | Age: 84
End: 2024-06-10
Payer: MEDICARE

## 2024-06-10 VITALS — WEIGHT: 160.94 LBS | HEIGHT: 63 IN | BODY MASS INDEX: 28.52 KG/M2

## 2024-06-10 DIAGNOSIS — N13.8 BPH WITH OBSTRUCTION/LOWER URINARY TRACT SYMPTOMS: Primary | ICD-10-CM

## 2024-06-10 DIAGNOSIS — N40.1 BPH WITH OBSTRUCTION/LOWER URINARY TRACT SYMPTOMS: Primary | ICD-10-CM

## 2024-06-10 DIAGNOSIS — N32.81 OAB (OVERACTIVE BLADDER): ICD-10-CM

## 2024-06-10 LAB
BILIRUBIN, UA POC OHS: NEGATIVE
BLOOD, UA POC OHS: NEGATIVE
CLARITY, UA POC OHS: CLEAR
COLOR, UA POC OHS: YELLOW
GLUCOSE, UA POC OHS: NEGATIVE
KETONES, UA POC OHS: NEGATIVE
LEUKOCYTES, UA POC OHS: NEGATIVE
NITRITE, UA POC OHS: NEGATIVE
PH, UA POC OHS: 7
POC RESIDUAL URINE VOLUME: 68 ML (ref 0–100)
PROTEIN, UA POC OHS: NEGATIVE
SPECIFIC GRAVITY, UA POC OHS: 1.02
UROBILINOGEN, UA POC OHS: 0.2

## 2024-06-10 PROCEDURE — 1160F RVW MEDS BY RX/DR IN RCRD: CPT | Mod: CPTII,S$GLB,, | Performed by: NURSE PRACTITIONER

## 2024-06-10 PROCEDURE — 81003 URINALYSIS AUTO W/O SCOPE: CPT | Mod: QW,S$GLB,, | Performed by: NURSE PRACTITIONER

## 2024-06-10 PROCEDURE — 99999 PR PBB SHADOW E&M-EST. PATIENT-LVL III: CPT | Mod: PBBFAC,,, | Performed by: NURSE PRACTITIONER

## 2024-06-10 PROCEDURE — 51798 US URINE CAPACITY MEASURE: CPT | Mod: S$GLB,,, | Performed by: NURSE PRACTITIONER

## 2024-06-10 PROCEDURE — 1101F PT FALLS ASSESS-DOCD LE1/YR: CPT | Mod: CPTII,S$GLB,, | Performed by: NURSE PRACTITIONER

## 2024-06-10 PROCEDURE — 1159F MED LIST DOCD IN RCRD: CPT | Mod: CPTII,S$GLB,, | Performed by: NURSE PRACTITIONER

## 2024-06-10 PROCEDURE — 3288F FALL RISK ASSESSMENT DOCD: CPT | Mod: CPTII,S$GLB,, | Performed by: NURSE PRACTITIONER

## 2024-06-10 PROCEDURE — 1126F AMNT PAIN NOTED NONE PRSNT: CPT | Mod: CPTII,S$GLB,, | Performed by: NURSE PRACTITIONER

## 2024-06-10 PROCEDURE — 99214 OFFICE O/P EST MOD 30 MIN: CPT | Mod: S$GLB,,, | Performed by: NURSE PRACTITIONER

## 2024-06-10 RX ORDER — MIRABEGRON 50 MG/1
50 TABLET, EXTENDED RELEASE ORAL DAILY
Qty: 30 TABLET | Refills: 11 | Status: SHIPPED | OUTPATIENT
Start: 2024-06-10 | End: 2024-06-19

## 2024-06-10 NOTE — PATIENT INSTRUCTIONS
Increase myrbetriq to 50 mg daily    Conservative measures to control urgency and frequency including   1. Avoiding/minimizing bladder irritants (see below), especially in afternoon and evening hours    Discussed bladder irritants include coffee (even decaf), tea, alcohol, soda, spicy foods, acidic juices (orange, tomato), vinegar, and artificial sweeteners/sugary beverages.    2. timed voiding - empty on a schedule (approx ~2-3 hours) in spite of need to urinate, to get ahead of urge    3. dont postponing voiding - dont hold it on purpose     4. bowel regimen as distended bowel has extrinsic compressive effect on bladder.   - any or all of the following in any combination, titrate to soft daily bowel movement without pushing or straining  - colace/stool softener capsule - once to twice daily  - miralax - 1 capful daily to start, can increase to 2x daily (or decrease to 1/2 cap daily)  - increase dietary fibery  - fiber supplements, such as metamucil  - prunes, prune juice    5. INCREASE water intake    6. Stop fluids 2 hours before bed, and urinate just before bed

## 2024-06-10 NOTE — PROGRESS NOTES
CHIEF COMPLAINT:    Mr. Guzman is a 84 y.o. male presenting for f/u BPH/LUTS  PRESENTING ILLNESS:    Tha Guzman is a 84 y.o. male who presents for f/u BPH/LUTS. Last clinic visit was 11/8/23.    6/10/24  Pt presents today for f/u BPH/LUTS  Taking uroxatral and myrbetriq 25 mg  Pt reports worsening urinary frequency, urgency and leakage getting to bathroom once urge occurs. He reports mainly dribbling as leakage, not large amount  He would like to increase myrbetriq to 50 mg   Moderate urinary stream, stable since last visit.  Denies dysuria, gross hematuria, flank pain, fever, chills, nausea or vomiting  5/9/23 creatinine 0.8 / GFR>60  UA negative  PVR 68 ml    11/8/23  Pt completed course of bactrim as ordered after last visit.  Pt sent portal message 11/6 that he had small spots of blood in his underwear and prostate irritation. No further episodes of blood since. No dysuria, flank pain, fever, chills, nausea or vomiting.  UA negative today    10/9/23  Pt presents to clinic for dysuria, bladder and prostate discomfort that started a few days ago. He had this in the past and took bactrim which worked well for him. Denies gross hematuria, flank pain, fever, chills, nausea or vomiting. No testicle pain or scrotal swelling. No penile discharge.   Pt last voided 45 min ago, Bladder scan 201 ml. Pt able to void prior to leaving clinic to provide specimen. UA negative    He is taking uroxatral and myrbetriq for LUTS. No change to baseline voiding.     7/31/23  Patient was started on myrbetriq 25 mg last clinic visit. He continues to have frequency but no longer having leakage trying to get to the bathroom. He is also taking uroxatral. He is satisfied with results of myrbetriq. Denies dysuria, gross hematuria, flank pain, fever, chills, nausea or vomiting  PVR 62 ml    1/30/23  No longer taking Vesicare. Saw some effect on this however could not tolerate the dry mouth.   Frequency is still bothersome.   No gross  "hematuria.   Only on Uroxatral.      PVR 57 cc     7/25/22  Previously given trial of Vesicare 10 mg.   This has helped greatly. Frequency and incontinence are much better.   Side effects are manageable.   Still on the Finasteride and Uroxatral.      For the past week he has been having some slight dysuria and suprapubic discomfort.   Having regular bowel movements.      No further hematospermia.      UA today negative for blood, leuks, nitrite   PVR 84 cc     4/18/22  Patient was previously started on Finasteride. Has not noticed a big difference on this.   Urinating every 1.5-2 hours. He does have some urgency and mild urge incontinence.   Weak stream and straining to urinating.      No hematuria or dysuria.   Twice he has taken cipro twice for 5 days due to bothersome prostatitis symptoms.      PVR: 64 cc     10/14/21  Previous patient of Dr. Hawkins, last seen Jan 2021.  Hx of BPH for which he is on Uroxatral.   Hx of microscopic hematuria s/p negative workup in 2016.     Was seen by Gabriella on 9/16 and was prescribed bactrim x 21 days for prostatitis. He takes that this was a "copper/brown discharge" in his underwear. This was not associated with urination.   At that time he was having some discomfort with sitting and bowel movements.   Has regular daily bowel movements.      No hematuria. Some discomfort with urination. No dysuria.   Has some urinary frequency q 2 hours. Stream is weak. Sits to urinate. Has some urgency and urge incontinence.      UA today: +trace blood, leuk and nitrite negative   PVR today: 74 cc     Last urine culture: no documented UTIs  PSA 1/14/21: 4.1       REVIEW OF SYSTEMS:    Review of Systems    Constitutional: Negative for fever and chills.   Gastrointestinal: Negative for nausea, vomiting.   Genitourinary:  See above  Neurological: Negative for dizziness.   Psychiatric/Behavioral: Negative for confusion.       PATIENT HISTORY:    Past Medical History:   Diagnosis Date    Arthritis "     Elevated PSA     Essential hypertension, benign     Mixed hyperlipidemia     Prostatitis, unspecified     Wears glasses        Past Surgical History:   Procedure Laterality Date    cataracts      COLONOSCOPY N/A 8/14/2019    Procedure: COLONOSCOPY;  Surgeon: Dominick Sr MD;  Location: [facility];  Service: Endoscopy;  Laterality: N/A;    COLONOSCOPY N/A 8/2/2023    Procedure: COLONOSCOPY;  Surgeon: Dominick Sr MD;  Location: [facility];  Service: Endoscopy;  Laterality: N/A;    HEMORRHOID SURGERY      KNEE ARTHROSCOPY W/ MENISCAL REPAIR Left     TONSILLECTOMY       PHYSICAL EXAMINATION:    Constitutional: He is oriented to person, place, and time. He appears well-developed and well-nourished.  He is in no apparent distress.    Abdominal:  He exhibits no distension.  There is no CVA tenderness.     Neurological: He is alert and oriented to person, place, and time.     Psych: Cooperative with normal affect.    Physical Exam      LABS:    Lab Results   Component Value Date    PSA 3.69 06/11/2013    PSA 3.75 12/07/2012    PSA 4.91 (H) 09/26/2012    PSADIAG 4.1 (H) 01/14/2021    PSADIAG 3.7 01/06/2020    PSADIAG 5.9 (H) 10/25/2019     Lab Results   Component Value Date    CREATININE 0.8 05/09/2023         IMPRESSION:    Encounter Diagnoses   Name Primary?    OAB (overactive bladder) Yes    BPH with obstruction/lower urinary tract symptoms      PLAN:  -Discussed BPH/LUTS/OAB  Will increase myrbetriq to 50 mg, refilled to pharmacy  Continue uroxatral, no refills needed    -Conservative measures to control urgency and frequency including   1. Avoiding/minimizing bladder irritants (see below), especially in afternoon and evening hours  Discussed bladder irritants include coffee (even decaf), tea, alcohol, soda, spicy foods, acidic juices (orange, tomato), vinegar, and artificial sweeteners/sugary beverages.  2. timed voiding - empty on a schedule (approx ~2-3 hours) in spite of need to urinate, to get ahead of  urge  3. dont postponing voiding - dont hold it on purpose   4. bowel regimen as distended bowel has extrinsic compressive effect on bladder.   - any or all of the following in any combination, titrate to soft daily bowel movement without pushing or straining  - colace/stool softener capsule - once to twice daily  - miralax - 1 capful daily to start, can increase to 2x daily (or decrease to 1/2 cap daily)  - increase dietary fibery  - fiber supplements, such as metamucil  - prunes, prune juice  5. INCREASE water intake  6. Stop fluids 2 hours before bed, and urinate just before bed    -Pt will send portal message in 4 weeks with update on OAB symptoms with myrbetriq 50 mg    -RTC with Dr Pompa if no improvement    I encouraged him or any of his family members to call or email me with questions and/or concerns.      30 minutes of total time spent on the encounter, which includes face to face time and non-face to face time preparing to see the patient (eg, review of tests), Obtaining and/or reviewing separately obtained history, Documenting clinical information in the electronic or other health record, Independently interpreting results (not separately reported) and communicating results to the patient/family/caregiver, or Care coordination (not separately reported).

## 2024-06-11 ENCOUNTER — PATIENT MESSAGE (OUTPATIENT)
Dept: UROLOGY | Facility: CLINIC | Age: 84
End: 2024-06-11
Payer: MEDICARE

## 2024-06-19 ENCOUNTER — LAB VISIT (OUTPATIENT)
Dept: LAB | Facility: HOSPITAL | Age: 84
End: 2024-06-19
Attending: STUDENT IN AN ORGANIZED HEALTH CARE EDUCATION/TRAINING PROGRAM
Payer: MEDICARE

## 2024-06-19 ENCOUNTER — OFFICE VISIT (OUTPATIENT)
Dept: FAMILY MEDICINE | Facility: CLINIC | Age: 84
End: 2024-06-19
Payer: MEDICARE

## 2024-06-19 VITALS
HEART RATE: 93 BPM | WEIGHT: 147.69 LBS | HEIGHT: 63 IN | RESPIRATION RATE: 17 BRPM | TEMPERATURE: 99 F | BODY MASS INDEX: 26.17 KG/M2 | SYSTOLIC BLOOD PRESSURE: 114 MMHG | OXYGEN SATURATION: 96 % | DIASTOLIC BLOOD PRESSURE: 72 MMHG

## 2024-06-19 DIAGNOSIS — Z00.00 HEALTHCARE MAINTENANCE: Primary | ICD-10-CM

## 2024-06-19 DIAGNOSIS — E78.2 MIXED HYPERLIPIDEMIA: ICD-10-CM

## 2024-06-19 DIAGNOSIS — R79.9 ABNORMAL FINDING OF BLOOD CHEMISTRY, UNSPECIFIED: ICD-10-CM

## 2024-06-19 DIAGNOSIS — I10 PRIMARY HYPERTENSION: ICD-10-CM

## 2024-06-19 DIAGNOSIS — M17.0 PRIMARY OSTEOARTHRITIS OF BOTH KNEES: ICD-10-CM

## 2024-06-19 DIAGNOSIS — I70.0 ATHEROSCLEROSIS OF AORTA: ICD-10-CM

## 2024-06-19 LAB
ALBUMIN SERPL BCP-MCNC: 3.8 G/DL (ref 3.5–5.2)
ALP SERPL-CCNC: 54 U/L (ref 55–135)
ALT SERPL W/O P-5'-P-CCNC: 12 U/L (ref 10–44)
ANION GAP SERPL CALC-SCNC: 12 MMOL/L (ref 8–16)
AST SERPL-CCNC: 22 U/L (ref 10–40)
BASOPHILS # BLD AUTO: 0.02 K/UL (ref 0–0.2)
BASOPHILS NFR BLD: 0.3 % (ref 0–1.9)
BILIRUB SERPL-MCNC: 0.6 MG/DL (ref 0.1–1)
BUN SERPL-MCNC: 16 MG/DL (ref 8–23)
CALCIUM SERPL-MCNC: 9.4 MG/DL (ref 8.7–10.5)
CHLORIDE SERPL-SCNC: 106 MMOL/L (ref 95–110)
CHOLEST SERPL-MCNC: 135 MG/DL (ref 120–199)
CHOLEST/HDLC SERPL: 2.9 {RATIO} (ref 2–5)
CO2 SERPL-SCNC: 24 MMOL/L (ref 23–29)
CREAT SERPL-MCNC: 0.9 MG/DL (ref 0.5–1.4)
DIFFERENTIAL METHOD BLD: ABNORMAL
EOSINOPHIL # BLD AUTO: 0.2 K/UL (ref 0–0.5)
EOSINOPHIL NFR BLD: 2.8 % (ref 0–8)
ERYTHROCYTE [DISTWIDTH] IN BLOOD BY AUTOMATED COUNT: 13 % (ref 11.5–14.5)
EST. GFR  (NO RACE VARIABLE): >60 ML/MIN/1.73 M^2
ESTIMATED AVG GLUCOSE: 103 MG/DL (ref 68–131)
GLUCOSE SERPL-MCNC: 111 MG/DL (ref 70–110)
HBA1C MFR BLD: 5.2 % (ref 4–5.6)
HCT VFR BLD AUTO: 43 % (ref 40–54)
HDLC SERPL-MCNC: 47 MG/DL (ref 40–75)
HDLC SERPL: 34.8 % (ref 20–50)
HGB BLD-MCNC: 14.2 G/DL (ref 14–18)
IMM GRANULOCYTES # BLD AUTO: 0.02 K/UL (ref 0–0.04)
IMM GRANULOCYTES NFR BLD AUTO: 0.3 % (ref 0–0.5)
LDLC SERPL CALC-MCNC: 68.2 MG/DL (ref 63–159)
LYMPHOCYTES # BLD AUTO: 1 K/UL (ref 1–4.8)
LYMPHOCYTES NFR BLD: 17.6 % (ref 18–48)
MCH RBC QN AUTO: 31 PG (ref 27–31)
MCHC RBC AUTO-ENTMCNC: 33 G/DL (ref 32–36)
MCV RBC AUTO: 94 FL (ref 82–98)
MONOCYTES # BLD AUTO: 0.6 K/UL (ref 0.3–1)
MONOCYTES NFR BLD: 9.6 % (ref 4–15)
NEUTROPHILS # BLD AUTO: 4 K/UL (ref 1.8–7.7)
NEUTROPHILS NFR BLD: 69.4 % (ref 38–73)
NONHDLC SERPL-MCNC: 88 MG/DL
NRBC BLD-RTO: 0 /100 WBC
PLATELET # BLD AUTO: 258 K/UL (ref 150–450)
PMV BLD AUTO: 9 FL (ref 9.2–12.9)
POTASSIUM SERPL-SCNC: 4.4 MMOL/L (ref 3.5–5.1)
PROT SERPL-MCNC: 7.3 G/DL (ref 6–8.4)
RBC # BLD AUTO: 4.58 M/UL (ref 4.6–6.2)
SODIUM SERPL-SCNC: 142 MMOL/L (ref 136–145)
T4 FREE SERPL-MCNC: 0.8 NG/DL (ref 0.71–1.51)
TRIGL SERPL-MCNC: 99 MG/DL (ref 30–150)
TSH SERPL DL<=0.005 MIU/L-ACNC: 0.39 UIU/ML (ref 0.4–4)
WBC # BLD AUTO: 5.73 K/UL (ref 3.9–12.7)

## 2024-06-19 PROCEDURE — 3288F FALL RISK ASSESSMENT DOCD: CPT | Mod: CPTII,S$GLB,, | Performed by: STUDENT IN AN ORGANIZED HEALTH CARE EDUCATION/TRAINING PROGRAM

## 2024-06-19 PROCEDURE — 84443 ASSAY THYROID STIM HORMONE: CPT | Performed by: STUDENT IN AN ORGANIZED HEALTH CARE EDUCATION/TRAINING PROGRAM

## 2024-06-19 PROCEDURE — 99214 OFFICE O/P EST MOD 30 MIN: CPT | Mod: S$GLB,,, | Performed by: STUDENT IN AN ORGANIZED HEALTH CARE EDUCATION/TRAINING PROGRAM

## 2024-06-19 PROCEDURE — 83036 HEMOGLOBIN GLYCOSYLATED A1C: CPT | Performed by: STUDENT IN AN ORGANIZED HEALTH CARE EDUCATION/TRAINING PROGRAM

## 2024-06-19 PROCEDURE — 3074F SYST BP LT 130 MM HG: CPT | Mod: CPTII,S$GLB,, | Performed by: STUDENT IN AN ORGANIZED HEALTH CARE EDUCATION/TRAINING PROGRAM

## 2024-06-19 PROCEDURE — 80053 COMPREHEN METABOLIC PANEL: CPT | Performed by: STUDENT IN AN ORGANIZED HEALTH CARE EDUCATION/TRAINING PROGRAM

## 2024-06-19 PROCEDURE — 1126F AMNT PAIN NOTED NONE PRSNT: CPT | Mod: CPTII,S$GLB,, | Performed by: STUDENT IN AN ORGANIZED HEALTH CARE EDUCATION/TRAINING PROGRAM

## 2024-06-19 PROCEDURE — 3078F DIAST BP <80 MM HG: CPT | Mod: CPTII,S$GLB,, | Performed by: STUDENT IN AN ORGANIZED HEALTH CARE EDUCATION/TRAINING PROGRAM

## 2024-06-19 PROCEDURE — 85025 COMPLETE CBC W/AUTO DIFF WBC: CPT | Performed by: STUDENT IN AN ORGANIZED HEALTH CARE EDUCATION/TRAINING PROGRAM

## 2024-06-19 PROCEDURE — 1101F PT FALLS ASSESS-DOCD LE1/YR: CPT | Mod: CPTII,S$GLB,, | Performed by: STUDENT IN AN ORGANIZED HEALTH CARE EDUCATION/TRAINING PROGRAM

## 2024-06-19 PROCEDURE — 80061 LIPID PANEL: CPT | Performed by: STUDENT IN AN ORGANIZED HEALTH CARE EDUCATION/TRAINING PROGRAM

## 2024-06-19 PROCEDURE — 84439 ASSAY OF FREE THYROXINE: CPT | Performed by: STUDENT IN AN ORGANIZED HEALTH CARE EDUCATION/TRAINING PROGRAM

## 2024-06-19 PROCEDURE — 1159F MED LIST DOCD IN RCRD: CPT | Mod: CPTII,S$GLB,, | Performed by: STUDENT IN AN ORGANIZED HEALTH CARE EDUCATION/TRAINING PROGRAM

## 2024-06-19 PROCEDURE — 99999 PR PBB SHADOW E&M-EST. PATIENT-LVL IV: CPT | Mod: PBBFAC,,, | Performed by: STUDENT IN AN ORGANIZED HEALTH CARE EDUCATION/TRAINING PROGRAM

## 2024-06-19 PROCEDURE — G2211 COMPLEX E/M VISIT ADD ON: HCPCS | Mod: S$GLB,,, | Performed by: STUDENT IN AN ORGANIZED HEALTH CARE EDUCATION/TRAINING PROGRAM

## 2024-06-19 PROCEDURE — 36415 COLL VENOUS BLD VENIPUNCTURE: CPT | Mod: PO | Performed by: STUDENT IN AN ORGANIZED HEALTH CARE EDUCATION/TRAINING PROGRAM

## 2024-06-19 RX ORDER — HYDROCODONE BITARTRATE AND ACETAMINOPHEN 7.5; 325 MG/1; MG/1
TABLET ORAL
Qty: 90 TABLET | Refills: 0 | Status: SHIPPED | OUTPATIENT
Start: 2024-06-19

## 2024-06-19 RX ORDER — MELOXICAM 15 MG/1
15 TABLET ORAL DAILY PRN
Qty: 15 TABLET | Refills: 5 | Status: SHIPPED | OUTPATIENT
Start: 2024-06-19 | End: 2024-07-04

## 2024-06-19 NOTE — PROGRESS NOTES
[facility] Note    Subjective:    The HPI and pertinent ROS is included in the Diagnostic Impression Remarks section at the end of the note. Please see below for further details. Chief complaint is at end of note.     Tha is a pleasant intelligent patient who is here for evaluation.     Modified Medications    Modified Medication Previous Medication    HYDROCODONE-ACETAMINOPHEN (NORCO) 7.5-325 MG PER TABLET HYDROcodone-acetaminophen (NORCO) 7.5-325 mg per tablet       1 tablet PO TID prn severe pain    1 tablet PO TID prn severe pain       Data reviewed 274}  Previous medical records reviewed and summarized in plan section at end of note.      If you are due for any health screening(s) below please notify me so we can arrange them to be ordered and scheduled. Most healthy patients at your age complete them, but you are free to accept or refuse. If you can't do it, I'll definitely understand. If you can, I'd certainly appreciate it!     All of your core healthy metrics are met.      The following portions of the patient's history were reviewed and updated as appropriate: allergies, current medications, past family history, past medical history, past social history, past surgical history and problem list.    He  has a past medical history of Arthritis, Elevated PSA, Essential hypertension, benign, Mixed hyperlipidemia, Prostatitis, unspecified, and Wears glasses.  He  has a past surgical history that includes Knee arthroscopy w/ meniscal repair (Left); Tonsillectomy; Hemorrhoid surgery; cataracts; Colonoscopy (N/A, 8/14/2019); and Colonoscopy (N/A, 8/2/2023).    He  reports that he quit smoking about 16 years ago. His smoking use included cigarettes. He started smoking about 56 years ago. He has a 40 pack-year smoking history. He has been exposed to tobacco smoke. He has never used smokeless tobacco. He reports current alcohol use of about 5.0 standard drinks of alcohol per week. He reports that he  "does not use drugs.  He family history includes Cancer in his brother and paternal uncle.    Review of patient's allergies indicates:   Allergen Reactions    Aminoglycosides Other (See Comments)     Lips swelled    Meperidine Itching    Penicillins Itching       Tobacco Use: Medium Risk (6/19/2024)    Patient History     Smoking Tobacco Use: Former     Smokeless Tobacco Use: Never     Passive Exposure: Past     Physical Examination  General appearance: alert, cooperative, no distress  Neck: no thyromegaly, no neck stiffness  Lungs: clear to auscultation, no wheezes, rales or rhonchi, symmetric air entry  Heart: normal rate, regular rhythm, normal S1, S2, no murmurs, rubs, clicks or gallops  Abdomen: soft, nontender, nondistended, no rigidity, rebound, or guarding.   Back: no point tenderness over spine  Extremities: peripheral pulses normal, no unilateral leg swelling or calf tenderness   Neurological:alert, oriented, normal speech, no new focal findings or movement disorder noted from baseline    BP Readings from Last 3 Encounters:   06/19/24 114/72   12/07/23 120/80   08/02/23 127/77     Wt Readings from Last 3 Encounters:   06/19/24 67 kg (147 lb 11.3 oz)   06/10/24 73 kg (160 lb 15 oz)   12/07/23 73 kg (160 lb 15 oz)     /72 (BP Location: Right arm, Patient Position: Sitting, BP Method: Large (Manual))   Pulse 93   Temp 98.5 °F (36.9 °C) (Oral)   Resp 17   Ht 5' 3" (1.6 m)   Wt 67 kg (147 lb 11.3 oz)   SpO2 96%   BMI 26.17 kg/m²    274}  Laboratory: I have reviewed old labs below:    274}    Lab Results   Component Value Date    WBC 4.94 05/09/2023    HGB 14.5 05/09/2023    HCT 42.6 05/09/2023    MCV 90 05/09/2023     05/09/2023     05/09/2023    K 4.0 05/09/2023     05/09/2023    CALCIUM 9.8 05/09/2023    CO2 24 05/09/2023    GLU 99 05/09/2023    BUN 16 05/09/2023    CREATININE 0.8 05/09/2023    EGFRNORACEVR >60.0 05/09/2023    ANIONGAP 8 05/09/2023    PROT 7.1 05/09/2023    " "ALBUMIN 4.0 05/09/2023    BILITOT 0.7 05/09/2023    ALKPHOS 48 (L) 05/09/2023    ALT 15 05/09/2023    AST 26 05/09/2023    INR 1.0 04/06/2016    CHOL 155 05/09/2023    TRIG 85 05/09/2023    HDL 51 05/09/2023    LDLCALC 87.0 05/09/2023    TSH 1.255 10/04/2018    PSA 3.69 06/11/2013    HGBA1C 5.0 05/09/2023      Lab reviewed by me: Particular labs of significance that I will monitor, workup, or treat to improve are mentioned below in diagnostic impression remarks.    Imaging/EKG: I have reviewed the pertinent results and my findings are noted in remarks.  274}    CC:   Chief Complaint   Patient presents with    Follow-up    Hypertension        274}    Assessment/Plan  Tha Guzman is a 84 y.o. male who presents to clinic with:  1. Healthcare maintenance    2. Mixed hyperlipidemia    3. Primary hypertension    4. Atherosclerosis of aorta    5. Abnormal finding of blood chemistry, unspecified    6. Primary osteoarthritis of both knees       274}  Diagnostic Impression Remarks + HPI     Documentation entered by me for this encounter may have been done in part using speech-recognition technology. Although I have made an effort to ensure accuracy, "sound like" errors may exist and should be interpreted in context.       Hypertension -stable continue current meds monitor no headache or chest pain f/u blood work   HLD stable continue current meds monitor blood work rec mediterranean diet    Osteoarthritis needs improvement takes ibuprofen also takes norco for breakthrough failed injections no side effects understands the side effects   Atherosclerosis of aorta- stable continue current medications and rec healthy diet monitor lipid levels       The patient is meeting the goals of opioid therapy and is dependent on them for functionality and can not perform ADLS without them. Regarding opioids, the risks were discussed including tolerance, addiction, overdose, over-sedation, drug interactions, liver damage, hormone " imbalance, dental decay, opioid-induced hyperalgesia, and even death. I cautioned the patient that the medications that are being taken are dangerous and can result in death from overdose. They can also result in death if taken by someone else without medical supervision. I also warned the patient to not drive or operate heavy machinery while taking these medications. I discussed the use of narcan if needed and he verbalized understanding. The patient agreed to teach a family member living in the same home on how and when to use it if the scenario of a drug overdose occurs. Shared decision making occurred and he agreed with this treatment plan.     I discussed with him the proper use of narcotics, including storing the narcotics in a safe and locked place and promptly disposing any unused narcotics once he no longer needs them.     We specifically discussed non-opioid alternatives and patient offered lower dose of opioid medication.    He has no suicidal thoughts or intent and he has no weapons access and he is future oriented with support in the community and I emphasized that if he ever develops thoughts of hurting he to directly go to the emergency department which he agreed to and he is willing to have close follow up with me.     was reviewed today (06/19/2024) and determined to be appropriate for continued opioid therapy.    The addictive potential of the medications were discussed and he agrees to inform us or the PCP if any compulsion to taking his medications for any reason other than for pain arises. He agrees to safe-guard all medications from unintentional or intentional use or misuse by other persons who may potentially have access to their premises, including but not limited to significant others, children, parents, friends, and even strangers. He takes sole responsibility for any consequences that may occur from not doing so.    This is the extent of this pleasant patient's concerns at this present  time. He did not feel chest pain upon exertion, dyspnea, nausea, vomiting, diaphoresis, or syncope. No pleuritic chest pain, unilateral leg swelling, calf tenderness, or calf pain. Negative for unintentional weight loss night sweats, hematuria, and fevers.     Additional workup planned: see labs ordered below.    See below for labs and meds ordered with associated diagnosis      1. Healthcare maintenance    2. Mixed hyperlipidemia  - CBC Auto Differential; Future  - Comprehensive Metabolic Panel; Future  - Hemoglobin A1C; Future  - TSH; Future  - Lipid Panel; Future    3. Primary hypertension  - CBC Auto Differential; Future  - Comprehensive Metabolic Panel; Future  - Hemoglobin A1C; Future  - TSH; Future  - Lipid Panel; Future    4. Atherosclerosis of aorta  - CBC Auto Differential; Future  - Comprehensive Metabolic Panel; Future  - Hemoglobin A1C; Future  - TSH; Future  - Lipid Panel; Future    5. Abnormal finding of blood chemistry, unspecified  - CBC Auto Differential; Future  - Comprehensive Metabolic Panel; Future  - Hemoglobin A1C; Future  - TSH; Future  - Lipid Panel; Future    6. Primary osteoarthritis of both knees  - HYDROcodone-acetaminophen (NORCO) 7.5-325 mg per tablet; 1 tablet PO TID prn severe pain  Dispense: 90 tablet; Refill: 0  - meloxicam (MOBIC) 15 MG tablet; Take 1 tablet (15 mg total) by mouth daily as needed for Pain.  Dispense: 15 tablet; Refill: 5      Bart Burrows MD   274}    If you are due for any health screening(s) below please notify me so we can arrange them to be ordered and scheduled. Most healthy patients at your age complete them, but you are free to accept or refuse.     If you can't do it, I'll definitely understand. If you can, I'd certainly appreciate it!   All of your core healthy metrics are met.

## 2024-06-20 ENCOUNTER — PATIENT MESSAGE (OUTPATIENT)
Dept: FAMILY MEDICINE | Facility: CLINIC | Age: 84
End: 2024-06-20
Payer: MEDICARE

## 2024-06-20 ENCOUNTER — TELEPHONE (OUTPATIENT)
Dept: FAMILY MEDICINE | Facility: CLINIC | Age: 84
End: 2024-06-20
Payer: MEDICARE

## 2024-06-20 DIAGNOSIS — R94.6 ABNORMAL RESULTS OF THYROID FUNCTION STUDIES: ICD-10-CM

## 2024-06-20 DIAGNOSIS — R79.9 ABNORMAL FINDING OF BLOOD CHEMISTRY, UNSPECIFIED: Primary | ICD-10-CM

## 2024-06-20 NOTE — TELEPHONE ENCOUNTER
----- Message from Bart Burrows MD sent at 6/20/2024  6:45 AM CDT -----  Thyroid test was slightly abnormal like to recheck this in 2 months

## 2024-07-04 DIAGNOSIS — I10 ESSENTIAL HYPERTENSION: ICD-10-CM

## 2024-07-04 DIAGNOSIS — E78.5 HYPERLIPIDEMIA, UNSPECIFIED HYPERLIPIDEMIA TYPE: ICD-10-CM

## 2024-07-04 RX ORDER — AMLODIPINE AND BENAZEPRIL HYDROCHLORIDE 5; 20 MG/1; MG/1
CAPSULE ORAL
Qty: 90 CAPSULE | Refills: 3 | Status: SHIPPED | OUTPATIENT
Start: 2024-07-04

## 2024-07-04 RX ORDER — SIMVASTATIN 20 MG/1
TABLET, FILM COATED ORAL
Qty: 90 TABLET | Refills: 3 | Status: SHIPPED | OUTPATIENT
Start: 2024-07-04

## 2024-07-04 NOTE — TELEPHONE ENCOUNTER
No care due was identified.  [facility] Embedded Care Due Messages. Reference number: [id number].   7/04/2024 12:11:58 AM CDT

## 2024-07-05 NOTE — TELEPHONE ENCOUNTER
Refill Decision Note   Tha Guzman  is requesting a refill authorization.  Brief Assessment and Rationale for Refill:  Approve     Medication Therapy Plan:        Comments:     Note composed:10:13 PM 07/04/2024

## 2024-08-12 ENCOUNTER — TELEPHONE (OUTPATIENT)
Dept: DERMATOLOGY | Facility: CLINIC | Age: 84
End: 2024-08-12
Payer: MEDICARE

## 2024-08-12 NOTE — TELEPHONE ENCOUNTER
----- Message from Blu Petty sent at 8/12/2024  4:08 PM CDT -----  Type: Needs Medical Advice  Who Called:  pt  Best Call Back Number: [phone]    Additional Information: Pt is calling the office returning call back to the nurse.Please call back and advise.

## 2024-08-21 ENCOUNTER — LAB VISIT (OUTPATIENT)
Dept: LAB | Facility: HOSPITAL | Age: 84
End: 2024-08-21
Attending: STUDENT IN AN ORGANIZED HEALTH CARE EDUCATION/TRAINING PROGRAM
Payer: MEDICARE

## 2024-08-21 DIAGNOSIS — R79.9 ABNORMAL FINDING OF BLOOD CHEMISTRY, UNSPECIFIED: ICD-10-CM

## 2024-08-21 DIAGNOSIS — R94.6 ABNORMAL RESULTS OF THYROID FUNCTION STUDIES: ICD-10-CM

## 2024-08-21 LAB — TSH SERPL DL<=0.005 MIU/L-ACNC: 1.73 UIU/ML (ref 0.4–4)

## 2024-08-21 PROCEDURE — 84443 ASSAY THYROID STIM HORMONE: CPT | Performed by: STUDENT IN AN ORGANIZED HEALTH CARE EDUCATION/TRAINING PROGRAM

## 2024-08-21 PROCEDURE — 36415 COLL VENOUS BLD VENIPUNCTURE: CPT | Mod: PO | Performed by: STUDENT IN AN ORGANIZED HEALTH CARE EDUCATION/TRAINING PROGRAM

## 2024-08-23 ENCOUNTER — OFFICE VISIT (OUTPATIENT)
Dept: DERMATOLOGY | Facility: CLINIC | Age: 84
End: 2024-08-23
Payer: MEDICARE

## 2024-08-23 VITALS — WEIGHT: 147.69 LBS | HEIGHT: 63 IN | BODY MASS INDEX: 26.17 KG/M2

## 2024-08-23 DIAGNOSIS — L57.0 ACTINIC KERATOSES: Primary | ICD-10-CM

## 2024-08-23 DIAGNOSIS — Z12.83 SKIN CANCER SCREENING: ICD-10-CM

## 2024-08-23 DIAGNOSIS — L82.1 SEBORRHEIC KERATOSES: ICD-10-CM

## 2024-08-23 DIAGNOSIS — L82.0 INFLAMED SEBORRHEIC KERATOSIS: ICD-10-CM

## 2024-08-23 RX ORDER — MIRABEGRON 50 MG/1
1 TABLET, FILM COATED, EXTENDED RELEASE ORAL
COMMUNITY
Start: 2024-07-31

## 2024-08-23 NOTE — PATIENT INSTRUCTIONS

## 2024-08-23 NOTE — PROGRESS NOTES
Subjective:      Patient ID:  Tha Guzman is a 84 y.o. male who presents for   Chief Complaint   Patient presents with    Skin Check     UBSE    Spot     nose     LOV 2016    Skin Check - UBSE    Past folliculitis scaslp, H&S with zinc controls    C/o spot on nose  Rough    Erm Hx:  Denies phx NMSC/MM  Denies fhx MM    Current Outpatient Medications:   ·  alfuzosin (UROXATRAL) 10 mg Tb24, TAKE 1 TABLET (10 MG TOTAL) BY MOUTH DAILY WITH BREAKFAST., Disp: 90 tablet, Rfl: 3  ·  amlodipine-benazepril 5-20 mg (LOTREL) 5-20 mg per capsule, TAKE 1 CAPSULE BY MOUTH EVERY DAY, Disp: 90 capsule, Rfl: 3  ·  aspirin (ECOTRIN) 81 MG EC tablet, Take 81 mg by mouth once daily., Disp: , Rfl:   ·  HYDROcodone-acetaminophen (NORCO) 7.5-325 mg per tablet, 1 tablet PO TID prn severe pain, Disp: 90 tablet, Rfl: 0  ·  MYRBETRIQ 50 mg Tb24, Take 1 tablet by mouth., Disp: , Rfl:   ·  simvastatin (ZOCOR) 20 MG tablet, TAKE 1 TABLET BY MOUTH EVERY DAY IN THE EVENING, Disp: 90 tablet, Rfl: 3  ·  traZODone (DESYREL) 100 MG tablet, Take 1 tablet (100 mg total) by mouth nightly as needed for Insomnia., Disp: 90 tablet, Rfl: 3        Review of Systems   Constitutional:  Negative for fever and chills.   Skin:  Negative for itching, rash (scalp), daily sunscreen use, activity-related sunscreen use, recent sunburn and wears hat.       Objective:   Physical Exam   Constitutional: He appears well-developed and well-nourished. No distress.   Neurological: He is alert and oriented to person, place, and time. He is not disoriented.   Psychiatric: He has a normal mood and affect.   Skin:   Areas Examined (abnormalities noted in diagram):   Scalp / Hair Palpated and Inspected  Head / Face Inspection Performed  Neck Inspection Performed  Chest / Axilla Inspection Performed  Abdomen Inspection Performed  Back Inspection Performed  RUE Inspected  LUE Inspection Performed  Nails and Digits Inspection Performed                     Diagram Legend      Erythematous scaling macule/papule c/w actinic keratosis       Vascular papule c/w angioma      Pigmented verrucoid papule/plaque c/w seborrheic keratosis      Yellow umbilicated papule c/w sebaceous hyperplasia      Irregularly shaped tan macule c/w lentigo     1-2 mm smooth white papules consistent with Milia      Movable subcutaneous cyst with punctum c/w epidermal inclusion cyst      Subcutaneous movable cyst c/w pilar cyst      Firm pink to brown papule c/w dermatofibroma      Pedunculated fleshy papule(s) c/w skin tag(s)      Evenly pigmented macule c/w junctional nevus     Mildly variegated pigmented, slightly irregular-bordered macule c/w mildly atypical nevus      Flesh colored to evenly pigmented papule c/w intradermal nevus       Pink pearly papule/plaque c/w basal cell carcinoma      Erythematous hyperkeratotic cursted plaque c/w SCC      Surgical scar with no sign of skin cancer recurrence      Open and closed comedones      Inflammatory papules and pustules      Verrucoid papule consistent consistent with wart     Erythematous eczematous patches and plaques     Dystrophic onycholytic nail with subungual debris c/w onychomycosis     Umbilicated papule    Erythematous-base heme-crusted tan verrucoid plaque consistent with inflamed seborrheic keratosis     Erythematous Silvery Scaling Plaque c/w Psoriasis     See annotation      Assessment / Plan:        Actinic keratoses  Cryosurgery Procedure Note    Verbal consent from the patient is obtained and the patient is aware of the precancerous quality and need for treatment of these lesions. Liquid nitrogen cryosurgery is applied to the 1 actinic keratoses, as detailed in the physical exam, to produce a freeze injury. The patient is aware that blisters may form and is instructed on wound care with gentle cleansing and use of vaseline ointment to keep moist until healed. The patient is supplied a handout on cryosurgery and is instructed to call if lesions do  not completely resolve.    Inflamed seborrheic keratoses  Verbal consent obtained. 2 lesion(s) destroyed with electrodesiccation after anesthesia with 1% lidocaine with epinephrine. No complications.    Seborrheic keratoses  These are benign inherited growths without a malignant potential. Reassurance given to patient. No treatment is necessary.     Skin cancer screening  Upper body skin examination performed today including at least 9 points as noted in physical examination. No lesions suspicious for malignancy noted.    Patient instructed in importance in daily broad spectrum sun protection of at least spf 30. Mineral sunscreen ingredients preferred (Zinc +/- Titanium) and can be found OTC.   Patient encouraged to wear hat for all outdoor exposure.   Also discussed sun avoidance and use of protective clothing.           Follow up if symptoms worsen or fail to improve.

## 2024-09-09 ENCOUNTER — PATIENT MESSAGE (OUTPATIENT)
Dept: UROLOGY | Facility: CLINIC | Age: 84
End: 2024-09-09
Payer: MEDICARE

## 2024-09-10 ENCOUNTER — OFFICE VISIT (OUTPATIENT)
Dept: UROLOGY | Facility: CLINIC | Age: 84
End: 2024-09-10
Payer: MEDICARE

## 2024-09-10 DIAGNOSIS — N13.8 BPH WITH OBSTRUCTION/LOWER URINARY TRACT SYMPTOMS: Primary | ICD-10-CM

## 2024-09-10 DIAGNOSIS — N40.1 BPH WITH OBSTRUCTION/LOWER URINARY TRACT SYMPTOMS: Primary | ICD-10-CM

## 2024-09-10 PROCEDURE — 99443 PR PHYSICIAN TELEPHONE EVALUATION 21-30 MIN: CPT | Mod: 95,,, | Performed by: STUDENT IN AN ORGANIZED HEALTH CARE EDUCATION/TRAINING PROGRAM

## 2024-09-10 PROCEDURE — 1160F RVW MEDS BY RX/DR IN RCRD: CPT | Mod: CPTII,95,, | Performed by: STUDENT IN AN ORGANIZED HEALTH CARE EDUCATION/TRAINING PROGRAM

## 2024-09-10 PROCEDURE — 1159F MED LIST DOCD IN RCRD: CPT | Mod: CPTII,95,, | Performed by: STUDENT IN AN ORGANIZED HEALTH CARE EDUCATION/TRAINING PROGRAM

## 2024-09-10 NOTE — PROGRESS NOTES
Established Patient - Audio Only Telehealth Visit     The patient location is: home  The chief complaint leading to consultation is: BPH  Visit type: Virtual visit with audio only (telephone)  Total time spent with patient: 21 mins       The reason for the audio only service rather than synchronous audio and video virtual visit was related to technical difficulties or patient preference/necessity.     Each patient to whom I provide medical services by telemedicine is:  (1) informed of the relationship between the physician and patient and the respective role of any other health care provider with respect to management of the patient; and (2) notified that they may decline to receive medical services by telemedicine and may withdraw from such care at any time. Patient verbally consented to receive this service via voice-only telephone call.       HPI:   Patient is currently on Uroxatral 10 mg.   He tried Myrbetriq 50 mg but it did not help and he has stopped this all together.     Still having slight urge incontinence.   No UTIs.   He is mildly bothered by these symptoms but feels as though he can live with them.      Assessment and plan:    - Discussed option of further workup with cysto/TRUS  - He is going to think about this. Worried about doing a procedure that may worsen incontinence.   - If he is doing well then can follow up in 1 year  - He will message me if he wants to schedule procedure   - Continue Uroxatral 10 mg      Emy Pompa MD  Urology Department       This service was not originating from a related E/M service provided within the previous 7 days nor will  to an E/M service or procedure within the next 24 hours or my soonest available appointment.  Prevailing standard of care was able to be met in this audio-only visit.          
no

## 2024-09-21 DIAGNOSIS — G47.00 INSOMNIA, UNSPECIFIED TYPE: ICD-10-CM

## 2024-09-21 NOTE — TELEPHONE ENCOUNTER
No care due was identified.  [facility] Embedded Care Due Messages. Reference number: [id number].   9/21/2024 8:54:12 AM CDT

## 2024-09-22 RX ORDER — TRAZODONE HYDROCHLORIDE 100 MG/1
100 TABLET ORAL NIGHTLY
Qty: 90 TABLET | Refills: 2 | Status: SHIPPED | OUTPATIENT
Start: 2024-09-22

## 2024-09-22 NOTE — TELEPHONE ENCOUNTER
Refill Decision Note   Tha Guzman  is requesting a refill authorization.  Brief Assessment and Rationale for Refill:  Approve     Medication Therapy Plan:        Comments:     Note composed:9:08 AM 09/22/2024

## 2024-11-05 RX ORDER — ALFUZOSIN HYDROCHLORIDE 10 MG/1
10 TABLET, EXTENDED RELEASE ORAL
Qty: 90 TABLET | Refills: 3 | Status: SHIPPED | OUTPATIENT
Start: 2024-11-05 | End: 2025-11-05

## 2025-01-17 ENCOUNTER — LAB VISIT (OUTPATIENT)
Dept: LAB | Facility: HOSPITAL | Age: 85
End: 2025-01-17
Attending: STUDENT IN AN ORGANIZED HEALTH CARE EDUCATION/TRAINING PROGRAM
Payer: MEDICARE

## 2025-01-17 ENCOUNTER — OFFICE VISIT (OUTPATIENT)
Dept: FAMILY MEDICINE | Facility: CLINIC | Age: 85
End: 2025-01-17
Payer: MEDICARE

## 2025-01-17 VITALS
DIASTOLIC BLOOD PRESSURE: 82 MMHG | SYSTOLIC BLOOD PRESSURE: 130 MMHG | OXYGEN SATURATION: 96 % | TEMPERATURE: 98 F | HEIGHT: 63 IN | WEIGHT: 151 LBS | HEART RATE: 89 BPM | BODY MASS INDEX: 26.75 KG/M2 | RESPIRATION RATE: 12 BRPM

## 2025-01-17 DIAGNOSIS — E78.2 MIXED HYPERLIPIDEMIA: ICD-10-CM

## 2025-01-17 DIAGNOSIS — Z00.00 HEALTHCARE MAINTENANCE: Primary | ICD-10-CM

## 2025-01-17 DIAGNOSIS — R79.9 ABNORMAL FINDING OF BLOOD CHEMISTRY, UNSPECIFIED: ICD-10-CM

## 2025-01-17 DIAGNOSIS — N40.0 BENIGN PROSTATIC HYPERPLASIA, UNSPECIFIED WHETHER LOWER URINARY TRACT SYMPTOMS PRESENT: ICD-10-CM

## 2025-01-17 DIAGNOSIS — I10 PRIMARY HYPERTENSION: ICD-10-CM

## 2025-01-17 DIAGNOSIS — M17.0 PRIMARY OSTEOARTHRITIS OF BOTH KNEES: ICD-10-CM

## 2025-01-17 DIAGNOSIS — G47.00 INSOMNIA, UNSPECIFIED TYPE: ICD-10-CM

## 2025-01-17 DIAGNOSIS — Z23 NEED FOR VACCINATION: ICD-10-CM

## 2025-01-17 LAB
ALBUMIN SERPL BCP-MCNC: 3.9 G/DL (ref 3.5–5.2)
ALP SERPL-CCNC: 124 U/L (ref 40–150)
ALT SERPL W/O P-5'-P-CCNC: 817 U/L (ref 10–44)
ANION GAP SERPL CALC-SCNC: 12 MMOL/L (ref 8–16)
AST SERPL-CCNC: 335 U/L (ref 10–40)
BILIRUB SERPL-MCNC: 1.1 MG/DL (ref 0.1–1)
BUN SERPL-MCNC: 14 MG/DL (ref 8–23)
CALCIUM SERPL-MCNC: 9.5 MG/DL (ref 8.7–10.5)
CHLORIDE SERPL-SCNC: 105 MMOL/L (ref 95–110)
CHOLEST SERPL-MCNC: 127 MG/DL (ref 120–199)
CHOLEST/HDLC SERPL: 3 {RATIO} (ref 2–5)
CO2 SERPL-SCNC: 23 MMOL/L (ref 23–29)
CREAT SERPL-MCNC: 0.9 MG/DL (ref 0.5–1.4)
ERYTHROCYTE [DISTWIDTH] IN BLOOD BY AUTOMATED COUNT: 13.2 % (ref 11.5–14.5)
EST. GFR  (NO RACE VARIABLE): >60 ML/MIN/1.73 M^2
ESTIMATED AVG GLUCOSE: 91 MG/DL (ref 68–131)
GLUCOSE SERPL-MCNC: 93 MG/DL (ref 70–110)
HBA1C MFR BLD: 4.8 % (ref 4–5.6)
HCT VFR BLD AUTO: 43.9 % (ref 40–54)
HDLC SERPL-MCNC: 43 MG/DL (ref 40–75)
HDLC SERPL: 33.9 % (ref 20–50)
HGB BLD-MCNC: 14.6 G/DL (ref 14–18)
LDLC SERPL CALC-MCNC: 62.2 MG/DL (ref 63–159)
MCH RBC QN AUTO: 31.2 PG (ref 27–31)
MCHC RBC AUTO-ENTMCNC: 33.3 G/DL (ref 32–36)
MCV RBC AUTO: 94 FL (ref 82–98)
NONHDLC SERPL-MCNC: 84 MG/DL
PLATELET # BLD AUTO: 214 K/UL (ref 150–450)
PMV BLD AUTO: 8.9 FL (ref 9.2–12.9)
POTASSIUM SERPL-SCNC: 4.1 MMOL/L (ref 3.5–5.1)
PROT SERPL-MCNC: 7.8 G/DL (ref 6–8.4)
RBC # BLD AUTO: 4.68 M/UL (ref 4.6–6.2)
SODIUM SERPL-SCNC: 140 MMOL/L (ref 136–145)
TRIGL SERPL-MCNC: 109 MG/DL (ref 30–150)
WBC # BLD AUTO: 5.63 K/UL (ref 3.9–12.7)

## 2025-01-17 PROCEDURE — 85027 COMPLETE CBC AUTOMATED: CPT | Performed by: STUDENT IN AN ORGANIZED HEALTH CARE EDUCATION/TRAINING PROGRAM

## 2025-01-17 PROCEDURE — 3288F FALL RISK ASSESSMENT DOCD: CPT | Mod: CPTII,S$GLB,, | Performed by: STUDENT IN AN ORGANIZED HEALTH CARE EDUCATION/TRAINING PROGRAM

## 2025-01-17 PROCEDURE — 1101F PT FALLS ASSESS-DOCD LE1/YR: CPT | Mod: CPTII,S$GLB,, | Performed by: STUDENT IN AN ORGANIZED HEALTH CARE EDUCATION/TRAINING PROGRAM

## 2025-01-17 PROCEDURE — 83036 HEMOGLOBIN GLYCOSYLATED A1C: CPT | Performed by: STUDENT IN AN ORGANIZED HEALTH CARE EDUCATION/TRAINING PROGRAM

## 2025-01-17 PROCEDURE — 99999 PR PBB SHADOW E&M-EST. PATIENT-LVL III: CPT | Mod: PBBFAC,,, | Performed by: STUDENT IN AN ORGANIZED HEALTH CARE EDUCATION/TRAINING PROGRAM

## 2025-01-17 PROCEDURE — 3075F SYST BP GE 130 - 139MM HG: CPT | Mod: CPTII,S$GLB,, | Performed by: STUDENT IN AN ORGANIZED HEALTH CARE EDUCATION/TRAINING PROGRAM

## 2025-01-17 PROCEDURE — 99214 OFFICE O/P EST MOD 30 MIN: CPT | Mod: S$GLB,,, | Performed by: STUDENT IN AN ORGANIZED HEALTH CARE EDUCATION/TRAINING PROGRAM

## 2025-01-17 PROCEDURE — G0008 ADMIN INFLUENZA VIRUS VAC: HCPCS | Mod: S$GLB,,, | Performed by: STUDENT IN AN ORGANIZED HEALTH CARE EDUCATION/TRAINING PROGRAM

## 2025-01-17 PROCEDURE — 1159F MED LIST DOCD IN RCRD: CPT | Mod: CPTII,S$GLB,, | Performed by: STUDENT IN AN ORGANIZED HEALTH CARE EDUCATION/TRAINING PROGRAM

## 2025-01-17 PROCEDURE — 80053 COMPREHEN METABOLIC PANEL: CPT | Performed by: STUDENT IN AN ORGANIZED HEALTH CARE EDUCATION/TRAINING PROGRAM

## 2025-01-17 PROCEDURE — G2211 COMPLEX E/M VISIT ADD ON: HCPCS | Mod: S$GLB,,, | Performed by: STUDENT IN AN ORGANIZED HEALTH CARE EDUCATION/TRAINING PROGRAM

## 2025-01-17 PROCEDURE — 80061 LIPID PANEL: CPT | Performed by: STUDENT IN AN ORGANIZED HEALTH CARE EDUCATION/TRAINING PROGRAM

## 2025-01-17 PROCEDURE — 90653 IIV ADJUVANT VACCINE IM: CPT | Mod: S$GLB,,, | Performed by: STUDENT IN AN ORGANIZED HEALTH CARE EDUCATION/TRAINING PROGRAM

## 2025-01-17 PROCEDURE — 3079F DIAST BP 80-89 MM HG: CPT | Mod: CPTII,S$GLB,, | Performed by: STUDENT IN AN ORGANIZED HEALTH CARE EDUCATION/TRAINING PROGRAM

## 2025-01-17 PROCEDURE — 1125F AMNT PAIN NOTED PAIN PRSNT: CPT | Mod: CPTII,S$GLB,, | Performed by: STUDENT IN AN ORGANIZED HEALTH CARE EDUCATION/TRAINING PROGRAM

## 2025-01-17 PROCEDURE — 36415 COLL VENOUS BLD VENIPUNCTURE: CPT | Mod: PO | Performed by: STUDENT IN AN ORGANIZED HEALTH CARE EDUCATION/TRAINING PROGRAM

## 2025-01-17 RX ORDER — HYDROCODONE BITARTRATE AND ACETAMINOPHEN 7.5; 325 MG/1; MG/1
TABLET ORAL
Qty: 90 TABLET | Refills: 0 | Status: SHIPPED | OUTPATIENT
Start: 2025-01-17

## 2025-01-17 RX ORDER — TADALAFIL 5 MG/1
5 TABLET ORAL DAILY
Qty: 21 TABLET | Refills: 0 | Status: SHIPPED | OUTPATIENT
Start: 2025-01-17 | End: 2025-02-18

## 2025-01-17 NOTE — PROGRESS NOTES
[facility] Note    Subjective:    The HPI and pertinent ROS is included in the Diagnostic Impression Remarks section at the end of the note. Please see below for further details. Chief complaint is at end of note.     Tha is a pleasant intelligent patient who is here for evaluation.     Modified Medications    Modified Medication Previous Medication    HYDROCODONE-ACETAMINOPHEN (NORCO) 7.5-325 MG PER TABLET HYDROcodone-acetaminophen (NORCO) 7.5-325 mg per tablet       1 tablet PO TID prn severe pain    1 tablet PO TID prn severe pain       Data reviewed 274}  Previous medical records reviewed and summarized in plan section at end of note.      If you are due for any health screening(s) below please notify me so we can arrange them to be ordered and scheduled. Most healthy patients at your age complete them, but you are free to accept or refuse. If you can't do it, I'll definitely understand. If you can, I'd certainly appreciate it!     All of your core healthy metrics are met.      The following portions of the patient's history were reviewed and updated as appropriate: allergies, current medications, past family history, past medical history, past social history, past surgical history and problem list.    He  has a past medical history of Arthritis, Elevated PSA, Essential hypertension, benign, Mixed hyperlipidemia, Prostatitis, unspecified, and Wears glasses.  He  has a past surgical history that includes Knee arthroscopy w/ meniscal repair (Left); Tonsillectomy; Hemorrhoid surgery; cataracts; Colonoscopy (N/A, 8/14/2019); and Colonoscopy (N/A, 8/2/2023).    He  reports that he quit smoking about 17 years ago. His smoking use included cigarettes. He started smoking about 57 years ago. He has a 40 pack-year smoking history. He has been exposed to tobacco smoke. He has never used smokeless tobacco. He reports current alcohol use of about 5.0 standard drinks of alcohol per week. He reports that he  "does not use drugs.  He family history includes Cancer in his brother and paternal uncle.    Review of patient's allergies indicates:   Allergen Reactions    Aminoglycosides Other (See Comments)     Lips swelled    Meperidine Itching    Penicillins Itching       Tobacco Use: Medium Risk (1/17/2025)    Patient History     Smoking Tobacco Use: Former     Smokeless Tobacco Use: Never     Passive Exposure: Past     Physical Examination  Physical Exam       General appearance: alert, cooperative, no distress  Neck: no thyromegaly, no neck stiffness  Lungs: clear to auscultation, no wheezes, rales or rhonchi, symmetric air entry  Heart: normal rate, regular rhythm, normal S1, S2, no murmurs, rubs, clicks or gallops  Abdomen: soft, nontender, nondistended, no rigidity, rebound, or guarding.   Back: no point tenderness over spine  Extremities: peripheral pulses normal, no unilateral leg swelling or calf tenderness   Neurological:alert, oriented, normal speech, no new focal findings or movement disorder noted from baseline      BP Readings from Last 3 Encounters:   01/17/25 130/82   06/19/24 114/72   12/07/23 120/80     Wt Readings from Last 3 Encounters:   01/17/25 68.5 kg (151 lb 0.2 oz)   08/23/24 67 kg (147 lb 11.3 oz)   06/19/24 67 kg (147 lb 11.3 oz)     /82 (BP Location: Right arm, Patient Position: Sitting)   Pulse 89   Temp 97.7 °F (36.5 °C) (Oral)   Resp 12   Ht 5' 3" (1.6 m)   Wt 68.5 kg (151 lb 0.2 oz)   SpO2 96%   BMI 26.75 kg/m²    274}  Laboratory: I have reviewed old labs below:    274}    Lab Results   Component Value Date    WBC 5.73 06/19/2024    HGB 14.2 06/19/2024    HCT 43.0 06/19/2024    MCV 94 06/19/2024     06/19/2024     06/19/2024    K 4.4 06/19/2024     06/19/2024    CALCIUM 9.4 06/19/2024    CO2 24 06/19/2024     (H) 06/19/2024    BUN 16 06/19/2024    CREATININE 0.9 06/19/2024    EGFRNORACEVR >60.0 06/19/2024    ANIONGAP 12 06/19/2024    PROT 7.3 06/19/2024 "    ALBUMIN 3.8 06/19/2024    BILITOT 0.6 06/19/2024    ALKPHOS 54 (L) 06/19/2024    ALT 12 06/19/2024    AST 22 06/19/2024    INR 1.0 04/06/2016    CHOL 135 06/19/2024    TRIG 99 06/19/2024    HDL 47 06/19/2024    LDLCALC 68.2 06/19/2024    TSH 1.731 08/21/2024    PSA 3.69 06/11/2013    HGBA1C 5.2 06/19/2024      Lab reviewed by me: Particular labs of significance that I will monitor, workup, or treat to improve are mentioned below in diagnostic impression remarks.    Results         Imaging/EKG: I have reviewed the pertinent results and my findings are noted in remarks.  274}    CC:   Chief Complaint   Patient presents with    Annual Exam    Hypertension    Hyperlipidemia    Arthritis        274}    History of Present Illness  The patient is an 84-year-old male who presents for evaluation of hypertension, hyperlipidemia, osteoarthritis of the knees, benign prostatic hyperplasia, and medication management.    He has been experiencing persistent pain in his left knee, which he believes may be alleviated through surgical intervention. However, he expresses concern about the potential risk of blood clots post-surgery, particularly in light of his advanced age.    He reports experiencing urinary incontinence, predominantly at night, which he attributes to sleeping through the urge to urinate. He also notes occasional daytime incontinence when attempting to delay urination. He is seeking advice on potential exercises or other interventions that may help manage his symptoms. Despite being prescribed Myrbetriq by his neurologist, he has not found it to be effective. He is currently on alfuzosin for benign prostatic hyperplasia (BPH).    He is fasting today and is requesting labs.    He is requesting a refill of his [place] prescription.    He is requesting influenza vaccine.    MEDICATIONS  Current: Alfuzosin, Norco  Discontinued: Myrbetriq       Assessment/Plan  Tha Guzman is a 84 y.o. male who presents to clinic  with:  1. Healthcare maintenance    2. Primary hypertension    3. Mixed hyperlipidemia    4. Abnormal finding of blood chemistry, unspecified    5. Insomnia, unspecified type    6. Primary osteoarthritis of both knees    7. Benign prostatic hyperplasia, unspecified whether lower urinary tract symptoms present       274}    Assessment & Plan  1. Hypertension.  His hypertension is well-managed with the current medication regimen. He will continue with the existing medications.    2. Hyperlipidemia.  His hyperlipidemia is stable under the current treatment plan. He will maintain the current medications.    3. Osteoarthritis of the knees.  His osteoarthritis is poorly controlled, necessitating further intervention. A referral to Dr. Hanna or Dr. Nunez for orthopedic consultation was discussed, but he will think about it and coordinate with his family. He is currently using Norco for breakthrough pain. He will continue with the current medications.    4. Benign prostatic hyperplasia (BPH).  His BPH requires additional management. Kegel exercises were discussed as a potential non-pharmacological intervention. A trial of Cialis, to be taken once daily, will be initiated in conjunction with his existing medications. The prescription for Cialis will be sent to the [facility] pharmacy for prior authorization. His response to this treatment will be closely monitored.    5. Medication management.  A refill for Friedens will be sent to the [facility] pharmacy.    6. Health maintenance.  He will receive an influenza vaccine today. Fasting labs have been ordered.         This note was generated with the assistance of ambient listening technology. Verbal consent was obtained by the patient and accompanying visitor(s) for the recording of patient appointment to facilitate this note. I attest to having reviewed and edited the generated note for accuracy, though some syntax or spelling errors may persist. Please contact the author of this note  for any clarification.      1. Healthcare maintenance    2. Primary hypertension    3. Mixed hyperlipidemia    4. Abnormal finding of blood chemistry, unspecified    5. Insomnia, unspecified type    6. Primary osteoarthritis of both knees  - HYDROcodone-acetaminophen (NORCO) 7.5-325 mg per tablet; 1 tablet PO TID prn severe pain  Dispense: 90 tablet; Refill: 0    7. Benign prostatic hyperplasia, unspecified whether lower urinary tract symptoms present  - tadalafiL (CIALIS) 5 MG tablet; Take 1 tablet (5 mg total) by mouth once daily. for 21 days  Dispense: 21 tablet; Refill: 0      Bart Burrows MD   274}    If you are due for any health screening(s) below please notify me so we can arrange them to be ordered and scheduled. Most healthy patients at your age complete them, but you are free to accept or refuse.     If you can't do it, I'll definitely understand. If you can, I'd certainly appreciate it!   All of your core healthy metrics are met.

## 2025-01-19 ENCOUNTER — PATIENT MESSAGE (OUTPATIENT)
Dept: FAMILY MEDICINE | Facility: CLINIC | Age: 85
End: 2025-01-19
Payer: MEDICARE

## 2025-01-19 DIAGNOSIS — R94.5 ABNORMAL RESULTS OF LIVER FUNCTION STUDIES: Primary | ICD-10-CM

## 2025-01-19 DIAGNOSIS — R79.9 ABNORMAL FINDING OF BLOOD CHEMISTRY, UNSPECIFIED: ICD-10-CM

## 2025-01-19 NOTE — PROGRESS NOTES
Please let pt know he had very high elevated liver enzymes test but need to repeat to ensure this is accurate and added additional test please obtain at earliest time possible

## 2025-01-21 ENCOUNTER — E-CONSULT (OUTPATIENT)
Dept: PHARMACY | Facility: CLINIC | Age: 85
End: 2025-01-21
Payer: MEDICARE

## 2025-01-21 DIAGNOSIS — R79.89 ELEVATED LFTS: Primary | ICD-10-CM

## 2025-01-21 NOTE — CONSULTS
[facility] - Pharmacy/Wellness  Response for E-Consult     Patient Name: Tha Guzman  MRN: [id number]  Primary Care Provider: Bart Burrows MD   Requesting Provider: Bart uBrrows MD  E-Consult to Pharmacy  Consult performed by: Austin Moon PharmD  Consult ordered by: Bart Burrows MD  Reason for consult: Pt blood work showed very high lft elevation no symptoms could any medication that is not new cause increase in liver enzymes randomly.  Assessment/Recommendations: It is not likely that the current meds can cause the elevated LFTs.  After caluclating the R value/R Factor, Elevated LFTs follow a hepatocellular pattern.  It would be advisable to test for HepB as well.    Per UptoDate,   Some hepatocellular conditions   Viral hepatitis  Hepatitis A, B, C, D, and E  Atif-Barr virus  Cytomegalovirus  Alcohol  Drugs  Predictable, dose-dependent (eg, acetaminophen)  Unpredictable, idiosyncratic (many drugs)  Environmental toxins  Autoimmune hepatitis  Estevan disease  Ischemia (eg, Budd-Chiari syndrome, ischemic hepatitis)  Congestive hepatopathy (eg, from right-sided heart failure)              Recommendation: Test for Hepatitis    Contingency that warrants a repeat eConsult or referral: NO    Total time of Consultation: 15 minute    I did not speak to the requesting provider verbally about this.     *This eConsult is based on the clinical data available to me and is furnished without benefit of a physical examination. The eConsult will need to be interpreted in light of any clinical issues or changes in patient status not available to me at the time of filing this eConsults. Significant changes in patient condition or level of acuity should result in immediate formal consultation and reevaluation. Please alert me if you have further questions.    Thank you for this eConsult referral.     Austin Moon PharmD  [facility] - Pharmacy/Wellness

## 2025-01-29 ENCOUNTER — LAB VISIT (OUTPATIENT)
Dept: LAB | Facility: HOSPITAL | Age: 85
End: 2025-01-29
Attending: STUDENT IN AN ORGANIZED HEALTH CARE EDUCATION/TRAINING PROGRAM
Payer: MEDICARE

## 2025-01-29 DIAGNOSIS — R79.9 ABNORMAL FINDING OF BLOOD CHEMISTRY, UNSPECIFIED: ICD-10-CM

## 2025-01-29 DIAGNOSIS — R94.5 ABNORMAL RESULTS OF LIVER FUNCTION STUDIES: ICD-10-CM

## 2025-01-29 LAB
ALBUMIN SERPL BCP-MCNC: 4 G/DL (ref 3.5–5.2)
ALP SERPL-CCNC: 77 U/L (ref 40–150)
ALT SERPL W/O P-5'-P-CCNC: 53 U/L (ref 10–44)
AST SERPL-CCNC: 33 U/L (ref 10–40)
BILIRUB DIRECT SERPL-MCNC: 0.3 MG/DL (ref 0.1–0.3)
BILIRUB SERPL-MCNC: 0.7 MG/DL (ref 0.1–1)
IGG SERPL-MCNC: 1238 MG/DL (ref 650–1600)
INR PPP: 1 (ref 0.8–1.2)
PROT SERPL-MCNC: 7.8 G/DL (ref 6–8.4)
PROTHROMBIN TIME: 10.9 SEC (ref 9–12.5)

## 2025-01-29 PROCEDURE — 86235 NUCLEAR ANTIGEN ANTIBODY: CPT | Mod: 59 | Performed by: STUDENT IN AN ORGANIZED HEALTH CARE EDUCATION/TRAINING PROGRAM

## 2025-01-29 PROCEDURE — 85610 PROTHROMBIN TIME: CPT | Performed by: STUDENT IN AN ORGANIZED HEALTH CARE EDUCATION/TRAINING PROGRAM

## 2025-01-29 PROCEDURE — 86038 ANTINUCLEAR ANTIBODIES: CPT | Performed by: STUDENT IN AN ORGANIZED HEALTH CARE EDUCATION/TRAINING PROGRAM

## 2025-01-29 PROCEDURE — 80074 ACUTE HEPATITIS PANEL: CPT | Performed by: STUDENT IN AN ORGANIZED HEALTH CARE EDUCATION/TRAINING PROGRAM

## 2025-01-29 PROCEDURE — 36415 COLL VENOUS BLD VENIPUNCTURE: CPT | Mod: PO | Performed by: STUDENT IN AN ORGANIZED HEALTH CARE EDUCATION/TRAINING PROGRAM

## 2025-01-29 PROCEDURE — 86015 ACTIN ANTIBODY EACH: CPT | Performed by: STUDENT IN AN ORGANIZED HEALTH CARE EDUCATION/TRAINING PROGRAM

## 2025-01-29 PROCEDURE — 86039 ANTINUCLEAR ANTIBODIES (ANA): CPT | Performed by: STUDENT IN AN ORGANIZED HEALTH CARE EDUCATION/TRAINING PROGRAM

## 2025-01-29 PROCEDURE — 80076 HEPATIC FUNCTION PANEL: CPT | Performed by: STUDENT IN AN ORGANIZED HEALTH CARE EDUCATION/TRAINING PROGRAM

## 2025-01-29 PROCEDURE — 86381 MITOCHONDRIAL ANTIBODY EACH: CPT | Performed by: STUDENT IN AN ORGANIZED HEALTH CARE EDUCATION/TRAINING PROGRAM

## 2025-01-29 PROCEDURE — 86376 MICROSOMAL ANTIBODY EACH: CPT | Performed by: STUDENT IN AN ORGANIZED HEALTH CARE EDUCATION/TRAINING PROGRAM

## 2025-01-29 PROCEDURE — 82784 ASSAY IGA/IGD/IGG/IGM EACH: CPT | Performed by: STUDENT IN AN ORGANIZED HEALTH CARE EDUCATION/TRAINING PROGRAM

## 2025-01-30 LAB
ANA PATTERN 1: NORMAL
ANA SER QL IF: POSITIVE
ANA TITR SER IF: NORMAL {TITER}
HAV IGM SERPL QL IA: NORMAL
HBV CORE IGM SERPL QL IA: NORMAL
HBV SURFACE AG SERPL QL IA: NORMAL
HCV AB SERPL QL IA: NORMAL
MITOCHONDRIA AB TITR SER IF: NORMAL {TITER}
SMOOTH MUSCLE AB TITR SER IF: NORMAL {TITER}

## 2025-01-31 LAB
ANTI SM ANTIBODY: 0.14 RATIO (ref 0–0.99)
ANTI SM/RNP ANTIBODY: 0.16 RATIO (ref 0–0.99)
ANTI-SM INTERPRETATION: NEGATIVE
ANTI-SM/RNP INTERPRETATION: NEGATIVE
ANTI-SSA ANTIBODY: 0.11 RATIO (ref 0–0.99)
ANTI-SSA INTERPRETATION: NEGATIVE
ANTI-SSB ANTIBODY: 0.11 RATIO (ref 0–0.99)
ANTI-SSB INTERPRETATION: NEGATIVE
DSDNA AB SER-ACNC: NORMAL [IU]/ML

## 2025-02-04 LAB — LKM AB SER-ACNC: 0.9 UNITS

## 2025-02-13 DIAGNOSIS — N40.0 BENIGN PROSTATIC HYPERPLASIA, UNSPECIFIED WHETHER LOWER URINARY TRACT SYMPTOMS PRESENT: ICD-10-CM

## 2025-02-13 NOTE — TELEPHONE ENCOUNTER
No care due was identified.  [facility] Embedded Care Due Messages. Reference number: [id number].   2/13/2025 2:28:29 PM CST

## 2025-02-14 RX ORDER — TADALAFIL 5 MG/1
5 TABLET ORAL DAILY
Qty: 21 TABLET | Refills: 2 | Status: SHIPPED | OUTPATIENT
Start: 2025-02-14 | End: 2025-03-07

## 2025-02-14 NOTE — TELEPHONE ENCOUNTER
Refill Routing Note   Medication(s) are not appropriate for processing by [facility] for the following reason(s):        New or recently adjusted medication    [facility] action(s):  Defer             Appointments  past 12m or future 3m with PCP    Date Provider   Last Visit   1/17/2025 Bart Burrows MD   Next Visit   7/25/2025 Bart Burrows MD   ED visits in past 90 days: 0        Note composed:7:28 PM 02/13/2025

## 2025-04-11 DIAGNOSIS — M25.569 KNEE PAIN, UNSPECIFIED CHRONICITY, UNSPECIFIED LATERALITY: Primary | ICD-10-CM

## 2025-04-24 ENCOUNTER — TELEPHONE (OUTPATIENT)
Dept: FAMILY MEDICINE | Facility: CLINIC | Age: 85
End: 2025-04-24
Payer: MEDICARE

## 2025-04-24 ENCOUNTER — OFFICE VISIT (OUTPATIENT)
Dept: ORTHOPEDICS | Facility: CLINIC | Age: 85
End: 2025-04-24
Payer: MEDICARE

## 2025-04-24 ENCOUNTER — HOSPITAL ENCOUNTER (OUTPATIENT)
Dept: RADIOLOGY | Facility: HOSPITAL | Age: 85
Discharge: HOME | End: 2025-04-24
Attending: ORTHOPAEDIC SURGERY
Payer: MEDICARE

## 2025-04-24 VITALS — HEIGHT: 63 IN | RESPIRATION RATE: 18 BRPM | BODY MASS INDEX: 26.75 KG/M2 | WEIGHT: 151 LBS

## 2025-04-24 DIAGNOSIS — M25.569 KNEE PAIN, UNSPECIFIED CHRONICITY, UNSPECIFIED LATERALITY: Primary | ICD-10-CM

## 2025-04-24 DIAGNOSIS — M17.10 ARTHRITIS OF KNEE: ICD-10-CM

## 2025-04-24 DIAGNOSIS — M25.569 KNEE PAIN, UNSPECIFIED CHRONICITY, UNSPECIFIED LATERALITY: ICD-10-CM

## 2025-04-24 PROCEDURE — 1159F MED LIST DOCD IN RCRD: CPT | Mod: CPTII,S$GLB,, | Performed by: ORTHOPAEDIC SURGERY

## 2025-04-24 PROCEDURE — 99204 OFFICE O/P NEW MOD 45 MIN: CPT | Mod: 57,S$GLB,, | Performed by: ORTHOPAEDIC SURGERY

## 2025-04-24 PROCEDURE — 73564 X-RAY EXAM KNEE 4 OR MORE: CPT | Mod: 26,50,, | Performed by: RADIOLOGY

## 2025-04-24 PROCEDURE — 73564 X-RAY EXAM KNEE 4 OR MORE: CPT | Mod: TC,50,PO

## 2025-04-24 PROCEDURE — 1101F PT FALLS ASSESS-DOCD LE1/YR: CPT | Mod: CPTII,S$GLB,, | Performed by: ORTHOPAEDIC SURGERY

## 2025-04-24 PROCEDURE — 3288F FALL RISK ASSESSMENT DOCD: CPT | Mod: CPTII,S$GLB,, | Performed by: ORTHOPAEDIC SURGERY

## 2025-04-24 PROCEDURE — 1125F AMNT PAIN NOTED PAIN PRSNT: CPT | Mod: CPTII,S$GLB,, | Performed by: ORTHOPAEDIC SURGERY

## 2025-04-24 PROCEDURE — 99999 PR PBB SHADOW E&M-EST. PATIENT-LVL III: CPT | Mod: PBBFAC,,, | Performed by: ORTHOPAEDIC SURGERY

## 2025-04-24 NOTE — PROGRESS NOTES
Past Medical History:   Diagnosis Date    Arthritis     Elevated PSA     Essential hypertension, benign     Mixed hyperlipidemia     Prostatitis, unspecified     Wears glasses        Past Surgical History:   Procedure Laterality Date    cataracts      COLONOSCOPY N/A 8/14/2019    Procedure: COLONOSCOPY;  Surgeon: Dominick Sr MD;  Location: [facility];  Service: Endoscopy;  Laterality: N/A;    COLONOSCOPY N/A 8/2/2023    Procedure: COLONOSCOPY;  Surgeon: Dominick Sr MD;  Location: [facility];  Service: Endoscopy;  Laterality: N/A;    HEMORRHOID SURGERY      KNEE ARTHROSCOPY W/ MENISCAL REPAIR Left     TONSILLECTOMY         Current Outpatient Medications   Medication Sig    alfuzosin (UROXATRAL) 10 mg Tb24 TAKE 1 TABLET (10 MG TOTAL) BY MOUTH DAILY WITH BREAKFAST.    amlodipine-benazepril 5-20 mg (LOTREL) 5-20 mg per capsule TAKE 1 CAPSULE BY MOUTH EVERY DAY    aspirin (ECOTRIN) 81 MG EC tablet Take 81 mg by mouth once daily.    HYDROcodone-acetaminophen (NORCO) 7.5-325 mg per tablet 1 tablet PO TID prn severe pain    simvastatin (ZOCOR) 20 MG tablet TAKE 1 TABLET BY MOUTH EVERY DAY IN THE EVENING    tadalafiL (CIALIS) 5 MG tablet Take 1 tablet (5 mg total) by mouth once daily. for 21 days    traZODone (DESYREL) 100 MG tablet TAKE 1 TABLET BY MOUTH NIGHTLY AS NEEDED FOR INSOMNIA.     No current facility-administered medications for this visit.       Review of patient's allergies indicates:   Allergen Reactions    Aminoglycosides Other (See Comments)     Lips swelled    Meperidine Itching    Penicillins Itching       Family History   Problem Relation Name Age of Onset    Cancer Brother          lunga cancer    Cancer Paternal Uncle          prostate cancer    Melanoma Neg Hx      Psoriasis Neg Hx      Lupus Neg Hx      Eczema Neg Hx         Social History[1]    Chief Complaint: No chief complaint on file.      History of present illness:  85-year-old male seen for worsening bilateral knee pain.  Left greater  than right.  Patient has had pain for years that is just progressing.  Starting to affect his quality of life.  He is not able to do yard work or even walk for any significant distance without significant pain.  He has tried cortisone injections previously.  Rates the pain currently as a 7/10.    Prior treatment:  Cortisone injections and NSAIDs        Review of Systems:    Constitution: Negative for chills, fever, and sweats.  Negative for unexplained weight loss.    HENT:  Negative for headaches and blurry vision.    Cardiovascular:Negative for chest pain or irregular heart beat. Negative for hypertension.    Respiratory:  Negative for cough and shortness of breath.    Gastrointestinal: Negative for abdominal pain, heartburn, melena, nausea, and vomitting.    Genitourinary:  Negative bladder incontinence and dysuria.    Musculoskeletal:  See HPI    Neurological: Negative for numbness.    Psychiatric/Behavioral: Negative for depression.  The patient is not nervous/anxious.      Endocrine: Negative for polyuria    Hematologic/Lymphatic: Negative for bleeding problem.  Does not bruise/bleed easily.    Skin: Negative for poor would healing and rash      Physical Examination:    Vital Signs:  There were no vitals filed for this visit.    There is no height or weight on file to calculate BMI.    This a well-developed, well nourished patient in no acute distress.  They are alert and oriented and cooperative to examination.  Pt. walks without an antalgic gait.      Examination of the left knee shows no rashes or erythema. There are no masses ecchymosis or effusion. Patient has full range of motion from 5-130°. Patient is nontender to palpation over lateral joint line and moderately tender to palpation over the medial joint line. Patient has a - Lachman exam, - anterior drawer exam, and - posterior drawer exam. - Apley exam. Knee is stable to varus and valgus stress. 5 out of 5 motor strength. Palpable distal pulses.  Intact light touch sensation. Negative Patellofemoral crepitus      Heart is regular rate without obvious murmurs   Normal respiratory effort without audible wheezing  Abdomen is soft and nontender     X-rays:  X-rays of both knees are ordered and review which show severe medial compartment narrowing with varus knee arthritis         Assessment:  Severe bilateral varus knee osteoarthritis    Plan:  I reviewed the findings with her today.  We talked in great detail about total knee arthroplasty.  Patient would like to go ahead and schedule that.  We will get him cleared by his primary care provider.  Plan is for left robotic assisted total knee arthroplasty with Maksim triathlon implants.  Risks, benefits, and alternatives to the procedure were explained to the patient including but not limited to damage to nerves, arteries, blood vessels, bones, tendons, ligaments, stiffness, instability, infection, permanent limb dysfunction, DVT, PE, as well as general anesthetic complications including seizure, stroke, heart attack and even death. The patient understood these risks and wished to proceed and signed the informed consent.       The mobility limitation can not be sufficiently resolved by the use of a cane.  Patient's functional mobility deficit can be sufficiently resolved with the use of a rolling walker or a walker.  Patient has mobility limitation significantly impairs their ability to participate in 1 or more activities of daily living.  The use of a walker or rolling walker we will significantly improve the patient's ability to participate in activities of daily living and the patient will use it on a regular basis in the home.              All previous pertinent notes including ER visits, physical therapy visits, other orthopedic visits as well as other care for the same musculoskeletal problem were reviewed.  All pertinent lab values and previous imaging was reviewed pertinent to the current visit.    This  note was created using [employer] voice recognition software that occasionally misinterpreted phrases or words.    Consult note is delivered via Epic messaging service.           [1]   Social History  Socioeconomic History    Marital status:    Tobacco Use    Smoking status: Former     Current packs/day: 0.00     Average packs/day: 1 pack/day for 40.0 years (40.0 ttl pk-yrs)     Types: Cigarettes     Start date: 1967     Quit date: 2007     Years since quittin.7     Passive exposure: Past    Smokeless tobacco: Never   Substance and Sexual Activity    Alcohol use: Yes     Alcohol/week: 5.0 standard drinks of alcohol     Types: 6 Standard drinks or equivalent per week     Comment: OCCASSIONALLY    Drug use: No    Sexual activity: Not Currently     Social Drivers of Health     Financial Resource Strain: Low Risk  (2023)    Overall Financial Resource Strain (CARDIA)     Difficulty of Paying Living Expenses: Not hard at all   Recent Concern: Financial Resource Strain - Medium Risk (10/6/2023)    Overall Financial Resource Strain (CARDIA)     Difficulty of Paying Living Expenses: Somewhat hard   Food Insecurity: No Food Insecurity (2023)    Hunger Vital Sign     Worried About Running Out of Food in the Last Year: Never true     Ran Out of Food in the Last Year: Never true   Transportation Needs: No Transportation Needs (2023)    PRAPARE - Transportation     Lack of Transportation (Medical): No     Lack of Transportation (Non-Medical): No   Physical Activity: Insufficiently Active (2023)    Exercise Vital Sign     Days of Exercise per Week: 1 day     Minutes of Exercise per Session: 20 min   Stress: No Stress Concern Present (2023)    [employer] [place] [employer] - Occupational Stress Questionnaire     Feeling of Stress : Only a little   Housing Stability: Low Risk  (2023)    Housing Stability Vital Sign     Unable to Pay for Housing in the Last Year: No      Number of Places Lived in the Last Year: 1     Unstable Housing in the Last Year: No

## 2025-04-24 NOTE — TELEPHONE ENCOUNTER
----- Message from Nurse Coco sent at 4/24/2025  3:29 PM CDT -----  Regarding: pre op clearance  Patient was seen today by Dr. Cardoza and consented for total knee arthroplasty. Please advise on pre op medical clearance. Once clearance has been received we will contact the patient and schedule the procedure.

## 2025-04-28 ENCOUNTER — PATIENT OUTREACH (OUTPATIENT)
Dept: ADMINISTRATIVE | Facility: HOSPITAL | Age: 85
End: 2025-04-28
Payer: MEDICARE

## 2025-05-02 ENCOUNTER — HOSPITAL ENCOUNTER (OUTPATIENT)
Dept: RADIOLOGY | Facility: CLINIC | Age: 85
Discharge: HOME | End: 2025-05-02
Attending: PHYSICIAN ASSISTANT
Payer: MEDICARE

## 2025-05-02 ENCOUNTER — OFFICE VISIT (OUTPATIENT)
Dept: FAMILY MEDICINE | Facility: CLINIC | Age: 85
End: 2025-05-02
Payer: MEDICARE

## 2025-05-02 ENCOUNTER — RESULTS FOLLOW-UP (OUTPATIENT)
Dept: FAMILY MEDICINE | Facility: CLINIC | Age: 85
End: 2025-05-02

## 2025-05-02 VITALS
SYSTOLIC BLOOD PRESSURE: 128 MMHG | BODY MASS INDEX: 26.68 KG/M2 | OXYGEN SATURATION: 97 % | TEMPERATURE: 98 F | RESPIRATION RATE: 17 BRPM | DIASTOLIC BLOOD PRESSURE: 74 MMHG | WEIGHT: 150.56 LBS | HEIGHT: 63 IN | HEART RATE: 88 BPM

## 2025-05-02 DIAGNOSIS — Z01.811 PRE-OP CHEST EXAM: ICD-10-CM

## 2025-05-02 DIAGNOSIS — M17.12 PRIMARY OSTEOARTHRITIS OF LEFT KNEE: ICD-10-CM

## 2025-05-02 DIAGNOSIS — R94.31 ABNORMAL EKG: ICD-10-CM

## 2025-05-02 DIAGNOSIS — E78.2 MIXED HYPERLIPIDEMIA: ICD-10-CM

## 2025-05-02 DIAGNOSIS — R94.5 ABNORMAL RESULTS OF LIVER FUNCTION STUDIES: ICD-10-CM

## 2025-05-02 DIAGNOSIS — I10 PRIMARY HYPERTENSION: ICD-10-CM

## 2025-05-02 DIAGNOSIS — Z01.818 PRE-OP EVALUATION: Primary | ICD-10-CM

## 2025-05-02 DIAGNOSIS — I70.0 ATHEROSCLEROSIS OF AORTA: ICD-10-CM

## 2025-05-02 PROCEDURE — G2211 COMPLEX E/M VISIT ADD ON: HCPCS | Mod: S$GLB,,, | Performed by: PHYSICIAN ASSISTANT

## 2025-05-02 PROCEDURE — 71045 X-RAY EXAM CHEST 1 VIEW: CPT | Mod: TC,FY,PO

## 2025-05-02 PROCEDURE — 3074F SYST BP LT 130 MM HG: CPT | Mod: CPTII,S$GLB,, | Performed by: PHYSICIAN ASSISTANT

## 2025-05-02 PROCEDURE — 1159F MED LIST DOCD IN RCRD: CPT | Mod: CPTII,S$GLB,, | Performed by: PHYSICIAN ASSISTANT

## 2025-05-02 PROCEDURE — 1125F AMNT PAIN NOTED PAIN PRSNT: CPT | Mod: CPTII,S$GLB,, | Performed by: PHYSICIAN ASSISTANT

## 2025-05-02 PROCEDURE — 1101F PT FALLS ASSESS-DOCD LE1/YR: CPT | Mod: CPTII,S$GLB,, | Performed by: PHYSICIAN ASSISTANT

## 2025-05-02 PROCEDURE — 99214 OFFICE O/P EST MOD 30 MIN: CPT | Mod: S$GLB,,, | Performed by: PHYSICIAN ASSISTANT

## 2025-05-02 PROCEDURE — 71045 X-RAY EXAM CHEST 1 VIEW: CPT | Mod: 26,,, | Performed by: RADIOLOGY

## 2025-05-02 PROCEDURE — 99999 PR PBB SHADOW E&M-EST. PATIENT-LVL IV: CPT | Mod: PBBFAC,,, | Performed by: PHYSICIAN ASSISTANT

## 2025-05-02 PROCEDURE — 93005 ELECTROCARDIOGRAM TRACING: CPT | Mod: S$GLB,,, | Performed by: PHYSICIAN ASSISTANT

## 2025-05-02 PROCEDURE — 1160F RVW MEDS BY RX/DR IN RCRD: CPT | Mod: CPTII,S$GLB,, | Performed by: PHYSICIAN ASSISTANT

## 2025-05-02 PROCEDURE — 3288F FALL RISK ASSESSMENT DOCD: CPT | Mod: CPTII,S$GLB,, | Performed by: PHYSICIAN ASSISTANT

## 2025-05-02 PROCEDURE — 3078F DIAST BP <80 MM HG: CPT | Mod: CPTII,S$GLB,, | Performed by: PHYSICIAN ASSISTANT

## 2025-05-02 PROCEDURE — 93010 ELECTROCARDIOGRAM REPORT: CPT | Mod: S$GLB,,, | Performed by: INTERNAL MEDICINE

## 2025-05-02 NOTE — PROGRESS NOTES
Patient ID: Tha Guzman is a 85 y.o. male.    Chief Complaint: Pre-op Exam (Left knee total )      History of Present Illness    CHIEF COMPLAINT:  Mr. Guzman presents for a pre-operative evaluation for upcoming left knee surgery.    HPI:  Mr. Guzman is scheduled for left knee surgery with Dr. Cardoza, expected to take place in the next month, around mid to late May. He reports constant pain in his knees, particularly when standing or engaging in activity, describing the sensation as bone-on-bone friction. He feels comfortable when sitting. He had arthroscopic surgery on the same (left) knee 30 years ago due to a free ligament. He denies any weight-bearing issues. He also reports persistent fatigue, which he attributes to possibly his age or being deconditioned, but states he is still functional to a degree.    He denies any recent signs of infection or illness, chest pain, shortness of breath, or recent worsening fatigue beyond his usual tiredness. He denies any known sleep apnea, loud snoring, or cessation of breathing while sleeping. He also denies any problems with anesthesia in the past.    MEDICATIONS:  Mr. Guzman is on blood pressure medicine, cholesterol medication, and a sleep aid. He is also taking Alfuzosin daily for prostate and baby aspirin daily.    MEDICAL HISTORY:  Mr. Guzman has a history of hypertension, hypercholesterolemia, gout, and a prostate condition.    SURGICAL HISTORY:  Mr. Guzman underwent a left knee arthroscopy 30 years ago for a free ligament. He has an upcoming left knee surgery planned for mid to late May, pending cardiac clearance.    TEST RESULTS:  Mr. Cerratos liver enzymes were checked in January to monitor liver function.    SOCIAL HISTORY:  Smoking: Quit a while ago           HPI    Past Medical History:   Diagnosis Date    Arthritis     Elevated PSA     Essential hypertension, benign     Mixed hyperlipidemia     Prostatitis, unspecified     Wears glasses                Current Medications[1]    The ASCVD Risk score (Abiodun WATSON, et al., 2019) failed to calculate for the following reasons:    The 2019 ASCVD risk score is only valid for ages 40 to 79     Wt Readings from Last 3 Encounters:   05/02/25 68.3 kg (150 lb 9.2 oz)   04/24/25 68.5 kg (151 lb 0.2 oz)   01/17/25 68.5 kg (151 lb 0.2 oz)     Temp Readings from Last 3 Encounters:   05/02/25 98.3 °F (36.8 °C) (Oral)   01/17/25 97.7 °F (36.5 °C) (Oral)   06/19/24 98.5 °F (36.9 °C) (Oral)     BP Readings from Last 3 Encounters:   05/02/25 128/74   01/17/25 130/82   06/19/24 114/72     Pulse Readings from Last 3 Encounters:   05/02/25 88   01/17/25 89   06/19/24 93     Resp Readings from Last 3 Encounters:   05/02/25 17   04/24/25 18   01/17/25 12     PF Readings from Last 3 Encounters:   No data found for PF     SpO2 Readings from Last 3 Encounters:   05/02/25 97%   01/17/25 96%   06/19/24 96%        Lab Results   Component Value Date    HGBA1C 4.8 01/17/2025    HGBA1C 5.2 06/19/2024    HGBA1C 5.0 05/09/2023     Lab Results   Component Value Date    LDLCALC 62.2 (L) 01/17/2025    CREATININE 0.9 01/17/2025       Review of Systems   Constitutional:  Negative for activity change, appetite change and fever.   HENT:  Negative for postnasal drip, rhinorrhea and sinus pressure.    Eyes:  Negative for visual disturbance.   Respiratory:  Negative for cough and shortness of breath.    Cardiovascular:  Negative for chest pain.   Gastrointestinal:  Negative for abdominal distention and abdominal pain.   Genitourinary:  Negative for difficulty urinating and dysuria.   Musculoskeletal:  Positive for arthralgias. Negative for myalgias.   Neurological:  Negative for headaches.   Hematological:  Negative for adenopathy.   Psychiatric/Behavioral:  The patient is not nervous/anxious.            Objective:   Physical Exam                 Physical Exam  Constitutional:       Appearance: Normal appearance.   HENT:      Head: Normocephalic  and atraumatic.   Eyes:      Conjunctiva/sclera: Conjunctivae normal.   Cardiovascular:      Rate and Rhythm: Normal rate and regular rhythm.   Pulmonary:      Effort: Pulmonary effort is normal. No respiratory distress.      Breath sounds: Normal breath sounds. No wheezing.   Abdominal:      General: There is no distension.      Palpations: There is no mass.      Tenderness: There is no abdominal tenderness.   Musculoskeletal:      Right lower leg: No edema.      Left lower leg: No edema.      Comments: Ambulating with cane   Lymphadenopathy:      Cervical: No cervical adenopathy.   Skin:     Findings: No erythema.   Neurological:      Mental Status: He is alert and oriented to person, place, and time.   Psychiatric:         Behavior: Behavior normal.             Screening recommendations appropriate to age and health status were reviewed.    Pre-op evaluation  -     Protime-INR; Future; Expected date: 05/02/2025  -     Stress Echo Which stress agent will be used? Pharmacological; Color Flow Doppler? No; Future    Pre-op chest exam  -     CBC Auto Differential; Future; Expected date: 05/02/2025  -     Comprehensive Metabolic Panel; Future; Expected date: 05/02/2025  -     Cancel: X-Ray Chest PA And Lateral; Future; Expected date: 05/02/2025  -     EKG 12-lead  -     X-Ray Chest 1 View Pre-OP; Future; Expected date: 05/02/2025    Primary osteoarthritis of left knee  -     Protime-INR; Future; Expected date: 05/02/2025    Mixed hyperlipidemia    Primary hypertension  -     Protime-INR; Future; Expected date: 05/02/2025    Atherosclerosis of aorta    Abnormal results of liver function studies  -     Protime-INR; Future; Expected date: 05/02/2025    Abnormal EKG  -     Stress Echo Which stress agent will be used? Pharmacological; Color Flow Doppler? No; Future      IMPRESSION:  - Evaluated for pre-operative clearance for left knee surgery with Dr. Cardoza.  - Assessed cardiac risk factors and need for additional  testing prior to surgery.  - Considered lack of home support for post-op care and potential need for rehabilitation facility stay.  - Reviewing recent lab work and determining need for updated tests within 30 days of surgery.  - Planning to review EKG results to determine if further cardiac workup is necessary.         RCRI risk factors include: no known RCRI risk factors. As such, per RCRI the risk of cardiac death, nonfatal myocardial infarction, or nonfatal cardiac arrest is 0.4% and the risk of myocardial infarction, pulmonary edema, ventricular fibrillation, primary cardiac arrest, or complete heart block is 0.5%.  Overall this patient can be considered intermediate risk for this intermediate risk procedure. No further cardiac testing is recommended at this time.     Patient denies any symptoms (as per HPI) concerning for undiagnosed lung disease including ANGY. Would not recommend obtaining chest X-ray, sleep study, or PFTs at this time. Patient quit smoking many years ago. We discussed the benefits of early mobilization and deep breathing after surgery.      Screened patient for alcohol misuse, use of illicit drugs, and personal or family history of anesthetic complications or bleeding diathesis and no substantial concerns were identified.     All current medications were reviewed and at this time no changes to medications are recommended prior to surgery.           This note was generated with the assistance of ambient listening technology. Verbal consent was obtained by the patient and accompanying visitor(s) for the recording of patient appointment to facilitate this note. I attest to having reviewed and edited the generated note for accuracy, though some syntax or spelling errors may persist. Please contact the author of this note for any clarification.           [1]   Current Outpatient Medications:     alfuzosin (UROXATRAL) 10 mg Tb24, TAKE 1 TABLET (10 MG TOTAL) BY MOUTH DAILY WITH BREAKFAST., Disp: 90  tablet, Rfl: 3    amlodipine-benazepril 5-20 mg (LOTREL) 5-20 mg per capsule, TAKE 1 CAPSULE BY MOUTH EVERY DAY, Disp: 90 capsule, Rfl: 3    aspirin (ECOTRIN) 81 MG EC tablet, Take 81 mg by mouth once daily., Disp: , Rfl:     HYDROcodone-acetaminophen (NORCO) 7.5-325 mg per tablet, 1 tablet PO TID prn severe pain, Disp: 90 tablet, Rfl: 0    simvastatin (ZOCOR) 20 MG tablet, TAKE 1 TABLET BY MOUTH EVERY DAY IN THE EVENING, Disp: 90 tablet, Rfl: 3    traZODone (DESYREL) 100 MG tablet, TAKE 1 TABLET BY MOUTH NIGHTLY AS NEEDED FOR INSOMNIA., Disp: 90 tablet, Rfl: 2    tadalafiL (CIALIS) 5 MG tablet, Take 1 tablet (5 mg total) by mouth once daily. for 21 days, Disp: 21 tablet, Rfl: 2

## 2025-05-06 ENCOUNTER — TELEPHONE (OUTPATIENT)
Dept: CARDIOLOGY | Facility: HOSPITAL | Age: 85
End: 2025-05-06

## 2025-05-06 LAB
OHS QRS DURATION: 90 MS
OHS QTC CALCULATION: 439 MS

## 2025-05-07 ENCOUNTER — HOSPITAL ENCOUNTER (OUTPATIENT)
Dept: PREADMISSION TESTING | Facility: HOSPITAL | Age: 85
Discharge: HOME | End: 2025-05-07
Attending: PHYSICIAN ASSISTANT
Payer: MEDICARE

## 2025-05-07 ENCOUNTER — CLINICAL SUPPORT (OUTPATIENT)
Dept: CARDIOLOGY | Facility: HOSPITAL | Age: 85
End: 2025-05-07
Attending: PHYSICIAN ASSISTANT
Payer: MEDICARE

## 2025-05-07 DIAGNOSIS — R94.31 ABNORMAL EKG: ICD-10-CM

## 2025-05-07 DIAGNOSIS — Z01.818 PRE-OP EVALUATION: ICD-10-CM

## 2025-05-07 LAB
CV STRESS BASE HR: 76 BPM
DIASTOLIC BLOOD PRESSURE: 79 MMHG
OHS CV CPX 1 MINUTE RECOVERY HEART RATE: 122 BPM
OHS CV CPX 85 PERCENT MAX PREDICTED HEART RATE MALE: 115
OHS CV CPX MAX PREDICTED HEART RATE: 135
OHS CV CPX PATIENT IS FEMALE: 0
OHS CV CPX PATIENT IS MALE: 1
OHS CV CPX PEAK DIASTOLIC BLOOD PRESSURE: 78 MMHG
OHS CV CPX PEAK HEAR RATE: 132 BPM
OHS CV CPX PEAK RATE PRESSURE PRODUCT: NORMAL
OHS CV CPX PEAK SYSTOLIC BLOOD PRESSURE: 163 MMHG
OHS CV CPX PERCENT MAX PREDICTED HEART RATE ACHIEVED: 98
OHS CV CPX RATE PRESSURE PRODUCT PRESENTING: NORMAL
OHS CV INITIAL DOSE: 20 MCG/KG/MIN
OHS CV PEAK DOSE: 30 MCG/KG/MIN
SYSTOLIC BLOOD PRESSURE: 135 MMHG

## 2025-05-07 PROCEDURE — 93351 STRESS TTE COMPLETE: CPT

## 2025-05-07 PROCEDURE — 63600150 PHARM REV CODE 636: Performed by: PHYSICIAN ASSISTANT

## 2025-05-07 RX ORDER — DOBUTAMINE HYDROCHLORIDE 100 MG/100ML
20 INJECTION INTRAVENOUS CONTINUOUS
Status: DISCONTINUED | OUTPATIENT
Start: 2025-05-07 | End: 2025-05-08 | Stop reason: HOSPADM

## 2025-05-07 RX ADMIN — DOBUTAMINE IN DEXTROSE 20 MCG/KG/MIN: 100 INJECTION, SOLUTION INTRAVENOUS at 07:05

## 2025-05-07 NOTE — NURSING NOTE
Dobutamine Stress Test completed without complications. Patient verbalized understanding of pre-post test instructions. Discharged from lab per Cardiology.

## 2025-05-08 ENCOUNTER — TELEPHONE (OUTPATIENT)
Dept: FAMILY MEDICINE | Facility: CLINIC | Age: 85
End: 2025-05-08
Payer: MEDICARE

## 2025-05-08 NOTE — TELEPHONE ENCOUNTER
----- Message from Tech Pankaj sent at 5/8/2025  4:10 PM CDT -----  Regarding: pre op clearance  Patient has completed stress and EKG.  I see where stress was normal, is he cleared after EKG as well?  Thank you, Asa

## 2025-05-09 LAB
OHS QRS DURATION: 86 MS
OHS QTC CALCULATION: 442 MS

## 2025-05-12 ENCOUNTER — PATIENT MESSAGE (OUTPATIENT)
Dept: ORTHOPEDICS | Facility: CLINIC | Age: 85
End: 2025-05-12
Payer: MEDICARE

## 2025-05-12 DIAGNOSIS — M17.10 ARTHRITIS OF KNEE: Primary | ICD-10-CM

## 2025-05-13 ENCOUNTER — PATIENT MESSAGE (OUTPATIENT)
Dept: ORTHOPEDICS | Facility: CLINIC | Age: 85
End: 2025-05-13
Payer: MEDICARE

## 2025-05-13 DIAGNOSIS — Z01.818 PRE-OP TESTING: ICD-10-CM

## 2025-05-13 DIAGNOSIS — M17.12 PRIMARY OSTEOARTHRITIS OF LEFT KNEE: Primary | ICD-10-CM

## 2025-05-13 RX ORDER — MUPIROCIN 20 MG/G
OINTMENT TOPICAL
OUTPATIENT
Start: 2025-05-13

## 2025-05-30 ENCOUNTER — PATIENT MESSAGE (OUTPATIENT)
Dept: ORTHOPEDICS | Facility: CLINIC | Age: 85
End: 2025-05-30
Payer: MEDICARE

## 2025-06-11 ENCOUNTER — HOSPITAL ENCOUNTER (OUTPATIENT)
Dept: PREADMISSION TESTING | Facility: HOSPITAL | Age: 85
Discharge: HOME | End: 2025-06-11
Attending: ORTHOPAEDIC SURGERY
Payer: MEDICARE

## 2025-06-11 ENCOUNTER — HOSPITAL ENCOUNTER (OUTPATIENT)
Dept: RADIOLOGY | Facility: HOSPITAL | Age: 85
Discharge: HOME | End: 2025-06-11
Attending: ORTHOPAEDIC SURGERY
Payer: MEDICARE

## 2025-06-11 VITALS — BODY MASS INDEX: 26.58 KG/M2 | HEIGHT: 63 IN | WEIGHT: 150 LBS

## 2025-06-11 DIAGNOSIS — Z01.818 PREOP TESTING: Primary | ICD-10-CM

## 2025-06-11 DIAGNOSIS — M17.10 ARTHRITIS OF KNEE: ICD-10-CM

## 2025-06-11 DIAGNOSIS — M17.12 PRIMARY OSTEOARTHRITIS OF LEFT KNEE: Primary | ICD-10-CM

## 2025-06-11 LAB — MRSA PCR SCRN (SMH): NOT DETECTED

## 2025-06-11 PROCEDURE — 87641 MR-STAPH DNA AMP PROBE: CPT | Performed by: ORTHOPAEDIC SURGERY

## 2025-06-11 PROCEDURE — 73700 CT LOWER EXTREMITY W/O DYE: CPT | Mod: TC,LT

## 2025-06-11 NOTE — DISCHARGE INSTRUCTIONS
To confirm, Your doctor has instructed you that surgery is scheduled for: 6/24/25 with DR. GUILLEN    Please report to [facility] beds at the [facility], Registration the morning of surgery. You must check-in and receive a wristband before going to your procedure.  [address] [place] [address] [place] [address]    Pre-Op will call the afternoon prior to surgery between 1:00 and 6:00 PM with the final arrival time.  Phone number: [phone]    PLEASE NOTE:  The surgery schedule has many variables which may affect the time of your surgery case.  Family members should be available if your surgery time changes.  Plan to be here the day of your procedure between 4-6 hours.    MEDICATIONS:  TAKE ONLY THESE MEDICATIONS WITH A SMALL SIP OF WATER THE MORNING OF YOUR PROCEDURE:  SEE MED LIST      DO NOT TAKE THESE MEDICATIONS 5-7 DAYS PRIOR to your procedure or per your surgeon's request:   ASPIRIN, ALEVE, ADVIL, IBUPROFEN, FISH OIL VITAMIN E, HERBALS  (May take Tylenol)    ONLY if you are prescribed any types of blood thinners such as:  Aspirin, Coumadin, Plavix, Pradaxa, Xarelto, Aggrenox, Effient, Eliquis, Savasya, Brilinta, or any other, ask your surgeon whether you should stop taking them and how long before surgery you should stop.  You may also need to verify with the prescribing physician if it is ok to stop your medication.      INSTRUCTIONS IMPORTANT!!  Do not eat or drink anything between midnight and the time of your procedure- this includes gum, mints, and candy.  EXCEPT: you may have clear liquids such as:  WATER, BLACK COFFEE, UNSWEET TEA, OR GATORADE (NO RED OR PURPLE) UP TO 2 HOURS PRIOR TO YOUR ARRIVAL TIME.  Do not smoke or drink alcoholic beverages 24 hours prior to your procedure.  Shower the night before AND the morning of your procedure with a Chlorhexidine wash such as Hibiclens or Dial antibacterial soap from the neck down.  Do not get it on your face or in your eyes.  You may use your own shampoo and face  wash. This helps your skin to be as bacteria free as possible.    If you wear contact lenses, dentures, hearing aids or glasses, bring a container to put them in during surgery and give to a family member for safe keeping.  Please leave all jewelry, piercing's and valuables at home. You must remove your false eyelashes prior to surgery.    DO NOT remove hair from the surgery site.  Do not shave the incision site unless you are given specific instructions to do so.    ONLY if you have been diagnosed with sleep apnea please bring your C-PAP machine.  ONLY if you wear home oxygen please bring your portable oxygen tank the day of your procedure.  ONLY if you have a history of OPEN HEART SURGERY you will need a clearance from your Cardiologist per Anesthesia.      ONLY for patients requiring bowel prep, written instructions will be given by your doctor's office.  ONLY if you have any type of stimulator implant or any type of implanted device with a remote control.  Please bring the controller with you the morning of surgery  If your doctor has scheduled you for an overnight stay, bring a small overnight bag with any personal items you need.  Make arrangements in advance for transportation home by a responsible adult. You can not go home in an uber or a cab per hospital policy.  It is not safe to drive a vehicle during the 24 hours after anesthesia.          All  facilities and properties are tobacco free.  Smoking is NOT allowed.   If you have any questions about these instructions, call Pre-Op Admit  Nursing at [phone] or the Pre-Op Day Surgery Unit at [phone].

## 2025-06-11 NOTE — PRE ADMISSION SCREENING
Patient Name: Tha Guzman  YOB: 1940   MRN: [id number]     [facility]   Basic Mobility Inpatient Short Form 6 Clicks         How much difficulty does the patient currently have  Unable  A Lot  A Little  None      1. Turning over in bed (including adjusting bedclothes, sheets and blankets)?     1 []    2 []    3 []    4 [x]        2. Sitting down on and standing up from a chair with arms (e.g., wheelchair, bedside commode, etc.)     1 []  2 []  3 []     4 [x]      3. Moving from lying on back to sitting on the side of the bed?     1 []  2 []  3 []    4 [x]    How much help from another person does the patient currently need  Total  A Lot  A Little  None      4. Moving to and from a bed to a chair (including a wheelchair)?    1 []  2 []  3 []    4 [x]      5. Need to walk in hospital room?    1 []  2 []  3 []    4 [x]      6. Climbing 3-5 steps with a railing?    1 []  2 []  3 []    4 [x]       Raw Score:      24            CMS 0-100% Score:     0       %   Standardized Score:    61.14           CMS Modifier:        [facility] AM-PAC   Basic Mobility Inpatient Short Form 6 Clicks Score Conversion Table*         *Use this form to convert -PAC Basic Mobility Inpatient Raw Scores.   [facility] Inpatient Basic Mobility Short Form Scoring Example   1. Add the number values associated with the response to each item. For example, items totals yield a Raw Score of 21.   2. Match the raw score to the t-Scale scores (t-Scale score = 50.25, SE = 4.69).   3. Find the associated CMS % (CMS % = 28.97%).   4. Locate the correct CMS Functional Modifier Code, or G Code (G code = CJ)     NOTE: Each -PAC Short Form has a separate conversion table. Make sure that you use the correct conversion table.       Instruction Manual - page 45 contains conversion table

## 2025-06-11 NOTE — PRE ADMISSION SCREENING
"               [facility] Risk Assessment Scale    Patient Name: Tha Guzman  YOB: 1940  MRN: [id number]            RIsk Factor Measure Recommendation Patient Data Scale/Score   BMI >40 Reconsider surgery, weight loss   Estimated body mass index is 26.57 kg/m² as calculated from the following:    Height as of this encounter: 5' 3" (1.6 m).    Weight as of this encounter: 68 kg (150 lb).   [] 0 = 1 - 24.9  [x] 1 = 25-29.9  [] 2 = 30-34.9  [] 3 = 35-39.9  [] 4 = 40-44.9  [] 5 = 45-99.9   Hemoglobin AIC (if applicable) >9 Delay surgery until DM under control  Refer for:  Nutrition Therapy  Exercise   Medication    Lab Results   Component Value Date    HGBA1C 4.8 01/17/2025       Lab Results   Component Value Date    GLU 86 05/02/2025      [x] 0 = 4.0-5.6  [] 1 = 5.7-6.4  [] 2 = 6.5-6.9  [] 3 = 7.0-7.9  [] 4 = 8.0-8.9  [] 5 = 9.0-12   Hemoglobin (Anemia) <9 Delay surgery   Correct anemia Lab Results   Component Value Date    HGB 14.1 05/02/2025    [] 20 - <9.0                    Albumin <3 Delay surgery &Workup Lab Results   Component Value Date    ALBUMIN 3.8 05/02/2025    [] 20 - <3.0   Smoking Cessation >4 Weeks Delay Surgery  Refer to OP Cessation Class    Former Smoker  Quit: 2007 [] 20 - current smoker                                _____ PPD                    Hx of MI, PE, Arrhythmia, CVA, DVT <30 Days Delay Surgery    N/A [] 20      Infection Variable Delay surgery and re-evaluate   N/A [] 20 - recent/current infection     Depression (PHQ) >10 out of 27 Delay Surgery and re-evaluate  Medication  Counseling              [x] 0     []1     []2     []3      []4      [] 5                    (1-4)      (5-9)  (10-14)  (15-19)   (20-27)     Memory Impairment & Memory loss (Mini-Cog Screening Tool) Advanced dementia and/or Parkinson's Reconsider surgery     [x] 0     []1     []2     []3     []4     [] 5     Physical Conditioning (Modified AM-PAC Per Physical Therapy at [facility]) Unable to ambulate on day of " surgery Delay surgery and re-evaluate  Pre-Rehabilitation   (PT evaluation)       [x]  0   []4       []8     []12        []16     []20       (<20%)   (<40%)   (<60%)   (<80% )    (>80%)     Home Environment/Caregiver support  (Per /Navigator Interview)    Availability of basic services and/or approprate assistance during post-operative period Delay surgery and re-evaluate  Safe home environment  Health   1 week post-surgery  Transportation  availability  Ability to obtain DME/Medications post-op    [x] 0     []1     []2     []3     []4     [] 5  [x] 0     []1     []2     []3     []4     [] 5  [x] 0     []1     []2     []3     []4     [] 5  [x] 0     []1     []2     []3     []4     [] 5         MD Contact: Dr. Cardoza Comments:  Total Score:  1

## 2025-06-11 NOTE — PRE ADMISSION SCREENING
JOINT CAMP ASSESSMENT    Name Tha Guzman   MRN [id number]    Age/Sex 85 y.o. male    Surgeon Dr. Jose Guadalupe Cardoza   Joint Camp Date 6/11/2025   Surgery Date 6/24/2025   Procedure Left Knee Arthroplasty   Insurance Payor: [employer] MANAGED MEDICARE / Plan: HU[employer] MEDICARE PPO / Product Type: Medicare Advantage /    Care Team Patient Care Team:  Bart Burrows MD as PCP - General (Family Medicine)    Pharmacy   [id number] - [place], [place] - [address]  [address]  [place] [address]  Phone: [phone] Fax: [phone]     AM-PAC Score   24   Risk Assessment Score 1     Past Medical History:   Diagnosis Date    Arthritis     Elevated PSA     Essential hypertension, benign     Mixed hyperlipidemia     Prostatitis, unspecified     Wears glasses        Past Surgical History:   Procedure Laterality Date    cataracts      COLONOSCOPY N/A 8/14/2019    Procedure: COLONOSCOPY;  Surgeon: Dominick Sr MD;  Location[facility] ENDO;  Service: Endoscopy;  Laterality: N/A;    COLONOSCOPY N/A 8/2/2023    Procedure: COLONOSCOPY;  Surgeon: Dominick Sr MD;  Location: [facility] ENDO;  Service: Endoscopy;  Laterality: N/A;    HEMORRHOID SURGERY      KNEE ARTHROSCOPY W/ MENISCAL REPAIR Left     TONSILLECTOMY           Home Enviroment     Living Arrangement: Lives alone  Home Environment: 1-story house/ trailer, tub-shower  Home Safety Concerns: Pets in the home: cats (2).    DISCHARGE CAREGIVER/SUPPORT SYSTEM     Identified post-op caregiver: Patient has children / family / friends to help, son.  Patient's caregiver(s) will be able to provide physical assistance. Patient will have someone to assist overnight.      Caregiver present at pre-op interview:  Yes      PRE-OPERATIVE FUNCTIONAL STATUS     Employment: Retired    Pre-op Functional Status: Patient is independent with mobility/ambulation, transfers, ADL's, IADL's.    Use of assistive device for ambulation: straight cane  ADL: self care  ADL Limitations:  difficulty with walking  Medical Restrictions: Unstable ambulation and Decreased range of motions in extremities    POTENTIAL BARRIERS TO DISCHARGE/POTENTIAL POST-OP COMPLICATIONS     Patient with hx of HTN. POSSIBLE SAME DAY DISCHARGE>    DISCHARGE PLAN     Expected LOS of 1 days or less for joint replacement discussed with patient. We also discussed a discharge path of HH for approximately two weeks with a transition to outpatient PT on the third week given no post-op complications.      Patient in agreement with discharge plan: No. Patient discussed SNF/Rehab with Dr. Cardoza. Explained elective outpatient classification to patient and son.    Discharge to: Discharge home with home health (PT/OT) x2 weeks with transition to outpatient PT     HH:  [facility] ([place], [place]).  Patient disclosure form completed and sent to case management for upload to the medical record.      OP PT: [facility] ([facility])     Home DME: single point cane    Needed DME at D/C: rolling walker, bedside commode, and tub transfer bench. Patient to purchase BSC & Tub Transfer Bench from retail prior to surgery.     Rx: Per Dr. Cardoza at discharge     Meds to Beds: Yes  Patient expected to discharge on Aspirin 81mg by mouth twice daily for DVT prophylaxis.

## 2025-06-12 DIAGNOSIS — M17.12 PRIMARY OSTEOARTHRITIS OF LEFT KNEE: Primary | ICD-10-CM

## 2025-06-13 ENCOUNTER — TELEPHONE (OUTPATIENT)
Dept: ORTHOPEDICS | Facility: CLINIC | Age: 85
End: 2025-06-13
Payer: MEDICARE

## 2025-06-13 NOTE — TELEPHONE ENCOUNTER
----- Message from Med Assistant Gandara sent at 6/13/2025  1:32 PM CDT -----  Contact: pt  Wants rehab hospital in [place],   Spoke with [facility], in network   Wants order for [facility] rehab hosp   Call back

## 2025-06-16 NOTE — TELEPHONE ENCOUNTER
Called and spoke with pt informed him that we can not place inpatient rehab orders for after an outpatient procedure. Pt understood. Confirmed with pt that home health orders have been placed and he will go home same day as procedure

## 2025-06-23 ENCOUNTER — ANESTHESIA EVENT (OUTPATIENT)
Dept: SURGERY | Facility: HOSPITAL | Age: 85
End: 2025-06-23
Payer: MEDICARE

## 2025-06-23 DIAGNOSIS — M17.12 PRIMARY OSTEOARTHRITIS OF LEFT KNEE: Primary | ICD-10-CM

## 2025-06-23 RX ORDER — CYCLOBENZAPRINE HCL 10 MG
10 TABLET ORAL 3 TIMES DAILY PRN
Qty: 30 TABLET | Refills: 0 | Status: SHIPPED | OUTPATIENT
Start: 2025-06-23 | End: 2025-07-03

## 2025-06-23 RX ORDER — ACETAMINOPHEN 500 MG
1000 TABLET ORAL EVERY 8 HOURS PRN
Qty: 30 TABLET | Refills: 0 | Status: SHIPPED | OUTPATIENT
Start: 2025-06-23

## 2025-06-23 RX ORDER — ASPIRIN 81 MG/1
81 TABLET ORAL 2 TIMES DAILY
Qty: 56 TABLET | Refills: 0 | Status: SHIPPED | OUTPATIENT
Start: 2025-06-23 | End: 2026-06-23

## 2025-06-23 RX ORDER — ONDANSETRON 4 MG/1
4 TABLET, FILM COATED ORAL EVERY 6 HOURS PRN
Qty: 30 TABLET | Refills: 0 | Status: SHIPPED | OUTPATIENT
Start: 2025-06-23

## 2025-06-23 RX ORDER — OXYCODONE AND ACETAMINOPHEN 5; 325 MG/1; MG/1
1 TABLET ORAL EVERY 6 HOURS PRN
Qty: 28 TABLET | Refills: 0 | Status: SHIPPED | OUTPATIENT
Start: 2025-06-23

## 2025-06-23 RX ORDER — IBUPROFEN 600 MG/1
600 TABLET, FILM COATED ORAL EVERY 6 HOURS PRN
Qty: 30 TABLET | Refills: 0 | Status: SHIPPED | OUTPATIENT
Start: 2025-06-23

## 2025-06-24 ENCOUNTER — HOSPITAL ENCOUNTER (OUTPATIENT)
Facility: HOSPITAL | Age: 85
Discharge: HOME | End: 2025-06-24
Attending: ORTHOPAEDIC SURGERY | Admitting: ORTHOPAEDIC SURGERY
Payer: MEDICARE

## 2025-06-24 ENCOUNTER — ANESTHESIA (OUTPATIENT)
Dept: SURGERY | Facility: HOSPITAL | Age: 85
End: 2025-06-24
Payer: MEDICARE

## 2025-06-24 DIAGNOSIS — Z01.818 PRE-OP TESTING: ICD-10-CM

## 2025-06-24 DIAGNOSIS — M17.12 PRIMARY OSTEOARTHRITIS OF LEFT KNEE: Primary | ICD-10-CM

## 2025-06-24 PROCEDURE — 25000003 PHARM REV CODE 250: Performed by: NURSE ANESTHETIST, CERTIFIED REGISTERED

## 2025-06-24 PROCEDURE — 97161 PT EVAL LOW COMPLEX 20 MIN: CPT

## 2025-06-24 PROCEDURE — 63600175 PHARM REV CODE 636 W HCPCS

## 2025-06-24 PROCEDURE — 63600175 PHARM REV CODE 636 W HCPCS: Performed by: ANESTHESIOLOGY

## 2025-06-24 PROCEDURE — 27447 TOTAL KNEE ARTHROPLASTY: CPT | Mod: LT,,, | Performed by: ORTHOPAEDIC SURGERY

## 2025-06-24 PROCEDURE — 25000003 PHARM REV CODE 250: Performed by: ORTHOPAEDIC SURGERY

## 2025-06-24 PROCEDURE — 37000009 HC ANESTHESIA EA ADD 15 MINS: Performed by: ORTHOPAEDIC SURGERY

## 2025-06-24 PROCEDURE — 25000003 PHARM REV CODE 250: Performed by: ANESTHESIOLOGY

## 2025-06-24 PROCEDURE — 94799 UNLISTED PULMONARY SVC/PX: CPT

## 2025-06-24 PROCEDURE — 63600175 PHARM REV CODE 636 W HCPCS: Performed by: ORTHOPAEDIC SURGERY

## 2025-06-24 PROCEDURE — C1769 GUIDE WIRE: HCPCS | Performed by: ORTHOPAEDIC SURGERY

## 2025-06-24 PROCEDURE — 63600175 PHARM REV CODE 636 W HCPCS: Performed by: NURSE ANESTHETIST, CERTIFIED REGISTERED

## 2025-06-24 PROCEDURE — 36000713 HC OR TIME LEV V EA ADD 15 MIN: Performed by: ORTHOPAEDIC SURGERY

## 2025-06-24 PROCEDURE — 71000016 HC POSTOP RECOV ADDL HR: Performed by: ORTHOPAEDIC SURGERY

## 2025-06-24 PROCEDURE — 71000015 HC POSTOP RECOV 1ST HR: Performed by: ORTHOPAEDIC SURGERY

## 2025-06-24 PROCEDURE — 0055T BONE SRGRY CMPTR CT/MRI IMAG: CPT | Mod: ,,, | Performed by: ORTHOPAEDIC SURGERY

## 2025-06-24 PROCEDURE — 27200688 HC TRAY, SPINAL-HYPER/ ISOBARIC: Performed by: ANESTHESIOLOGY

## 2025-06-24 PROCEDURE — 27201423 OPTIME MED/SURG SUP & DEVICES STERILE SUPPLY: Performed by: ORTHOPAEDIC SURGERY

## 2025-06-24 PROCEDURE — 37000008 HC ANESTHESIA 1ST 15 MINUTES: Performed by: ORTHOPAEDIC SURGERY

## 2025-06-24 PROCEDURE — 97116 GAIT TRAINING THERAPY: CPT

## 2025-06-24 PROCEDURE — 71000033 HC RECOVERY, INTIAL HOUR: Performed by: ORTHOPAEDIC SURGERY

## 2025-06-24 PROCEDURE — 36000712 HC OR TIME LEV V 1ST 15 MIN: Performed by: ORTHOPAEDIC SURGERY

## 2025-06-24 PROCEDURE — 63600175 PHARM REV CODE 636 W HCPCS: Mod: TB,JZ | Performed by: ORTHOPAEDIC SURGERY

## 2025-06-24 PROCEDURE — C1776 JOINT DEVICE (IMPLANTABLE): HCPCS | Performed by: ORTHOPAEDIC SURGERY

## 2025-06-24 PROCEDURE — C1713 ANCHOR/SCREW BN/BN,TIS/BN: HCPCS | Performed by: ORTHOPAEDIC SURGERY

## 2025-06-24 PROCEDURE — 27200750 HC INSULATED NEEDLE/ STIMUPLEX: Performed by: ANESTHESIOLOGY

## 2025-06-24 PROCEDURE — 64447 NJX AA&/STRD FEMORAL NRV IMG: CPT | Performed by: ANESTHESIOLOGY

## 2025-06-24 PROCEDURE — 99900035 HC TECH TIME PER 15 MIN (STAT)

## 2025-06-24 PROCEDURE — 71000039 HC RECOVERY, EACH ADD'L HOUR: Performed by: ORTHOPAEDIC SURGERY

## 2025-06-24 DEVICE — TIBIAL BEARING INSERT
Type: IMPLANTABLE DEVICE | Site: KNEE | Status: FUNCTIONAL
Brand: TRIATHLON

## 2025-06-24 DEVICE — PATELLA
Type: IMPLANTABLE DEVICE | Site: KNEE | Status: FUNCTIONAL
Brand: TRIATHLON

## 2025-06-24 DEVICE — TOBRA FULL DOSE ANTIBIOTIC BONE CEMENT, 10 PACK CATALOG NUMBER IS 6197-9-010
Type: IMPLANTABLE DEVICE | Site: KNEE | Status: FUNCTIONAL
Brand: SIMPLEX

## 2025-06-24 DEVICE — CRUCIATE RETAINING FEMORAL
Type: IMPLANTABLE DEVICE | Site: KNEE | Status: FUNCTIONAL
Brand: TRIATHLON

## 2025-06-24 DEVICE — PRIMARY TIBIAL BASEPLATE
Type: IMPLANTABLE DEVICE | Site: KNEE | Status: FUNCTIONAL
Brand: TRIATHLON

## 2025-06-24 RX ORDER — PROPOFOL 10 MG/ML
VIAL (ML) INTRAVENOUS CONTINUOUS PRN
Status: DISCONTINUED | OUTPATIENT
Start: 2025-06-24 | End: 2025-06-24

## 2025-06-24 RX ORDER — SODIUM CHLORIDE, SODIUM LACTATE, POTASSIUM CHLORIDE, CALCIUM CHLORIDE 600; 310; 30; 20 MG/100ML; MG/100ML; MG/100ML; MG/100ML
INJECTION, SOLUTION INTRAVENOUS CONTINUOUS
Status: DISCONTINUED | OUTPATIENT
Start: 2025-06-24 | End: 2025-06-24 | Stop reason: HOSPADM

## 2025-06-24 RX ORDER — TRANEXAMIC ACID 1 G/10ML
INJECTION, SOLUTION INTRAVENOUS
Status: DISCONTINUED | OUTPATIENT
Start: 2025-06-24 | End: 2025-06-24

## 2025-06-24 RX ORDER — BUPIVACAINE HYDROCHLORIDE 7.5 MG/ML
INJECTION, SOLUTION EPIDURAL; RETROBULBAR
Status: COMPLETED | OUTPATIENT
Start: 2025-06-24 | End: 2025-06-24

## 2025-06-24 RX ORDER — ACETAMINOPHEN 500 MG
1000 TABLET ORAL
Status: COMPLETED | OUTPATIENT
Start: 2025-06-24 | End: 2025-06-24

## 2025-06-24 RX ORDER — MIDAZOLAM HYDROCHLORIDE 1 MG/ML
INJECTION INTRAMUSCULAR; INTRAVENOUS
Status: DISCONTINUED | OUTPATIENT
Start: 2025-06-24 | End: 2025-06-24

## 2025-06-24 RX ORDER — OXYCODONE HYDROCHLORIDE 5 MG/1
5 TABLET ORAL
Status: DISCONTINUED | OUTPATIENT
Start: 2025-06-24 | End: 2025-06-24 | Stop reason: HOSPADM

## 2025-06-24 RX ORDER — BUPIVACAINE 13.3 MG/ML
INJECTION, SUSPENSION, LIPOSOMAL INFILTRATION
Status: COMPLETED | OUTPATIENT
Start: 2025-06-24 | End: 2025-06-24

## 2025-06-24 RX ORDER — MUPIROCIN 20 MG/G
OINTMENT TOPICAL
Status: DISCONTINUED | OUTPATIENT
Start: 2025-06-24 | End: 2025-06-24 | Stop reason: HOSPADM

## 2025-06-24 RX ORDER — FENTANYL CITRATE 50 UG/ML
25 INJECTION, SOLUTION INTRAMUSCULAR; INTRAVENOUS EVERY 5 MIN PRN
Status: DISCONTINUED | OUTPATIENT
Start: 2025-06-24 | End: 2025-06-24 | Stop reason: HOSPADM

## 2025-06-24 RX ORDER — CELECOXIB 100 MG/1
400 CAPSULE ORAL ONCE
Status: COMPLETED | OUTPATIENT
Start: 2025-06-24 | End: 2025-06-24

## 2025-06-24 RX ORDER — EPHEDRINE SULFATE 50 MG/ML
INJECTION, SOLUTION INTRAVENOUS
Status: DISCONTINUED | OUTPATIENT
Start: 2025-06-24 | End: 2025-06-24

## 2025-06-24 RX ORDER — BUPIVACAINE HYDROCHLORIDE 5 MG/ML
INJECTION, SOLUTION EPIDURAL; INTRACAUDAL; PERINEURAL
Status: COMPLETED | OUTPATIENT
Start: 2025-06-24 | End: 2025-06-24

## 2025-06-24 RX ORDER — ONDANSETRON HYDROCHLORIDE 2 MG/ML
INJECTION, SOLUTION INTRAMUSCULAR; INTRAVENOUS
Status: DISCONTINUED | OUTPATIENT
Start: 2025-06-24 | End: 2025-06-24

## 2025-06-24 RX ORDER — FENTANYL CITRATE 50 UG/ML
INJECTION, SOLUTION INTRAMUSCULAR; INTRAVENOUS
Status: DISCONTINUED | OUTPATIENT
Start: 2025-06-24 | End: 2025-06-24

## 2025-06-24 RX ORDER — CELECOXIB 100 MG/1
200 CAPSULE ORAL
Status: DISCONTINUED | OUTPATIENT
Start: 2025-06-24 | End: 2025-06-24

## 2025-06-24 RX ORDER — CEFAZOLIN 2 G/1
2 INJECTION, POWDER, FOR SOLUTION INTRAMUSCULAR; INTRAVENOUS
Status: COMPLETED | OUTPATIENT
Start: 2025-06-24 | End: 2025-06-24

## 2025-06-24 RX ORDER — OXYCODONE HCL 10 MG/1
10 TABLET, FILM COATED, EXTENDED RELEASE ORAL ONCE
Status: COMPLETED | OUTPATIENT
Start: 2025-06-24 | End: 2025-06-24

## 2025-06-24 RX ORDER — METOCLOPRAMIDE HYDROCHLORIDE 5 MG/ML
10 INJECTION INTRAMUSCULAR; INTRAVENOUS EVERY 10 MIN PRN
Status: DISCONTINUED | OUTPATIENT
Start: 2025-06-24 | End: 2025-06-24 | Stop reason: HOSPADM

## 2025-06-24 RX ORDER — LIDOCAINE HYDROCHLORIDE 10 MG/ML
1 INJECTION, SOLUTION EPIDURAL; INFILTRATION; INTRACAUDAL; PERINEURAL ONCE
Status: DISCONTINUED | OUTPATIENT
Start: 2025-06-24 | End: 2025-06-24 | Stop reason: HOSPADM

## 2025-06-24 RX ADMIN — ACETAMINOPHEN 1000 MG: 500 TABLET ORAL at 06:06

## 2025-06-24 RX ADMIN — CELECOXIB 400 MG: 100 CAPSULE ORAL at 06:06

## 2025-06-24 RX ADMIN — KETOROLAC TROMETHAMINE: 30 INJECTION, SOLUTION INTRAMUSCULAR; INTRAVENOUS at 08:06

## 2025-06-24 RX ADMIN — EPHEDRINE SULFATE 10 MG: 50 INJECTION, SOLUTION INTRAMUSCULAR; INTRAVENOUS; SUBCUTANEOUS at 07:06

## 2025-06-24 RX ADMIN — MIDAZOLAM HYDROCHLORIDE 2 MG: 1 INJECTION, SOLUTION INTRAMUSCULAR; INTRAVENOUS at 07:06

## 2025-06-24 RX ADMIN — EPHEDRINE SULFATE 10 MG: 50 INJECTION, SOLUTION INTRAMUSCULAR; INTRAVENOUS; SUBCUTANEOUS at 08:06

## 2025-06-24 RX ADMIN — SODIUM CHLORIDE, SODIUM GLUCONATE, SODIUM ACETATE, POTASSIUM CHLORIDE AND MAGNESIUM CHLORIDE: 526; 502; 368; 37; 30 INJECTION, SOLUTION INTRAVENOUS at 08:06

## 2025-06-24 RX ADMIN — FENTANYL CITRATE 50 MCG: 0.05 INJECTION, SOLUTION INTRAMUSCULAR; INTRAVENOUS at 08:06

## 2025-06-24 RX ADMIN — TRANEXAMIC ACID 700 MG: 100 INJECTION, SOLUTION INTRAVENOUS at 09:06

## 2025-06-24 RX ADMIN — SODIUM CHLORIDE, SODIUM GLUCONATE, SODIUM ACETATE, POTASSIUM CHLORIDE AND MAGNESIUM CHLORIDE: 526; 502; 368; 37; 30 INJECTION, SOLUTION INTRAVENOUS at 06:06

## 2025-06-24 RX ADMIN — TRANEXAMIC ACID 700 MG: 100 INJECTION, SOLUTION INTRAVENOUS at 07:06

## 2025-06-24 RX ADMIN — FENTANYL CITRATE 50 MCG: 0.05 INJECTION, SOLUTION INTRAMUSCULAR; INTRAVENOUS at 07:06

## 2025-06-24 RX ADMIN — OXYCODONE HYDROCHLORIDE 5 MG: 5 TABLET ORAL at 10:06

## 2025-06-24 RX ADMIN — PROPOFOL 50 MCG/KG/MIN: 10 INJECTION, EMULSION INTRAVENOUS at 07:06

## 2025-06-24 RX ADMIN — BUPIVACAINE 20 ML: 13.3 INJECTION, SUSPENSION, LIPOSOMAL INFILTRATION at 07:06

## 2025-06-24 RX ADMIN — MUPIROCIN 1 G: 20 OINTMENT TOPICAL at 06:06

## 2025-06-24 RX ADMIN — BUPIVACAINE HYDROCHLORIDE 1.3 ML: 7.5 INJECTION, SOLUTION EPIDURAL; RETROBULBAR at 07:06

## 2025-06-24 RX ADMIN — BUPIVACAINE HYDROCHLORIDE 10 ML: 5 INJECTION, SOLUTION EPIDURAL; INTRACAUDAL; PERINEURAL at 07:06

## 2025-06-24 RX ADMIN — CEFAZOLIN 2 G: 2 INJECTION, POWDER, FOR SOLUTION INTRAMUSCULAR; INTRAVENOUS at 07:06

## 2025-06-24 RX ADMIN — ONDANSETRON 4 MG: 2 INJECTION INTRAMUSCULAR; INTRAVENOUS at 07:06

## 2025-06-24 RX ADMIN — OXYCODONE HYDROCHLORIDE 10 MG: 10 TABLET, FILM COATED, EXTENDED RELEASE ORAL at 06:06

## 2025-06-24 NOTE — DISCHARGE INSTRUCTIONS
"Discharge Instructions: After Your Surgery/Procedure  Youve just had surgery. During surgery you were given medicine called anesthesia to keep you relaxed and free of pain. After surgery you may have some pain or nausea. This is common. Here are some tips for feeling better and getting well after surgery.     Stay on schedule with your medication.   Going home  Your doctor or nurse will show you how to take care of yourself when you go home. He or she will also answer your questions. Have an adult family member or friend drive you home.      For your safety we recommend these precaution for the first 24 hours after your procedure:  Do not drive or use heavy equipment.  Do not make important decisions or sign legal papers.  Do not drink alcohol.  Have someone stay with you, if needed. He or she can watch for problems and help keep you safe.  Your concentration, balance, coordination, and judgement may be impaired for many hours after anesthesia.  Use caution when ambulating or standing up.     You may feel weak and "washed out" after anesthesia and surgery.      Subtle residual effects of general anesthesia or sedation with regional / local anesthesia can last more than 24 hours.  Rest for the remainder of the day or longer if your Doctor/Surgeon has advised you to do so.  Although you may feel normal within the first 24 hours, your reflexes and mental ability may be impaired without you realizing it.  You may feel dizzy, lightheaded or sleepy for 24 hours or longer.      Be sure to go to all follow-up visits with your doctor. And rest after your surgery for as long as your doctor tells you to.  Coping with pain  If you have pain after surgery, pain medicine will help you feel better. Take it as told, before pain becomes severe. Also, ask your doctor or pharmacist about other ways to control pain. This might be with heat, ice, or relaxation. And follow any other instructions your surgeon or nurse gives you.  Tips " for taking pain medicine  To get the best relief possible, remember these points:  Pain medicines can upset your stomach. Taking them with a little food may help.  Most pain relievers taken by mouth need at least 20 to 30 minutes to start to work.  Taking medicine on a schedule can help you remember to take it. Try to time your medicine so that you can take it before starting an activity. This might be before you get dressed, go for a walk, or sit down for dinner.  Constipation is a common side effect of pain medicines. Call your doctor before taking any medicines such as laxatives or stool softeners to help ease constipation. Also ask if you should skip any foods. Drinking lots of fluids and eating foods such as fruits and vegetables that are high in fiber can also help. Remember, do not take laxatives unless your surgeon has prescribed them.  Drinking alcohol and taking pain medicine can cause dizziness and slow your breathing. It can even be deadly. Do not drink alcohol while taking pain medicine.  Pain medicine can make you react more slowly to things. Do not drive or run machinery while taking pain medicine.  Your health care provider may tell you to take acetaminophen to help ease your pain. Ask him or her how much you are supposed to take each day. Acetaminophen or other pain relievers may interact with your prescription medicines or other over-the-counter (OTC) drugs. Some prescription medicines have acetaminophen and other ingredients. Using both prescription and OTC acetaminophen for pain can cause you to overdose. Read the labels on your OTC medicines with care. This will help you to clearly know the list of ingredients, how much to take, and any warnings. It may also help you not take too much acetaminophen. If you have questions or do not understand the information, ask your pharmacist or health care provider to explain it to you before you take the OTC medicine.  Managing nausea  Some people have an  upset stomach after surgery. This is often because of anesthesia, pain, or pain medicine, or the stress of surgery. These tips will help you handle nausea and eat healthy foods as you get better. If you were on a special food plan before surgery, ask your doctor if you should follow it while you get better. These tips may help:  Do not push yourself to eat. Your body will tell you when to eat and how much.  Start off with clear liquids and soup. They are easier to digest.  Next try semi-solid foods, such as mashed potatoes, applesauce, and gelatin, as you feel ready.  Slowly move to solid foods. Dont eat fatty, rich, or spicy foods at first.  Do not force yourself to have 3 large meals a day. Instead eat smaller amounts more often.  Take pain medicines with a small amount of solid food, such as crackers or toast, to avoid nausea.     Call your surgeon if  You still have pain an hour after taking medicine. The medicine may not be strong enough.  You feel too sleepy, dizzy, or groggy. The medicine may be too strong.  You have side effects like nausea, vomiting, or skin changes, such as rash, itching, or hives.       If you have obstructive sleep apnea  You were given anesthesia medicine during surgery to keep you comfortable and free of pain. After surgery, you may have more apnea spells because of this medicine and other medicines you were given. The spells may last longer than usual.   At home:  Keep using the continuous positive airway pressure (CPAP) device when you sleep. Unless your health care provider tells you not to, use it when you sleep, day or night. CPAP is a common device used to treat obstructive sleep apnea.  Talk with your provider before taking any pain medicine, muscle relaxants, or sedatives. Your provider will tell you about the possible dangers of taking these medicines.  © 0211-7408 [employer]. [address] [place] [address], [place] [address]. All rights reserved. This information is  not intended as a substitute for professional medical care. Always follow your healthcare professional's instructions.          Using an Incentive Spirometer    An incentive spirometer is a device that helps you do deep breathing exercises. These exercises expand your lungs, aid in circulation, and help prevent pneumonia. Deep breathing exercises also help you breathe better and improve the function of your lungs by:  Keeping your lungs clear  Strengthening your breathing muscles  Helping prevent respiratory complications or problems  The incentive spirometer gives you a way to take an active part in recover. A nurse or therapist will teach you breathing exercises. To do these exercises, you will breathe in through your mouth and not your nose. The incentive spirometer only works correctly if you breathe in through your mouth.  Steps to clear lungs  Step 1. Exhale normally. Then, inhale normally.  Relax and breathe out.  Step 2. Place your lips tightly around the mouthpiece.  Make sure the device is upright and not tilted.  Step 3. Inhale as much air as you can through the mouthpiece (don't breath through your nose).  Inhale slowly and deeply.  Hold your breath long enough to keep the balls or disk raised for at least 3 to 5 seconds, or as instructed by your healthcare provider.  Some spirometers have an indicator to let you know that you are breathing in too fast. If the indicator goes off, breathe in more slowly.  Step 4. Repeat the exercise regularly.  Do this exercise every hour while you're awake, or as instructed by your healthcare provider.  If you were taught deep breathing and coughing exercises, do them regularly as instructed by your healthcare provider.           Post op instructions for prevention of DVT  What is deep vein thrombosis?  Deep vein thrombosis (DVT) is the medical term for blood clots in the deep veins of the leg.  These blood clots can be dangerous.  A DVT can block a blood vessel and keep  blood from getting where it needs to go.  Another problem is that the clot can travel to other parts of the body such as the lungs.  A clot that travels to the lungs is called a pulmonary embolus (PE) and can cause serious problems with breathing which can lead to death.  Am I at risk for DVT/PE?  If you are not very active, you are at risk of DVT.  Anyone confined to bed, sitting for long periods of time, recovering from surgery, etc. increases the risk of DVT.  Other risk factors are cancer diagnosis, certain medications, estrogen replacement in any form,older age, obesity, pregnancy, smoking, history of clotting disorders, and dehydration.  How will I know if I have a DVT?  Swelling in the lower leg  Pain  Warmth, redness, hardness or bulging of the vein  If you have any of these symptoms, call your doctors office right away.  Some people will not have any symptoms until the clot moves to the lungs.  What are the symptoms of a PE?  Panting, shortness of breath, or trouble breathing  Sharp, knife-like chest pain when you breathe  Coughing or coughing up blood  Rapid heartbeat  If you have any of these symptoms or get worse quickly, call 911 for emergency treatment.  How can I prevent a DVT?  Avoid long periods of inactivity and dont cross your legs--get up and walk around every hour or so.  Stay active--walking after surgery is highly encouraged.  This means you should get out of the house and walk in the neighborhood.  Going up and down stairs will not impair healing (unless advised against such activity by your doctor).    Drink plenty of noncaffeinated, nonalcoholic fluids each day to prevent dehydration.  Wear special support stockings, if they have been advised by your doctor.  If you travel, stop at least once an hour and walk around.  Avoid smoking (assistance with stopping is available through your healthcare provider)  Always notify your doctor if you are not able to follow the post operative  instructions that are given to you at the time of discharge.  It may be necessary to prescribe one of the medications available to prevent DVT.          Exparel(bupivacaine) has been injected to provide approximately 72 hours of reduced pain after your surgery.  Do not remove the bracelet for five days.  Report to your doctor as soon as possible if you experience any of the following:   Restlessness   Anxiety   Speech problems    Lightheadedness   Numbness and tingling of the mouth and lips   Seizures    Metallic taste   Blurred vision   Tremors    Twitching   Depression   Extreme drowsiness  Avoid additional use of local anesthetics (such as dental procedures) for five days (96 hours).            We hope your stay was comfortable as you heal now, mend and rest.    For we have enjoyed taking care of you by giving your our best.    And as you get better, by regaining your health and strength;   We count it as a privilege to have served you and hope your time at [facility] was well spent.      Thank  You!!!

## 2025-06-24 NOTE — PT/OT/SLP EVAL
Physical Therapy Evaluation and Discharge Note    Patient Name:  Tha Guzman   MRN:  [id number]    Recommendations:     Discharge Recommendations: Low Intensity Therapy  Discharge Equipment Recommendations: none   Barriers to discharge: None    Assessment:     Tha Guzman is a 85 y.o. male admitted with a medical diagnosis of Left TKA. .  At this time, patient is scheduled to discharge today and appears to be safe with mobility. The patient completed therapeutic exercises: ankle pumps, quad sets, aarom heel slides, and aarom hip abductions on the LLE x10. He ambulated x150ft, x200ft with CGA and RW with a continuous pattern. Patient will benefit from continued PT services with HHPT to increase strength and range of motion to increase safety with mobility.     Recent Surgery: Procedure(s) (LRB):  ROBOTIC ARTHROPLASTY, KNEE, TOTAL (Left) * Day of Surgery *    Plan:     During this hospitalization, patient does not require further acute PT services.  Please re-consult if situation changes.      Subjective     Chief Complaint: Sore quad  Patient/Family Comments/goals: Go home  Pain/Comfort:  Pain Rating 1: 0/10    Patients cultural, spiritual, Oriental orthodox conflicts given the current situation:      Living Environment:  Pt lives in a 1 story home alone with 1 threshold step.  Prior to admission, patients level of function was independent.  Equipment used at home: walker, rolling, cane, straight, bedside commode.  DME owned (not currently used): none.  Upon discharge, patient will have assistance from son.    Objective:     Communicated with nurse prior to session.  Patient found supine with cryotherapy, peripheral IV upon PT entry to room.    General Precautions: Standard, fall    Orthopedic Precautions:LLE weight bearing as tolerated   Braces: N/A  Respiratory Status: Room air    Exams:  RLE ROM: WFL  RLE Strength: WFL  LLE ROM: -5* extension AROM. 100* flexion AROM  LLE Strength: WFL    Functional Mobility:  Bed  Mobility:     Supine to Sit: independence  Transfers:     Sit to Stand:  contact guard assistance with rolling walker  Gait: x150ft, x200ft CGA with RW  Stairs:  Pt ascended/descended 1 threshold step with Rolling Walker with no handrails with Stand-by Assistance.     AM-PAC 6 CLICK MOBILITY  Total Score:21       Treatment and Education:  Pt educated on importance of early mobilization.  Gait training: x150ft, x200ft CGA with RW and verbal cues. Continuous step through gait pattern with decreased step length and right genu varus.   Therapeutic exercise: ankle pumps, quad sets, aarom heel slides, and aarom hip abductions on the LLE x10  Ascended/descended 1 threshold step SBA and RW. With demonstration and verbal instruction.  AM-PAC 6 CLICK MOBILITY  Total Score:21     Patient left up in chair with call button in reach and nurse notified.    GOALS:   Multidisciplinary Problems       Physical Therapy Goals       Not on file                    DME Justifications:  No DME recommended requiring DME justifications    History:     Past Medical History:   Diagnosis Date    Arthritis     Elevated PSA     Essential hypertension, benign     Mixed hyperlipidemia     Prostatitis, unspecified     Wears glasses        Past Surgical History:   Procedure Laterality Date    cataracts      COLONOSCOPY N/A 8/14/2019    Procedure: COLONOSCOPY;  Surgeon: Dominick Sr MD;  Location: [facility];  Service: Endoscopy;  Laterality: N/A;    COLONOSCOPY N/A 8/2/2023    Procedure: COLONOSCOPY;  Surgeon: Dominick Sr MD;  Location: [facility];  Service: Endoscopy;  Laterality: N/A;    HEMORRHOID SURGERY      KNEE ARTHROSCOPY W/ MENISCAL REPAIR Left     TONSILLECTOMY         Time Tracking:     PT Received On: 06/24/25  PT Start Time: 1104     PT Stop Time: 1135  PT Total Time (min): 31 min     Billable Minutes: Evaluation 15 and Gait Training 16      06/24/2025

## 2025-06-24 NOTE — PLAN OF CARE
Patient awake, alert, oriented. Vital signs stable. Patient verbalizes readiness for discharge. Patient cleared for discharge by PT. Patient reports having all necessary DME. Discharge instructions reviewed with patient and patient's son. Patient and patient's son verbalized understanding. IV removed, catheter intact. Dressing to L knee clean, dry, and intact. All belongings returned to patient. Patient transferred to car via wheelchair per Ivet MURILLO RN. Safety maintained.

## 2025-06-24 NOTE — ANESTHESIA POSTPROCEDURE EVALUATION
Anesthesia Post Evaluation    Patient: Tha Guzman    Procedure(s) Performed: Procedure(s) (LRB):  ROBOTIC ARTHROPLASTY, KNEE, TOTAL (Left)    Final Anesthesia Type: spinal      Patient location during evaluation: PACU  Patient participation: Yes- Able to Participate  Level of consciousness: awake and alert  Post-procedure vital signs: reviewed and stable  Pain management: adequate  Airway patency: patent    PONV status at discharge: No PONV  Anesthetic complications: no      Cardiovascular status: blood pressure returned to baseline  Respiratory status: unassisted  Hydration status: euvolemic  Follow-up not needed.              Vitals Value Taken Time   /78 06/24/25 13:35   Temp 36.4 °C (97.5 °F) 06/24/25 10:15   Pulse 70 06/24/25 13:35   Resp 16 06/24/25 13:35   SpO2 98 % 06/24/25 13:35         Event Time   Out of Recovery 10:25:00         Pain/Nadir Score: Pain Rating Prior to Med Admin: 2 (6/24/2025 10:01 AM)  Pain Rating Post Med Admin: 5 (6/24/2025  6:34 AM)  Nadir Score: 10 (6/24/2025 10:10 AM)  Modified Nadir Score: 20 (6/24/2025  1:40 PM)           No

## 2025-06-24 NOTE — H&P
Past Medical History:   Diagnosis Date    Arthritis      Elevated PSA      Essential hypertension, benign      Mixed hyperlipidemia      Prostatitis, unspecified      Wears glasses                 Past Surgical History:   Procedure Laterality Date    cataracts        COLONOSCOPY N/A 8/14/2019     Procedure: COLONOSCOPY;  Surgeon: Dominick Sr MD;  Location: [facility];  Service: Endoscopy;  Laterality: N/A;    COLONOSCOPY N/A 8/2/2023     Procedure: COLONOSCOPY;  Surgeon: Dominick Sr MD;  Location: [facility];  Service: Endoscopy;  Laterality: N/A;    HEMORRHOID SURGERY        KNEE ARTHROSCOPY W/ MENISCAL REPAIR Left      TONSILLECTOMY                  Current Outpatient Medications   Medication Sig    alfuzosin (UROXATRAL) 10 mg Tb24 TAKE 1 TABLET (10 MG TOTAL) BY MOUTH DAILY WITH BREAKFAST.    amlodipine-benazepril 5-20 mg (LOTREL) 5-20 mg per capsule TAKE 1 CAPSULE BY MOUTH EVERY DAY    aspirin (ECOTRIN) 81 MG EC tablet Take 81 mg by mouth once daily.    HYDROcodone-acetaminophen (NORCO) 7.5-325 mg per tablet 1 tablet PO TID prn severe pain    simvastatin (ZOCOR) 20 MG tablet TAKE 1 TABLET BY MOUTH EVERY DAY IN THE EVENING    tadalafiL (CIALIS) 5 MG tablet Take 1 tablet (5 mg total) by mouth once daily. for 21 days    traZODone (DESYREL) 100 MG tablet TAKE 1 TABLET BY MOUTH NIGHTLY AS NEEDED FOR INSOMNIA.      No current facility-administered medications for this visit.               Review of patient's allergies indicates:   Allergen Reactions    Aminoglycosides Other (See Comments)       Lips swelled    Meperidine Itching    Penicillins Itching                Family History   Problem Relation Name Age of Onset    Cancer Brother             lunga cancer    Cancer Paternal Uncle             prostate cancer    Melanoma Neg Hx        Psoriasis Neg Hx        Lupus Neg Hx        Eczema Neg Hx             [Social History]    [Social History]        Socioeconomic History    Marital status:    Tobacco  Use    Smoking status: Former       Current packs/day: 0.00       Average packs/day: 1 pack/day for 40.0 years (40.0 ttl pk-yrs)       Types: Cigarettes       Start date: 1967       Quit date: 2007       Years since quittin.7       Passive exposure: Past    Smokeless tobacco: Never   Substance and Sexual Activity    Alcohol use: Yes       Alcohol/week: 5.0 standard drinks of alcohol       Types: 6 Standard drinks or equivalent per week       Comment: OCCASSIONALLY    Drug use: No    Sexual activity: Not Currently      Social Drivers of Health           Financial Resource Strain: Low Risk  (2023)     Overall Financial Resource Strain (CARDIA)      Difficulty of Paying Living Expenses: Not hard at all   Recent Concern: Financial Resource Strain - Medium Risk (10/6/2023)     Overall Financial Resource Strain (CARDIA)      Difficulty of Paying Living Expenses: Somewhat hard   Food Insecurity: No Food Insecurity (2023)     Hunger Vital Sign      Worried About Running Out of Food in the Last Year: Never true      Ran Out of Food in the Last Year: Never true   Transportation Needs: No Transportation Needs (2023)     PRAPARE - Transportation      Lack of Transportation (Medical): No      Lack of Transportation (Non-Medical): No   Physical Activity: Insufficiently Active (2023)     Exercise Vital Sign      Days of Exercise per Week: 1 day      Minutes of Exercise per Session: 20 min   Stress: No Stress Concern Present (2023)     [employer] - Occupational Stress Questionnaire      Feeling of Stress : Only a little   Housing Stability: Low Risk  (2023)     Housing Stability Vital Sign      Unable to Pay for Housing in the Last Year: No      Number of Places Lived in the Last Year: 1      Unstable Housing in the Last Year: No        Chief Complaint: No chief complaint on file.        History of present illness:  85-year-old male seen for worsening  bilateral knee pain.  Left greater than right.  Patient has had pain for years that is just progressing.  Starting to affect his quality of life.  He is not able to do yard work or even walk for any significant distance without significant pain.  He has tried cortisone injections previously.  Rates the pain currently as a 7/10.     Prior treatment:  Cortisone injections and NSAIDs           Review of Systems:     Constitution: Negative for chills, fever, and sweats.  Negative for unexplained weight loss.     HENT:  Negative for headaches and blurry vision.     Cardiovascular:Negative for chest pain or irregular heart beat. Negative for hypertension.     Respiratory:  Negative for cough and shortness of breath.     Gastrointestinal: Negative for abdominal pain, heartburn, melena, nausea, and vomitting.     Genitourinary:  Negative bladder incontinence and dysuria.     Musculoskeletal:  See HPI     Neurological: Negative for numbness.     Psychiatric/Behavioral: Negative for depression.  The patient is not nervous/anxious.       Endocrine: Negative for polyuria     Hematologic/Lymphatic: Negative for bleeding problem.  Does not bruise/bleed easily.     Skin: Negative for poor would healing and rash        Physical Examination:     Vital Signs:  There were no vitals filed for this visit.     There is no height or weight on file to calculate BMI.     This a well-developed, well nourished patient in no acute distress.  They are alert and oriented and cooperative to examination.  Pt. walks without an antalgic gait.       Examination of the left knee shows no rashes or erythema. There are no masses ecchymosis or effusion. Patient has full range of motion from 5-130°. Patient is nontender to palpation over lateral joint line and moderately tender to palpation over the medial joint line. Patient has a - Lachman exam, - anterior drawer exam, and - posterior drawer exam. - Apley exam. Knee is stable to varus and valgus stress.  5 out of 5 motor strength. Palpable distal pulses. Intact light touch sensation. Negative Patellofemoral crepitus        Heart is regular rate without obvious murmurs   Normal respiratory effort without audible wheezing  Abdomen is soft and nontender      X-rays:  X-rays of both knees are ordered and review which show severe medial compartment narrowing with varus knee arthritis           Assessment:  Severe bilateral varus knee osteoarthritis     Plan:  I reviewed the findings with her today.  We talked in great detail about total knee arthroplasty.  Patient would like to go ahead and schedule that.  We will get him cleared by his primary care provider.  Plan is for left robotic assisted total knee arthroplasty with Maksim triathlon implants.  Risks, benefits, and alternatives to the procedure were explained to the patient including but not limited to damage to nerves, arteries, blood vessels, bones, tendons, ligaments, stiffness, instability, infection, permanent limb dysfunction, DVT, PE, as well as general anesthetic complications including seizure, stroke, heart attack and even death. The patient understood these risks and wished to proceed and signed the informed consent.         The mobility limitation can not be sufficiently resolved by the use of a cane.  Patient's functional mobility deficit can be sufficiently resolved with the use of a rolling walker or a walker.  Patient has mobility limitation significantly impairs their ability to participate in 1 or more activities of daily living.  The use of a walker or rolling walker we will significantly improve the patient's ability to participate in activities of daily living and the patient will use it on a regular basis in the home.                    All previous pertinent notes including ER visits, physical therapy visits, other orthopedic visits as well as other care for the same musculoskeletal problem were reviewed.  All pertinent lab values and  previous imaging was reviewed pertinent to the current visit.     This note was created using [employer] voice recognition software that occasionally misinterpreted phrases or words.     Consult note is delivered via Epic messaging service.

## 2025-06-24 NOTE — DISCHARGE SUMMARY
[facility]  Discharge Note  Short Stay    Procedure(s) (LRB):  ROBOTIC ARTHROPLASTY, KNEE, TOTAL (Left)      OUTCOME: Patient tolerated treatment/procedure well without complication and is now ready for discharge.    DISPOSITION: Home or Self Care    FINAL DIAGNOSIS:  Left knee osteoarthritis    FOLLOWUP: In clinic    DISCHARGE INSTRUCTIONS:    Discharge Procedure Orders   Diet general     Leave dressing on - Keep it clean, dry, and intact until clinic visit     Change dressing (specify)   Order Comments: Dressing change: If dressing loses its seal, change daily.     Call MD for:  temperature >100.4     Call MD for:  persistent nausea and vomiting     Call MD for:  severe uncontrolled pain     Call MD for:  difficulty breathing, headache or visual disturbances     Call MD for:  redness, tenderness, or signs of infection (pain, swelling, redness, odor or green/yellow discharge around incision site)     Call MD for:  hives     Call MD for:  persistent dizziness or light-headedness     Call MD for:  extreme fatigue     Keep surgical extremity elevated     Ice to affected area   Order Comments: using barrier between ice and skin (specify duration&frequency)     Weight bearing as tolerated     Activity as tolerated     Shower on day dressing removed (No bath)        TIME SPENT ON DISCHARGE: 5 minutes

## 2025-06-24 NOTE — ANESTHESIA PROCEDURE NOTES
Spinal    Diagnosis: knee pain left  Patient location during procedure: OR  Start time: 6/24/2025 7:40 AM  Timeout: 6/24/2025 7:39 AM  End time: 6/24/2025 7:45 AM    Staffing  Authorizing Provider: Uziel Fulton MD  Performing Provider: Uziel Fulton MD    Staffing  Performed by: Uziel Fulton MD  Authorized by: Uziel Fulton MD    Preanesthetic Checklist  Completed: patient identified, IV checked, site marked, risks and benefits discussed, surgical consent, monitors and equipment checked, pre-op evaluation and timeout performed  Spinal Block  Patient position: sitting  Prep: ChloraPrep  Patient monitoring: cardiac monitor, continuous pulse ox and continuous capnometry  Approach: midline  Location: L3-4  Injection technique: single shot  CSF Fluid: clear free-flowing CSF  Needle  Needle type: pencil-tip   Needle gauge: 25 G  Needle length: 3.5 in  Needle localization: anatomical landmarks  Assessment  Ease of block: moderate  Medications:    Medications: bupivacaine (pf) (MARCAINE) injection 0.75% - Intraspinal   1.3 mL - 6/24/2025 7:40:00 AM

## 2025-06-24 NOTE — TRANSFER OF CARE
"Anesthesia Transfer of Care Note    Patient: Tha Guzman    Procedure(s) Performed: Procedure(s) (LRB):  ROBOTIC ARTHROPLASTY, KNEE, TOTAL (Left)    Patient location: PACU    Anesthesia Type: spinal    Transport from OR: Transported from OR on 2-3 L/min O2 by NC with adequate spontaneous ventilation    Post pain: adequate analgesia    Post assessment: no apparent anesthetic complications    Post vital signs: stable    Level of consciousness: awake    Nausea/Vomiting: no nausea/vomiting    Complications: none    Transfer of care protocol was followed      Last vitals: Visit Vitals  /60   Pulse 78   Temp 36.8 °C (98.2 °F) (Temporal)   Resp (!) 9   Ht 5' 3" (1.6 m)   Wt 68 kg (150 lb)   SpO2 (!) 93%   BMI 26.57 kg/m²     "

## 2025-06-24 NOTE — OP NOTE
[facility]  Orthopedic Surgery  Operative Note    SUMMARY     Date of Procedure: 6/24/2025     Procedure: Procedure(s) (LRB):  ROBOTIC ARTHROPLASTY, KNEE, TOTAL (Left)       Surgeons and Role:     * Jose Guadalupe Cardoza MD - Primary    Assistant: Pankaj ALVAREZ    Pre-Operative Diagnosis: Primary osteoarthritis of left knee [M17.12]  Pre-op testing [Z01.818]    Post-Operative Diagnosis: Post-Op Diagnosis Codes:     * Primary osteoarthritis of left knee [M17.12]     * Pre-op testing [Z01.818]    Anesthesia: Spinal    Complications: No    Estimated Blood Loss (EBL): 30ml           Implants:   Implant Name Type Inv. Item Serial No.  Lot No. LRB No. Used Action   CEMENT BONE ANTIBIO SIMPLEX P - [id number]  CEMENT BONE ANTIBIO SIMPLEX P  TOM Elastic[employer] TIN. [id number] Left 1 Implanted   COMPONENT FEM CR TRI SZ3 L - [id number]  COMPONENT FEM CR TRI SZ3 L  TOM SALES TIN. [id number] Left 1 Implanted   PATELLA TRIATHLON 31X9 SYMTRC - [id number]  PATELLA TRIATHLON 31X9 SYMTRC  TOM SALES TIN. [id number] Left 1 Implanted   BASEPLATE TIB SANDEEP PRIM SZ 4 - [id number]  BASEPLATE TIB SANDEEP PRIM SZ 4  TOM SALES TIN. [id number] Left 1 Implanted   INSERT TRIATHLON CS TIB 9MM 4 - [id number]  INSERT TRIATHLON CS TIB 9MM 4  TOM SALES ITN. [id number] Left 1 Implanted       Tourniquet time: 51min at 300mmHg    Specimens:   Specimen (24h ago, onward)      None                    Condition: Good    Disposition: PACU - hemodynamically stable.    Attestation: I was present and scrubbed for the entire procedure.    INDICATIONS FOR THE PROCEDURE: A 85M with a history of chronic   Left knee arthritis, had failed all conservative measures including cortisone   injections, PT, viscosupplementation and activity modification. After a long   discussion, the patient wished to proceed with the procedure above.     PROCEDURE IN DETAIL: Risks, benefits and alternatives of the procedure were   explained to the  patient including, but not limited to damage to nerves,   arteries or blood vessels. Also explained risk of infection, stiffness, DVT, PE,   polyethylene wear as well as anesthetic complications including seizure, stroke,   heart attack and death, understood this and signed informed consent. The   patient's Left knee was marked prior to coming to the Operating Room. The patient was brought to the operating room, placed on the operating table in a supine position.A  formal timeout was done in which correct patient, procedure and op site were all   correctly identified and confirmed by the entire operating team.  2gm Ancef was given prior to surgical incision. Spinal anesthesia was induced. The patient'sLeft  lower extremity was prepped and   draped in normal sterile fashion. TheLeft  leg was exsanguinated with an   Esmarch. Tourniquet was inflated up 300 mmHg. Standard anterior approach to   the knee was made. Medial parapatellar arthrotomy was made, leaving about 1/8   inch of tissue for later repair. Proximal medial tibial release was performed,   making sure not to release any of the MCL. We then   everted the patella and reflexed the knee. Fat pad was excised as well as some   of the ACL. Soft tissue off the distal anterior femur was removed for   visualization, so as to help prevent notching.  We started off by placing our femoral pins.  Two pins were placed about 2 centimeters proximal and 1 centimeter anterior to the medial epicondyle.  We then retracted down to the level of the tibial tubercle.    Two pins were placed just short of bicortically in the tibia.  We then hooked up our arays to our pins.  We placed 2 checkpoints.  One in the tibia and 1 in the femur.  We then took the leg through range of motion.  We then began by marking off our bony points.  We did this 1st on the femur and then on the tibia.  After this we did ligament balancing of the knee 1st extension and then in flexion by using some spoons  and an osteotome.  We then adjusted our bone cuts on the computer and once we liked our plan began by making our cuts.  The Digital Lifeboat robotic assisted cutting arm was then brought in and then we made our femoral cuts and then turned our attention to the tibia.  Tibial cuts were made.  All of bone was removed as well as excess soft tissue.  Posterior osteophytes removed as well.  We then began to trial.  We placed a size 3 femur and a size 4 tibia with a size 9 polyethylene.  We were within 1 millimeter between our medial and lateral compartments in both flexion and extension.        We turned our attention to patella, caliper was used on the patella where it   measured 22. We set guide at 13 and made our 9-mm cut for polyethylene component, 31 was selected. Then drill holes were placed and the patellar button was placed.   This had good tracking. No need for a lateral release and with no hand tests,   it stayed centered the whole time. We then marked our tibial rotation. We then drilled our femoral lugs.  We then   removed everything except the size 4 tibial tray. We then checked our tibial tray   with a drop jake again and then marked our rotation and pinned it into place then   drilled, and then punched for our keel.  Patient has a little bit of the medial defect that we did not cut with sclerotic bone.  Drill bit was used to perforate the cortical bone for cement interdigitation.  Robot and pins were all removed. We then started preparing final   components on the back table. Anterior, medial and lateral structures were injected with our local   Cocktail. Bony surfaces   copiously irrigated with Pulsavac and then thoroughly dried. Once the cement   was the appropriate consistency, first the tibia and then the femur were   cemented into place, removing excess cement. A 9 trial was placed. The knee   was held in compression and then the patella was placed. Cement was allowed to   cure. Once the cement was cured, we then  removed excess cement. Again trialed   one final time, again seeing a 9. We then tapped into place the   final 9 meniscal bearing into place. After this was done, we then did our   diluted iodine soak for 3 minutes and then proceeded with closing.  Arthrotomy was closed using our StrataFix.   Subcutaneous tissue was closed using 2-0 Vicryl and skin was using a running 3-0   StrataFix and Dermabond.Instrument, sponge, and needle counts were correct prior to wound closure and at the conclusion of the case.  Sterile dressing was applied.   They were extubated, awakened and transferred from the Operating Room to the   Recovery Room in stable condition.

## 2025-06-24 NOTE — ANESTHESIA PROCEDURE NOTES
Peripheral Block    Patient location during procedure: pre-op   Block not for primary anesthetic.  Reason for block: at surgeon's request and post-op pain management   Post-op Pain Location: left knee   Start time: 6/24/2025 7:10 AM  Timeout: 6/24/2025 7:09 AM   End time: 6/24/2025 7:15 AM    Staffing  Authorizing Provider: Uziel Fulton MD  Performing Provider: Uziel Fulton MD    Staffing  Performed by: Uziel Fulton MD  Authorized by: Uziel Fulton MD    Preanesthetic Checklist  Completed: patient identified, IV checked, site marked, risks and benefits discussed, surgical consent, monitors and equipment checked, pre-op evaluation and timeout performed  Peripheral Block  Patient position: supine  Prep: ChloraPrep  Patient monitoring: heart rate, cardiac monitor, continuous pulse ox, continuous capnometry and frequent blood pressure checks  Block type: adductor canal  Laterality: left  Injection technique: single shot  Needle  Needle type: Stimuplex   Needle gauge: 21 G  Needle length: 4 in  Needle localization: anatomical landmarks and ultrasound guidance   -ultrasound image captured on disc.  Assessment  Injection assessment: negative aspiration, negative parasthesia and local visualized surrounding nerve  Paresthesia pain: none  Heart rate change: no  Slow fractionated injection: yes    Medications:    Medications: BUPivacaine liposome (PF) 1.3 % (13.3 mg/mL) suspension - Injection   20 mL - 6/24/2025 7:10:00 AM  bupivacaine (pf) (MARCAINE) injection 0.5% - Perineural   10 mL - 6/24/2025 7:10:00 AM    Additional Notes  VSS.  [facility] RN monitoring vitals throughout procedure.  Patient tolerated procedure well.

## 2025-06-24 NOTE — PLAN OF CARE
PT at bedside.      1118--Patient up to chair from stretcher per PT.      1120--Patient ambulating in hallway per PT.      1132--Patient cleared for discharge per PT.      1315--Patient still due to void. Patient denies urge to void. Patient has been up, ambulating with stand-by assistance using rolling walker, and patient attempted to void, but is unable to void. Bladder scan done, bladder scan shows 600-800 mL. Dr. Be notified. Per Dr. Be, in & out cath patient, then OK to discharge patient--educate patient to go to the ER if unable to void within 4-6 hours, or if patient feels the urge to void before then but is unable to. Updated and educated patient and patient's son. Patient and patient's son agreeable to plan and verbalized understanding.      1330--In & out cath performed per MD order using sterile technique with assistance from CONCETTA Theodore. 800 mL clear yellow urine noted. Patient tolerated well and reports relief. Patient verbalizes readiness for discharge.

## 2025-06-24 NOTE — ANESTHESIA PREPROCEDURE EVALUATION
06/24/2025  Tha Guzman is a 85 y.o., male.      Pre-op Assessment    I have reviewed the Patient Summary Reports.    I have reviewed the NPO Status.   I have reviewed the Medications.     Review of Systems  Anesthesia Hx:  No problems with previous Anesthesia                Social:  Former Smoker       Cardiovascular:     Hypertension           hyperlipidemia                         Hypertension         Pulmonary:  Pulmonary Normal                       Renal/:    BPH              Hepatic/GI:        BPH             Musculoskeletal:  Arthritis        Arthritis          Neurological:  Neurology Normal              Arthritis                               Physical Exam  General: Well nourished    Airway:  Mallampati: II   Mouth Opening: Normal  TM Distance: Normal  Neck ROM: Normal ROM        Anesthesia Plan  Type of Anesthesia, risks & benefits discussed:    Anesthesia Type: Spinal  Intra-op Monitoring Plan: Standard ASA Monitors  Post Op Pain Control Plan: multimodal analgesia and peripheral nerve block  Informed Consent: Informed consent signed with the Patient and all parties understand the risks and agree with anesthesia plan.  All questions answered.   ASA Score: 2    Ready For Surgery From Anesthesia Perspective.     .

## 2025-06-25 VITALS
TEMPERATURE: 98 F | DIASTOLIC BLOOD PRESSURE: 78 MMHG | BODY MASS INDEX: 26.58 KG/M2 | HEART RATE: 70 BPM | SYSTOLIC BLOOD PRESSURE: 140 MMHG | OXYGEN SATURATION: 98 % | WEIGHT: 150 LBS | RESPIRATION RATE: 16 BRPM | HEIGHT: 63 IN

## 2025-06-25 PROCEDURE — G0180 MD CERTIFICATION HHA PATIENT: HCPCS | Mod: ,,, | Performed by: ORTHOPAEDIC SURGERY

## 2025-07-02 DIAGNOSIS — I10 ESSENTIAL HYPERTENSION: ICD-10-CM

## 2025-07-02 DIAGNOSIS — G47.00 INSOMNIA, UNSPECIFIED TYPE: ICD-10-CM

## 2025-07-02 DIAGNOSIS — E78.5 HYPERLIPIDEMIA, UNSPECIFIED HYPERLIPIDEMIA TYPE: ICD-10-CM

## 2025-07-02 RX ORDER — SIMVASTATIN 20 MG/1
20 TABLET, FILM COATED ORAL NIGHTLY
Qty: 90 TABLET | Refills: 1 | Status: SHIPPED | OUTPATIENT
Start: 2025-07-02

## 2025-07-02 RX ORDER — TRAZODONE HYDROCHLORIDE 100 MG/1
100 TABLET ORAL NIGHTLY
Qty: 90 TABLET | Refills: 1 | Status: SHIPPED | OUTPATIENT
Start: 2025-07-02

## 2025-07-02 RX ORDER — AMLODIPINE AND BENAZEPRIL HYDROCHLORIDE 5; 20 MG/1; MG/1
1 CAPSULE ORAL
Qty: 90 CAPSULE | Refills: 3 | Status: SHIPPED | OUTPATIENT
Start: 2025-07-02

## 2025-07-02 NOTE — TELEPHONE ENCOUNTER
No care due was identified.  [facility] Embedded Care Due Messages. Reference number: [id number].   7/02/2025 12:04:03 AM CDT

## 2025-07-02 NOTE — TELEPHONE ENCOUNTER
Refill Routing Note   Medication(s) are not appropriate for processing by [facility] for the following reason(s):        Required vitals abnormal    [facility] action(s):  Approve  Defer             Appointments  past 12m or future 3m with PCP    Date Provider   Last Visit   1/17/2025 Bart Burrows MD   Next Visit   7/25/2025 Bart Burrows MD   ED visits in past 90 days: 0        Note composed:12:43 AM 07/02/2025

## 2025-07-08 ENCOUNTER — EXTERNAL HOME HEALTH (OUTPATIENT)
Dept: HOME HEALTH SERVICES | Facility: HOSPITAL | Age: 85
End: 2025-07-08
Payer: MEDICARE

## 2025-07-08 DIAGNOSIS — M17.12 PRIMARY OSTEOARTHRITIS OF LEFT KNEE: Primary | ICD-10-CM

## 2025-07-14 ENCOUNTER — HOSPITAL ENCOUNTER (OUTPATIENT)
Dept: RADIOLOGY | Facility: HOSPITAL | Age: 85
Discharge: HOME | End: 2025-07-14
Attending: ORTHOPAEDIC SURGERY
Payer: MEDICARE

## 2025-07-14 ENCOUNTER — OFFICE VISIT (OUTPATIENT)
Dept: ORTHOPEDICS | Facility: CLINIC | Age: 85
End: 2025-07-14
Payer: MEDICARE

## 2025-07-14 VITALS — WEIGHT: 149.94 LBS | BODY MASS INDEX: 26.57 KG/M2 | RESPIRATION RATE: 18 BRPM | HEIGHT: 63 IN

## 2025-07-14 DIAGNOSIS — M17.12 PRIMARY OSTEOARTHRITIS OF LEFT KNEE: ICD-10-CM

## 2025-07-14 DIAGNOSIS — M17.12 PRIMARY OSTEOARTHRITIS OF LEFT KNEE: Primary | ICD-10-CM

## 2025-07-14 DIAGNOSIS — Z96.652 STATUS POST TOTAL LEFT KNEE REPLACEMENT: Primary | ICD-10-CM

## 2025-07-14 PROCEDURE — 1125F AMNT PAIN NOTED PAIN PRSNT: CPT | Mod: CPTII,S$GLB,, | Performed by: ORTHOPAEDIC SURGERY

## 2025-07-14 PROCEDURE — 99024 POSTOP FOLLOW-UP VISIT: CPT | Mod: S$GLB,,, | Performed by: ORTHOPAEDIC SURGERY

## 2025-07-14 PROCEDURE — 3288F FALL RISK ASSESSMENT DOCD: CPT | Mod: CPTII,S$GLB,, | Performed by: ORTHOPAEDIC SURGERY

## 2025-07-14 PROCEDURE — 1159F MED LIST DOCD IN RCRD: CPT | Mod: CPTII,S$GLB,, | Performed by: ORTHOPAEDIC SURGERY

## 2025-07-14 PROCEDURE — 73560 X-RAY EXAM OF KNEE 1 OR 2: CPT | Mod: 26,LT,, | Performed by: RADIOLOGY

## 2025-07-14 PROCEDURE — 99999 PR PBB SHADOW E&M-EST. PATIENT-LVL III: CPT | Mod: PBBFAC,,, | Performed by: ORTHOPAEDIC SURGERY

## 2025-07-14 PROCEDURE — 73560 X-RAY EXAM OF KNEE 1 OR 2: CPT | Mod: TC,PO,LT

## 2025-07-14 PROCEDURE — 1160F RVW MEDS BY RX/DR IN RCRD: CPT | Mod: CPTII,S$GLB,, | Performed by: ORTHOPAEDIC SURGERY

## 2025-07-14 PROCEDURE — 1101F PT FALLS ASSESS-DOCD LE1/YR: CPT | Mod: CPTII,S$GLB,, | Performed by: ORTHOPAEDIC SURGERY

## 2025-07-14 NOTE — PROGRESS NOTES
Past Medical History:   Diagnosis Date    Arthritis     Elevated PSA     Essential hypertension, benign     Mixed hyperlipidemia     Prostatitis, unspecified     Wears glasses        Past Surgical History:   Procedure Laterality Date    cataracts      COLONOSCOPY N/A 8/14/2019    Procedure: COLONOSCOPY;  Surgeon: Dominick Sr MD;  Location: [facility];  Service: Endoscopy;  Laterality: N/A;    COLONOSCOPY N/A 8/2/2023    Procedure: COLONOSCOPY;  Surgeon: Dominick Sr MD;  Location: [facility];  Service: Endoscopy;  Laterality: N/A;    HEMORRHOID SURGERY      KNEE ARTHROSCOPY W/ MENISCAL REPAIR Left     ROBOTIC ARTHROPLASTY, KNEE Left 6/24/2025    Procedure: ROBOTIC ARTHROPLASTY, KNEE, TOTAL;  Surgeon: Jose Guadalupe Cardoza MD;  Location: [facility];  Service: Orthopedics;  Laterality: Left;  brian    TONSILLECTOMY         Current Outpatient Medications   Medication Sig    acetaminophen (TYLENOL) 500 MG tablet Take 2 tablets (1,000 mg total) by mouth every 8 (eight) hours as needed for Pain.    alfuzosin (UROXATRAL) 10 mg Tb24 TAKE 1 TABLET (10 MG TOTAL) BY MOUTH DAILY WITH BREAKFAST.    amlodipine-benazepril 5-20 mg (LOTREL) 5-20 mg per capsule TAKE 1 CAPSULE BY MOUTH EVERY DAY    aspirin (ECOTRIN) 81 MG EC tablet Take 81 mg by mouth once daily.    aspirin (ECOTRIN) 81 MG EC tablet Take 1 tablet (81 mg total) by mouth 2 (two) times daily.    HYDROcodone-acetaminophen (NORCO) 7.5-325 mg per tablet 1 tablet PO TID prn severe pain    ibuprofen (ADVIL,MOTRIN) 600 MG tablet Take 1 tablet (600 mg total) by mouth every 6 (six) hours as needed for Pain.    ondansetron (ZOFRAN) 4 MG tablet Take 1 tablet (4 mg total) by mouth every 6 (six) hours as needed for Nausea.    oxyCODONE-acetaminophen (PERCOCET) 5-325 mg per tablet Take 1 tablet by mouth every 6 (six) hours as needed for Pain.    simvastatin (ZOCOR) 20 MG tablet TAKE 1 TABLET BY MOUTH EVERY DAY IN THE EVENING    tadalafiL (CIALIS) 5 MG tablet Take 1  tablet (5 mg total) by mouth once daily. for 21 days    traZODone (DESYREL) 100 MG tablet TAKE 1 TABLET BY MOUTH NIGHTLY AS NEEDED FOR INSOMNIA.     No current facility-administered medications for this visit.       Review of patient's allergies indicates:   Allergen Reactions    Aminoglycosides Other (See Comments)     Lips swelled    Meperidine Itching    Penicillins Itching       Family History   Problem Relation Name Age of Onset    Cancer Brother          lunga cancer    Cancer Paternal Uncle          prostate cancer    Melanoma Neg Hx      Psoriasis Neg Hx      Lupus Neg Hx      Eczema Neg Hx         Social History[1]    Chief Complaint:   Chief Complaint   Patient presents with    Post-op Evaluation     S/p left TKA. Dos 6/24/25       Date of surgery:  June 24, 2025    History of present illness:  85-year-old male underwent left total knee arthroplasty.  Patient is doing very well.  Pain is a 2/10.  Eager to start outpatient therapy      Review of Systems:    Musculoskeletal:  See HPI        Physical Examination:    Vital Signs:    Vitals:    07/14/25 1342   Resp: 18       Body mass index is 26.56 kg/m².    This a well-developed, well nourished patient in no acute distress.  They are alert and oriented and cooperative to examination.  Pt. walks without an antalgic gait.      Examination left knee shows well-healing surgical incision.  No erythema drainage.  Patient has a little bit of swelling.  Range of motion is about 3° to 95°.    X-rays:  Two views left knee are ordered and review which show well-aligned total knee arthroplasty components without complication     Assessment::  Status post left total knee arthroplasty    Plan:  I reviewed the x-rays with him today.  We will start some outpatient physical therapy.  Patient may get the incision wet.  I will see him back in a month.    This note was created using [employer] voice recognition software that occasionally misinterpreted phrases or words.            [1]   Social History  Socioeconomic History    Marital status:    Tobacco Use    Smoking status: Former     Current packs/day: 0.00     Average packs/day: 1 pack/day for 40.0 years (40.0 ttl pk-yrs)     Types: Cigarettes     Start date: 1967     Quit date: 2007     Years since quittin.0     Passive exposure: Past    Smokeless tobacco: Never   Substance and Sexual Activity    Alcohol use: Yes     Alcohol/week: 5.0 standard drinks of alcohol     Types: 6 Standard drinks or equivalent per week     Comment: OCCASSIONALLY    Drug use: No    Sexual activity: Not Currently     Social Drivers of Health     Financial Resource Strain: Low Risk  (2025)    Overall Financial Resource Strain (CARDIA)     Difficulty of Paying Living Expenses: Not hard at all   Food Insecurity: No Food Insecurity (2025)    Hunger Vital Sign     Worried About Running Out of Food in the Last Year: Never true     Ran Out of Food in the Last Year: Never true   Transportation Needs: No Transportation Needs (2025)    PRAPARE - Transportation     Lack of Transportation (Medical): No     Lack of Transportation (Non-Medical): No   Physical Activity: Inactive (2025)    Exercise Vital Sign     Days of Exercise per Week: 0 days     Minutes of Exercise per Session: 0 min   Stress: No Stress Concern Present (2025)    [employer] - Occupational Stress Questionnaire     Feeling of Stress : Only a little   Housing Stability: Low Risk  (2025)    Housing Stability Vital Sign     Unable to Pay for Housing in the Last Year: No     Number of Times Moved in the Last Year: 0     Homeless in the Last Year: No

## 2025-07-21 ENCOUNTER — CLINICAL SUPPORT (OUTPATIENT)
Dept: REHABILITATION | Facility: HOSPITAL | Age: 85
End: 2025-07-21
Payer: MEDICARE

## 2025-07-21 DIAGNOSIS — Z74.09 IMPAIRED FUNCTIONAL MOBILITY AND ACTIVITY TOLERANCE: ICD-10-CM

## 2025-07-21 DIAGNOSIS — R29.898 DECREASED STRENGTH OF LOWER EXTREMITY: ICD-10-CM

## 2025-07-21 DIAGNOSIS — M17.12 PRIMARY OSTEOARTHRITIS OF LEFT KNEE: ICD-10-CM

## 2025-07-21 DIAGNOSIS — M25.662 DECREASED RANGE OF MOTION OF LEFT KNEE: Primary | ICD-10-CM

## 2025-07-21 PROCEDURE — 97530 THERAPEUTIC ACTIVITIES: CPT | Mod: PN

## 2025-07-21 PROCEDURE — 97110 THERAPEUTIC EXERCISES: CPT | Mod: PN

## 2025-07-21 PROCEDURE — 97161 PT EVAL LOW COMPLEX 20 MIN: CPT | Mod: PN

## 2025-07-21 NOTE — PROGRESS NOTES
"Physical Therapy Evaluation    Patient Name: Tha Guzman  MRN: [id number]  YOB: 1940  Encounter Date: 7/21/2025    Therapy Diagnosis:   Encounter Diagnoses   Name Primary?    Primary osteoarthritis of left knee     Decreased range of motion of left knee Yes    Decreased strength of lower extremity     Impaired functional mobility and activity tolerance      Physician: Jose Guadalupe Cardoza,*    Physician Orders: Eval and Treat  Medical Diagnosis: Primary osteoarthritis of left knee  Surgical Diagnosis: L knee replacement   Surgical Date: 6/24/2025  Days Since Last Surgery: 27    Visit # / Visits Authorized:  1 / 1  Insurance Authorization Period: 7/14/2025 to 7/14/2026  Date of Evaluation: 7/21/2025  Plan of Care Certification: 7/21/2025 to 10/18/2025     Time In: 0705   Time Out: 0800  Total Time (in minutes): 55   Total Billable Time (in minutes): 55    Intake Outcome Measure for FOTO Survey    Therapist reviewed FOTO scores for Tha Guzman on 7/21/2025.   FOTO report - see Media section or FOTO account episode details.     Intake Score (%): 44    Precautions:       Subjective   History of Present Illness  Tha is a 85 y.o. male who reports to physical therapy with a chief concern of s/p L knee replacement.       Patient reports a surgery of L knee replacement. Surgery occurred on 06/24/25.         Dominant Hand: Right  History of Present Condition/Illness: Reports weakness and pain lead to the knee surgery. Reports the pain has gradually improved. Reports he continues to have some swelling. He takes ibuprofen over the counter for pain management. Reports numbness at the lateral L knee joint. Reports he does his daily exercises with the walker but he usually uses the cane. Reports the cane is kind of crutch but he likes to just be safe. Reports he does his exercises twice a day. Reports last day of home health was last Friday. Reports yesterday was the best he has felt "I felt really good " "yesterday"     Activities of Daily Living  Social history was obtained from Patient.    General Prior Level of Function Comments: independent  General Current Level of Function Comments: independent       Previously independent with activities of daily living? Yes     Currently independent with activities of daily living? Yes          Previously independent with instrumental activities of daily living? Yes     Currently independent with instrumental activities of daily living? Yes              Pain     Patient reports a current pain level of 4/10. Pain at best is reported as 2/10. Pain at worst is reported as 5/10.   Location: L knee quad region  Clinical Progression (since onset): Stable  Pain Qualities: Burning, Sharp, Throbbing  Pain-Relieving Factors: Medications - over-the-counter, Ice, Other (Comment)  Other Pain-Relieving Factors: ice twice a day following his exercises, GP- prescribe narco- half of narco when it gets bad about 3x/wk  Pain-Aggravating Factors: Other (Comment), Sitting  Other Pain-Aggravating Factors: stiffness after sitting         Review of Systems  Patient denies: Bladder Incontinence, Bowel Incontinence, Night Pain, and Weight Loss        Treatment History  Treatments  Previously Received Treatments: Yes  Previous Treatments: Physical therapy  Additional Treatment Details: Home health PT    Living Arrangements  Living Situation  Housing: Home independently  Living Arrangements: Alone    Home Setup  Type of Structure: House        Employment  Patient does not report that: Does the patient's condition impact their ability to work?  Employment Status: Retired   Hobbies: read and being outside       Past Medical History/Physical Systems Review:   Tha Guzman  has a past medical history of Arthritis, Elevated PSA, Essential hypertension, benign, Mixed hyperlipidemia, Prostatitis, unspecified, and Wears glasses.    Tha WHALEY Cholojelenadelmisiraida  has a past surgical history that includes Knee arthroscopy " w/ meniscal repair (Left); Tonsillectomy; Hemorrhoid surgery; cataracts; Colonoscopy (N/A, 8/14/2019); Colonoscopy (N/A, 8/2/2023); and robotic arthroplasty, knee (Left, 6/24/2025).    Tha has a current medication list which includes the following prescription(s): acetaminophen, alfuzosin, amlodipine-benazepril 5-20 mg, aspirin, aspirin, hydrocodone-acetaminophen, ibuprofen, ondansetron, simvastatin, and trazodone.    Review of patient's allergies indicates:   Allergen Reactions    Aminoglycosides Other (See Comments)     Lips swelled    Meperidine Itching    Penicillins Itching        Objective   Knee Observations  Left Knee Observations  Present: Effusion, Incision, and Straight Leg Raise Extensor Lag  Left Knee Incision Observations  Present: Clean and Dry  Not Present: Drainage             Knee Swelling  Location of Measurement Right  (cm) Left  (cm)   20 cm Above Joint Line 43 44   10 cm Vastus Medialis Oblique       At Joint Line 39 35   15 cm Below Joint Line 34 34             Knee Range of Motion   Right Knee   Active (deg) Passive (deg) Pain   Flexion 135       Extension 3           Left Knee   Active (deg) Passive (deg) Pain   Flexion 125   Yes   Extension 0                          Hip Strength - Planes of Motion   Right Strength Right Pain Left Strength Left  Pain   Flexion (L2) 5   4     Extension 3-   3-     ABduction 4   4     ADduction 4-   4-     Internal Rotation 4+   4     External Rotation 4   4         Knee Strength   Right Strength Right Pain Left Strength Left  Pain   Flexion (S2)           Prone Flexion 4   4     Extension (L3) 4+   4       Knee Extensor Lag  Lag Present: Left       Ankle/Foot Strength - Planes of Motion   Right Strength Right Pain Left Strength Left  Pain   Dorsiflexion (L4) 5   5     Plantar Flexion (S1)           Inversion           Eversion           Great Toe Flexion           Great Toe Extension (L5)           Lesser Toes Flexion           Lesser Toes Extension                   Transfers Assessment  Sit to Stand Assistance: Independent  Chair to Bed Assistance: Independent  Bed to Chair Assistance: Independent    Bed Mobility Assessment  Rolling Assistance: Independent  Sidelying to Sit Assistance: Independent  Sit to Sidelying Assistance: Independent  Scooting to Edge of Bed Assistance: Independent      Fall Risk  Functional mobility test results suggest the patient is not: At Risk for Falls  Timed Up & Go (TUG)  Time: 11 seconds     An older adult who takes >=12 seconds to complete the TUG is at risk for falling.       Sit to Stand Testing  The patient completed 5 sit to stand transfers in 13 sec. no UE; norms for 80-89 is 14.8 sec             Ambulation Details    Indepdent without AD    Gait Analysis  Base of Support: Normal    Right Foot Contact Pattern: Heel to toe    Left Foot Contact Pattern: Heel to toe         Treatment:  Therapeutic Exercise  TE 1: prone hamstring curls 10 reps  TE 2: 4 ways SLR 10 reps each direction  TE 3: SL ER clams yellow TB 10 reps  TE 4: heel slides with sheet 10 reps  Therapeutic Activity  TA 1: educated on POC and goal  TA 2: standing squats 15 reps  TA 3: sit to stands without UE support 14 reps  TA 4: bridging 10 reps    Time Entry(in minutes):  PT Evaluation (Low) Time Entry: 30  Therapeutic Activity Time Entry: 10  Therapeutic Exercise Time Entry: 15    Assessment & Plan   Assessment  Tha presents with a condition of Low complexity.   Presentation of Symptoms: Stable  Will Comorbidities Impact Care: No       Functional Limitations: Range of motion, Performing household chores, Participating in leisure activities, Painful locomotion/ambulation, Functional mobility  Impairments: Abnormal or restricted range of motion, Impaired physical strength    Patient Goal for Therapy (PT): have stronger legs, be able to go up and down stairs without help or someone holding on to me  Prognosis: Excellent  Assessment Details: Pt presents with s/p L TKA on  6/24/2025. Patient reports functional limitations of impaired stair navigation and decreased activity tolerance with household chores and yard work. Patient demonstrates decreased L knee range of motion, decreased LE strength, and impaired functional mobility. Pt would benefit from skilled PT to address these deficits to maximize functional mobility and return to PLOF.      Plan  From a physical therapy perspective, the patient would benefit from: Skilled Rehab Services    Planned therapy interventions include: Therapeutic exercise, Therapeutic activities, Neuromuscular re-education, Manual therapy, ADLs/IADLs, Aquatic therapy, and Gait training.            Visit Frequency: 2 times Per Week for 2 Weeks.       This plan was discussed with Patient.   Discussion participants: Agreed Upon Plan of Care  Plan details: Taper to 1x/wk for 4 weeks           The patient's spiritual, cultural, and educational needs were considered, and the patient is agreeable to the plan of care and goals.     Education  Education was done with Patient. The patient's learning style includes Listening. The patient Verbalizes understanding.         Patient was educated on HEP       Goals:   Active       Long term Goals       Patient will navigate stair without handrail with independence to improve community mobility        Start:  07/21/25    Expected End:  09/01/25            Patient will perform TUG in </= 9 second to improve gait speed        Start:  07/21/25    Expected End:  09/01/25            - Pt will demonstrate independence with the HEP at discharge. Appropriate and Ongoing        Start:  07/21/25    Expected End:  09/01/25            - Pt will demonstrate reduced pain and improved functional outcomes as reported on the FOTO by reaching an intake score of >/= 63% functional ability in order to demonstrate subjective improvement in patient's condition. .        Start:  07/21/25    Expected End:  09/01/25               Short Term Goals        Patient will perform L knee flexion to 130 deg without pain to improve squatting ability        Start:  07/21/25    Expected End:  08/04/25            Patient will perform SLR without quad lag to improve functional mobility        Start:  07/21/25    Expected End:  08/04/25            Patient will perform 5xSTS in </= 11 second to demonstrate improvement in functional LE strength        Start:  07/21/25    Expected End:  08/04/25            Patient will report >/= 55/100 on FOTO to improve return to PLOF       Start:  07/21/25    Expected End:  08/04/25                Kenyetta Reyes, PT

## 2025-07-25 ENCOUNTER — OFFICE VISIT (OUTPATIENT)
Dept: FAMILY MEDICINE | Facility: CLINIC | Age: 85
End: 2025-07-25
Payer: MEDICARE

## 2025-07-25 VITALS
HEIGHT: 63 IN | OXYGEN SATURATION: 97 % | TEMPERATURE: 98 F | HEART RATE: 92 BPM | RESPIRATION RATE: 16 BRPM | DIASTOLIC BLOOD PRESSURE: 64 MMHG | SYSTOLIC BLOOD PRESSURE: 116 MMHG | WEIGHT: 147.69 LBS | BODY MASS INDEX: 26.17 KG/M2

## 2025-07-25 DIAGNOSIS — I10 PRIMARY HYPERTENSION: ICD-10-CM

## 2025-07-25 DIAGNOSIS — M17.0 PRIMARY OSTEOARTHRITIS OF BOTH KNEES: ICD-10-CM

## 2025-07-25 DIAGNOSIS — E78.2 MIXED HYPERLIPIDEMIA: ICD-10-CM

## 2025-07-25 DIAGNOSIS — Z00.00 HEALTHCARE MAINTENANCE: Primary | ICD-10-CM

## 2025-07-25 DIAGNOSIS — N40.0 BENIGN PROSTATIC HYPERPLASIA, UNSPECIFIED WHETHER LOWER URINARY TRACT SYMPTOMS PRESENT: ICD-10-CM

## 2025-07-25 DIAGNOSIS — M25.569 KNEE PAIN, UNSPECIFIED CHRONICITY, UNSPECIFIED LATERALITY: ICD-10-CM

## 2025-07-25 PROCEDURE — 99999 PR PBB SHADOW E&M-EST. PATIENT-LVL III: CPT | Mod: PBBFAC,,, | Performed by: STUDENT IN AN ORGANIZED HEALTH CARE EDUCATION/TRAINING PROGRAM

## 2025-07-25 RX ORDER — HYDROCODONE BITARTRATE AND ACETAMINOPHEN 7.5; 325 MG/1; MG/1
TABLET ORAL
Qty: 90 TABLET | Refills: 0 | Status: SHIPPED | OUTPATIENT
Start: 2025-07-25

## 2025-07-25 NOTE — PROGRESS NOTES
[facility] Note    Subjective:    The HPI and pertinent ROS is included in the Diagnostic Impression Remarks section at the end of the note. Please see below for further details. Chief complaint is at end of note.     Tha is a pleasant intelligent patient who is here for evaluation.     Modified Medications    Modified Medication Previous Medication    HYDROCODONE-ACETAMINOPHEN (NORCO) 7.5-325 MG PER TABLET HYDROcodone-acetaminophen (NORCO) 7.5-325 mg per tablet       1 tablet PO TID prn severe pain    1 tablet PO TID prn severe pain       Data reviewed 274}  Previous medical records reviewed and summarized in plan section at end of note.      If you are due for any health screening(s) below please notify me so we can arrange them to be ordered and scheduled. Most healthy patients at your age complete them, but you are free to accept or refuse. If you can't do it, I'll definitely understand. If you can, I'd certainly appreciate it!     All of your core healthy metrics are met.      The following portions of the patient's history were reviewed and updated as appropriate: allergies, current medications, past family history, past medical history, past social history, past surgical history and problem list.    He  has a past medical history of Arthritis, Elevated PSA, Essential hypertension, benign, Mixed hyperlipidemia, Prostatitis, unspecified, and Wears glasses.  He  has a past surgical history that includes Knee arthroscopy w/ meniscal repair (Left); Tonsillectomy; Hemorrhoid surgery; cataracts; Colonoscopy (N/A, 8/14/2019); Colonoscopy (N/A, 8/2/2023); and robotic arthroplasty, knee (Left, 6/24/2025).    He  reports that he quit smoking about 18 years ago. His smoking use included cigarettes. He started smoking about 58 years ago. He has a 40 pack-year smoking history. He has been exposed to tobacco smoke. He has never used smokeless tobacco. He reports current alcohol use of about 5.0 standard  "drinks of alcohol per week. He reports that he does not use drugs.  He family history includes Cancer in his brother and paternal uncle.    Review of patient's allergies indicates:   Allergen Reactions    Aminoglycosides Other (See Comments)     Lips swelled    Meperidine Itching    Penicillins Itching       Tobacco Use: Medium Risk (7/25/2025)    Patient History     Smoking Tobacco Use: Former     Smokeless Tobacco Use: Never     Passive Exposure: Past     Physical Examination  General appearance: alert, cooperative, no distress  Neck: no thyromegaly, no neck stiffness  Lungs: clear to auscultation, no wheezes, rales or rhonchi, symmetric air entry  Heart: normal rate, regular rhythm, normal S1, S2, no murmurs, rubs, clicks or gallops  Abdomen: soft, nontender, nondistended, no rigidity, rebound, or guarding.   Back: no point tenderness over spine  Extremities: peripheral pulses normal, no unilateral leg swelling or calf tenderness   Neurological:alert, oriented, normal speech, no new focal findings or movement disorder noted from baseline         BP Readings from Last 3 Encounters:   07/25/25 116/64   07/09/25 117/70   06/24/25 (!) 140/78     Wt Readings from Last 3 Encounters:   07/25/25 67 kg (147 lb 11.3 oz)   07/14/25 68 kg (149 lb 14.6 oz)   06/24/25 68 kg (150 lb)     /64 (BP Location: Right arm, Patient Position: Sitting)   Pulse 92   Temp 98.3 °F (36.8 °C) (Oral)   Resp 16   Ht 5' 3" (1.6 m)   Wt 67 kg (147 lb 11.3 oz)   SpO2 97%   BMI 26.17 kg/m²    274}  Laboratory: I have reviewed old labs below:    274}    Lab Results   Component Value Date    WBC 5.49 05/02/2025    HGB 14.1 05/02/2025    HCT 41.5 05/02/2025    MCV 92 05/02/2025     05/02/2025     05/02/2025    K 4.3 05/02/2025     05/02/2025    CALCIUM 9.5 05/02/2025    CO2 27 05/02/2025    BUN 12 05/02/2025    CREATININE 0.8 05/02/2025    EGFRNORACEVR >60 05/02/2025    LABPROT 10.9 01/29/2025    ALBUMIN 3.8 05/02/2025 "    BILITOT 0.9 05/02/2025    ALKPHOS 49 05/02/2025    ALT 16 05/02/2025    AST 29 05/02/2025    INR 1.0 05/02/2025    CHOL 127 01/17/2025    TRIG 109 01/17/2025    HDL 43 01/17/2025    LDLCALC 62.2 (L) 01/17/2025    TSH 1.731 08/21/2024    PSA 3.69 06/11/2013    HGBA1C 4.8 01/17/2025      Lab reviewed by me: Particular labs of significance that I will monitor, workup, or treat to improve are mentioned below in diagnostic impression remarks.      Imaging/EKG: I have reviewed the pertinent results and my findings are noted in remarks.  274}    CC:   Chief Complaint   Patient presents with    Follow-up    Hypertension        274}    History of Present Illness    CHIEF COMPLAINT:  Patient presents today for follow up after knee surgery    RIGHT KNEE PAIN AND RECENT SURGERY:  He reports recent knee surgery with improving pain. He continues to experience persistent right knee pain and is considering additional surgical intervention. He occasionally uses Norco as needed for pain management, which effectively assists with daily activities including household chores, mobility, and caring for children. He denies taking NSAIDs due to prior side effects. He denies fever or chills.    MEDICAL HISTORY:  He has a history of elevated liver enzymes, which have now improved and is not experiencing significant liver-related symptoms currently. He expresses hesitancy to take additional medications due to prior side effects and history of elevated liver enzymes.      ROS:  Constitutional: -fevers, -chills, +sleep disturbances  Musculoskeletal: +joint pain, +limb pain, +pain with movement  Neurological: +seizures  Psychiatric: +difficulty staying asleep, +difficulty falling asleep          Assessment/Plan  Tha Guzman is a 85 y.o. male who presents to clinic with:  1. Healthcare maintenance    2. Primary osteoarthritis of both knees    3. Primary hypertension    4. Mixed hyperlipidemia    5. Benign prostatic hyperplasia, unspecified  whether lower urinary tract symptoms present    6. Knee pain, unspecified chronicity, unspecified laterality       274}    Assessment & Plan    M17.0 Primary osteoarthritis of both knees  Z00.00 Healthcare maintenance  I10 Primary hypertension  E78.2 Mixed hyperlipidemia  N40.0 Benign prostatic hyperplasia, unspecified whether lower urinary tract symptoms present  M25.569 Knee pain, unspecified chronicity, unspecified laterality    PLAN SUMMARY:  - Continue Norco for right knee pain, as needed  - Maintain current treatment for HLD, HTN, insomnia, and seizures  - Avoid NSAIDs due to patient preference and side effects  - Monitor post-operative status of recent knee surgery    PRIMARY OSTEOARTHRITIS OF BOTH KNEES:  - Assessed post-op status of recent knee surgery: no fever or chills, improved pain.  - Patient sometimes uses Norco for right knee pain relief.  - Considered patient's preference to avoid NSAIDs due to side effects.  - Weighed benefits of pain medication against risks: concluded benefits outweigh risks at this time, as medication enables patient to perform daily activities and family responsibilities.  - Continued Norco for right knee pain, as needed.    HEALTHCARE MAINTENANCE:  - Previously elevated liver enzymes improved.    PRIMARY HYPERTENSION:  - Monitored stable conditions: HTN.    MIXED HYPERLIPIDEMIA:  - Monitored stable conditions: HLD.    KNEE PAIN, UNSPECIFIED CHRONICITY, UNSPECIFIED LATERALITY:  - See management under PRIMARY OSTEOARTHRITIS OF BOTH KNEES.    LIFESTYLE CHANGES:  - Monitored stable conditions: insomnia, seizures.           The patient is meeting the goals of opioid therapy and is dependent on them for functionality and can not perform ADLS without them. Regarding opioids, the risks were discussed including tolerance, addiction, overdose, over-sedation, drug interactions, liver damage, hormone imbalance, dental decay, opioid-induced hyperalgesia, and even death. I cautioned the  patient that the medications that are being taken are dangerous and can result in death from overdose. They can also result in death if taken by someone else without medical supervision.     I discussed with him the proper use of narcotics, including storing the narcotics in a safe and locked place and promptly disposing any unused narcotics once he no longer needs them. We specifically discussed that he is never to give any medication, and especially narcotic pain medication, to another person and they are only for him.  We talked about the risks including respiratory depression and about Naloxone as an antidote for narcotic overdose. We discussed that he is to avoid alcohol, benzodiazapenes, and sedating drugs while taking narcotics.     was reviewed today (07/25/2025) and determined to be appropriate for continued opioid therapy.    The addictive potential of the medications were discussed and he agrees to inform us or the PCP if any compulsion to taking his medications for any reason other than for pain arises. He agrees to safe-guard all medications from unintentional or intentional use or misuse by other persons who may potentially have access to their premises, including but not limited to significant others, children, parents, friends, and even strangers. He takes sole responsibility for any consequences that may occur from not doing so.    This note was generated with the assistance of ambient listening technology. Verbal consent was obtained by the patient and accompanying visitor(s) for the recording of patient appointment to facilitate this note. I attest to having reviewed and edited the generated note for accuracy, though some syntax or spelling errors may persist. Please contact the author of this note for any clarification.       1. Primary osteoarthritis of both knees  - HYDROcodone-acetaminophen (NORCO) 7.5-325 mg per tablet; 1 tablet PO TID prn severe pain  Dispense: 90 tablet; Refill: 0    2.  Healthcare maintenance    3. Primary hypertension    4. Mixed hyperlipidemia    5. Benign prostatic hyperplasia, unspecified whether lower urinary tract symptoms present    6. Knee pain, unspecified chronicity, unspecified laterality      Bart Burrows MD   274}    If you are due for any health screening(s) below please notify me so we can arrange them to be ordered and scheduled. Most healthy patients at your age complete them, but you are free to accept or refuse.     If you can't do it, I'll definitely understand. If you can, I'd certainly appreciate it!   All of your core healthy metrics are met.

## 2025-07-29 ENCOUNTER — CLINICAL SUPPORT (OUTPATIENT)
Dept: REHABILITATION | Facility: HOSPITAL | Age: 85
End: 2025-07-29
Payer: MEDICARE

## 2025-07-29 DIAGNOSIS — M25.662 DECREASED RANGE OF MOTION OF LEFT KNEE: Primary | ICD-10-CM

## 2025-07-29 DIAGNOSIS — R29.898 DECREASED STRENGTH OF LOWER EXTREMITY: ICD-10-CM

## 2025-07-29 DIAGNOSIS — Z74.09 IMPAIRED FUNCTIONAL MOBILITY AND ACTIVITY TOLERANCE: ICD-10-CM

## 2025-07-29 PROCEDURE — 97110 THERAPEUTIC EXERCISES: CPT | Mod: PN

## 2025-07-29 PROCEDURE — 97112 NEUROMUSCULAR REEDUCATION: CPT | Mod: PN

## 2025-07-29 PROCEDURE — 97530 THERAPEUTIC ACTIVITIES: CPT | Mod: PN

## 2025-07-29 NOTE — PROGRESS NOTES
Physical Therapy Visit    Patient Name: Tha Guzman  MRN: [id number]  YOB: 1940  Encounter Date: 7/29/2025    Therapy Diagnosis:   Encounter Diagnoses   Name Primary?    Decreased range of motion of left knee Yes    Decreased strength of lower extremity     Impaired functional mobility and activity tolerance      Physician: Jose Guadalupe Cardoza,*    Physician Orders: Eval and Treat  Medical Diagnosis: Primary osteoarthritis of left knee  Surgical Diagnosis: L knee replacement   Surgical Date: 6/24/2025  Days Since Last Surgery: 35    Visit # / Visits Authorized:  1 / 10  Insurance Authorization Period: 7/16/2025 to 10/10/2025  Date of Evaluation: 7/21/2025  Plan of Care Certification: 7/21/2025 to 10/18/2025      PT/PTA: PT   Number of PTA visits since last PT visit:0  Time In: 0705   Time Out: 0803  Total Time (in minutes): 58   Total Billable Time (in minutes): 55    FOTO:  Intake Score (%): 44  Survey Score 2 (%): Not applicable for this Episode  Survey Score 3 (%): Not applicable for this Episode    Precautions:         Subjective   Patient has questions regarding the HEP. He reports quad soreness following evaluation. He was compliant with HEP. Reports no pain upon arrival just stiffness..  Pain reported as 0/10. L knee    Objective       Knee Range of Motion   Left Knee   Active (deg) Passive (deg) Pain   Flexion 135       Extension                         Treatment:  Therapeutic Exercise  TE 2: 4 ways SLR 2 x 10 reps each direction  TE 4: heel slides with strap10 reps x 10 sec  TE 5: prone knee flexion stretch 10 reps x 10 sec holds  TE 6: recumbent bike end of tx session x 10 min  TE 7: recumbent bike seat 6 x 8 min  Balance/Neuromuscular Re-Education  NMR 1: SL ER clams red TB 2 x 10  NMR 2: quad sets 15 x 5 sec  NMR 3: standing heel raises 2 x 10  Therapeutic Activity  TA 1: bridging 2 x 10  TA 2: sit to stands without UE support 2 x 10  TA 3: lateral stepping red TB 5 steps each way x 5  rounds  TA 4: Forward step ups 10 rep  TA 5: squats to 24 inch box 2 x 10    Time Entry(in minutes):  Neuromuscular Re-Education Time Entry: 10  Therapeutic Activity Time Entry: 20  Therapeutic Exercise Time Entry: 28    Assessment & Plan   Assessment: Patient arrives without new complaints. He reports improvement in L knee discomfort on a daily basis while performing ADL and IADLs. He achieved 135 deg knee flexion with strap today. He was able to perform most exercises without reports of L knee discomfort except for end range knee flexion with strap. He is progressing very well towards goals.   Evaluation/Treatment Tolerance: Patient tolerated treatment well    The patient will continue to benefit from skilled outpatient physical therapy in order to address the deficits listed in the problem list on the initial evaluation, provide patient and family education, and maximize the patients level of independence in the home and community environments.     The patient's spiritual, cultural, and educational needs were considered, and the patient is agreeable to the plan of care and goals.           Plan: Continue POC as indicated. Progress as tolerated.    Goals:   Active       Long term Goals       Patient will navigate stair without handrail with independence to improve community mobility  (Progressing)       Start:  07/21/25    Expected End:  09/01/25            Patient will perform TUG in </= 9 second to improve gait speed  (Progressing)       Start:  07/21/25    Expected End:  09/01/25            - Pt will demonstrate independence with the HEP at discharge. Appropriate and Ongoing  (Progressing)       Start:  07/21/25    Expected End:  09/01/25            - Pt will demonstrate reduced pain and improved functional outcomes as reported on the FOTO by reaching an intake score of >/= 63% functional ability in order to demonstrate subjective improvement in patient's condition. .  (Progressing)       Start:  07/21/25     Expected End:  09/01/25               Short Term Goals       Patient will perform L knee flexion to 130 deg without pain to improve squatting ability  (Met)       Start:  07/21/25    Expected End:  08/04/25    Resolved:  07/29/25         Patient will perform SLR without quad lag to improve functional mobility  (Met)       Start:  07/21/25    Expected End:  08/04/25    Resolved:  07/29/25         Patient will perform 5xSTS in </= 11 second to demonstrate improvement in functional LE strength  (Progressing)       Start:  07/21/25    Expected End:  08/04/25            Patient will report >/= 55/100 on FOTO to improve return to PLOF (Progressing)       Start:  07/21/25    Expected End:  08/04/25                Kenyetta Reyes, PT

## 2025-08-01 ENCOUNTER — CLINICAL SUPPORT (OUTPATIENT)
Dept: REHABILITATION | Facility: HOSPITAL | Age: 85
End: 2025-08-01
Payer: MEDICARE

## 2025-08-01 DIAGNOSIS — M25.662 DECREASED RANGE OF MOTION OF LEFT KNEE: Primary | ICD-10-CM

## 2025-08-01 DIAGNOSIS — R29.898 DECREASED STRENGTH OF LOWER EXTREMITY: ICD-10-CM

## 2025-08-01 DIAGNOSIS — Z74.09 IMPAIRED FUNCTIONAL MOBILITY AND ACTIVITY TOLERANCE: ICD-10-CM

## 2025-08-01 PROCEDURE — 97110 THERAPEUTIC EXERCISES: CPT | Mod: PN,CQ

## 2025-08-01 PROCEDURE — 97530 THERAPEUTIC ACTIVITIES: CPT | Mod: PN,CQ

## 2025-08-01 PROCEDURE — 97112 NEUROMUSCULAR REEDUCATION: CPT | Mod: PN,CQ

## 2025-08-01 NOTE — PROGRESS NOTES
Physical Therapy Visit    Patient Name: Tha Guzman  MRN: [id number]  YOB: 1940  Encounter Date: 8/1/2025    Therapy Diagnosis:   Encounter Diagnoses   Name Primary?    Decreased range of motion of left knee Yes    Decreased strength of lower extremity     Impaired functional mobility and activity tolerance      Physician: Jose Guadalupe Cardoza,*    Physician Orders: Eval and Treat  Medical Diagnosis: Primary osteoarthritis of left knee  Surgical Diagnosis: L knee replacement   Surgical Date: 6/24/2025  Days Since Last Surgery: 38    Visit # / Visits Authorized:  2 / 10  Insurance Authorization Period: 7/16/2025 to 10/10/2025  Date of Evaluation: 7/21/2025  Plan of Care Certification: 7/21/2025 to 10/18/2025      PT/PTA: PTA   Number of PTA visits since last PT visit:1  Time In: 0700   Time Out: 0754  Total Time (in minutes): 54   Total Billable Time (in minutes): 54    FOTO:  Intake Score (%): 44  Survey Score 2 (%): Not applicable for this Episode  Survey Score 3 (%): Not applicable for this Episode    Precautions:         Subjective   Pt states he is not really having any pain today.    L knee    Objective            Treatment:   Therapeutic Exercise - 20 minutes    recumbent bike x 10 min, level 3  4 ways Straight leg raise  2 x 10 reps each direction  heel slides with strap x 20 reps with 10 seconds hold  prone knee flexion stretch x 10 reps with 10 seconds holds    Balance/Neuromuscular Re-Education - 11 minutes  SL external rotation clams red TB 2 x 10 reps   quad sets x 20 reps x 5 seconds   Long arc quad 2# x 20 reps   bridging 2 x 10 reps     Therapeutic Activity - 23 minutes  sit to stands without upper extremity support 2 x 10 reps   lateral stepping red TB 5 steps each way x 5 rounds  Forward step ups x 10 rep  squats to 24 inch box 2 x 10 reps   Standing Abduction 2# x 20 reps each   Standing extension 2# x 20 reps each  standing heel raises 2 x 10 reps     Time Entry(in  minutes):  Neuromuscular Re-Education Time Entry: 11  Therapeutic Activity Time Entry: 23  Therapeutic Exercise Time Entry: 20    Assessment & Plan   Assessment: Patient arrived with  no pain to report thi morning.  He continued with his exercise program.  Today Long arc quad with 2# weight was added as well as added weight with hip Abduction and extension in standing.  He did well and had no difficulty with the exercises.    Evaluation/Treatment Tolerance: Patient tolerated treatment well    The patient will continue to benefit from skilled outpatient physical therapy in order to address the deficits listed in the problem list on the initial evaluation, provide patient and family education, and maximize the patients level of independence in the home and community environments.     The patient's spiritual, cultural, and educational needs were considered, and the patient is agreeable to the plan of care and goals.           Plan: Continue POC as indicated. Progress as tolerated.    Goals:   Active       Long term Goals       Patient will navigate stair without handrail with independence to improve community mobility  (Progressing)       Start:  07/21/25    Expected End:  09/01/25            Patient will perform TUG in </= 9 second to improve gait speed  (Progressing)       Start:  07/21/25    Expected End:  09/01/25            - Pt will demonstrate independence with the HEP at discharge. Appropriate and Ongoing  (Progressing)       Start:  07/21/25    Expected End:  09/01/25            - Pt will demonstrate reduced pain and improved functional outcomes as reported on the FOTO by reaching an intake score of >/= 63% functional ability in order to demonstrate subjective improvement in patient's condition. .  (Progressing)       Start:  07/21/25    Expected End:  09/01/25               Short Term Goals       Patient will perform L knee flexion to 130 deg without pain to improve squatting ability  (Met)       Start:   07/21/25    Expected End:  08/04/25    Resolved:  07/29/25         Patient will perform SLR without quad lag to improve functional mobility  (Met)       Start:  07/21/25    Expected End:  08/04/25    Resolved:  07/29/25         Patient will perform 5xSTS in </= 11 second to demonstrate improvement in functional LE strength  (Progressing)       Start:  07/21/25    Expected End:  08/04/25            Patient will report >/= 55/100 on FOTO to improve return to PLOF (Progressing)       Start:  07/21/25    Expected End:  08/04/25                Eun Caruso, PTA

## 2025-08-05 ENCOUNTER — CLINICAL SUPPORT (OUTPATIENT)
Dept: REHABILITATION | Facility: HOSPITAL | Age: 85
End: 2025-08-05
Payer: MEDICARE

## 2025-08-05 DIAGNOSIS — Z74.09 IMPAIRED FUNCTIONAL MOBILITY AND ACTIVITY TOLERANCE: ICD-10-CM

## 2025-08-05 DIAGNOSIS — M25.662 DECREASED RANGE OF MOTION OF LEFT KNEE: Primary | ICD-10-CM

## 2025-08-05 DIAGNOSIS — R29.898 DECREASED STRENGTH OF LOWER EXTREMITY: ICD-10-CM

## 2025-08-05 PROCEDURE — 97530 THERAPEUTIC ACTIVITIES: CPT | Mod: PN,CQ

## 2025-08-05 PROCEDURE — 97110 THERAPEUTIC EXERCISES: CPT | Mod: PN,CQ

## 2025-08-05 PROCEDURE — 97112 NEUROMUSCULAR REEDUCATION: CPT | Mod: PN,CQ

## 2025-08-05 NOTE — PROGRESS NOTES
"  Outpatient Rehab    Physical Therapy Visit    Patient Name: Tha Guzman  MRN: [id number]  YOB: 1940  Encounter Date: 8/5/2025    Therapy Diagnosis:   Encounter Diagnoses   Name Primary?    Decreased range of motion of left knee Yes    Decreased strength of lower extremity     Impaired functional mobility and activity tolerance      Physician: Jose Guadalupe Cardoza,*    Physician Orders: Eval and Treat  Medical Diagnosis: Primary osteoarthritis of left knee  Surgical Diagnosis: L knee replacement   Surgical Date: 6/24/2025  Days Since Last Surgery: 42    Visit # / Visits Authorized:  3 / 10  Insurance Authorization Period: 7/16/2025 to 10/10/2025  Date of Evaluation: 7/21/2025  Plan of Care Certification: 7/21/2025 to 10/18/2025      PT/PTA: PTA   Number of PTA visits since last PT visit:2  Time In: 0701   Time Out: 0757  Total Time (in minutes): 56   Total Billable Time (in minutes): 56    FOTO:  Intake Score (%): 44  Survey Score 2 (%): Not applicable for this Episode  Survey Score 3 (%): Not applicable for this Episode    Precautions:         Subjective   no complaints today.  Pain reported as 0/10.      Objective          Therapeutic Exercise - 15 minutes     Recumbent bike x 10 min, level 3    Slant board gastroc stretch 3 x 30 sec B  Knee flexion stretch on step x 20      Knee extension stretch 5# x 5 minutes    4 ways Straight leg raise  2 x 10 reps each direction   np  heel slides with strap x 20 reps with 10 seconds hold   np  prone knee flexion stretch x 10 reps with 10 seconds holds np     Balance/Neuromuscular Re-Education - 15 minutes    SLR 2# x 20  SAQ 2# x 20  SL external rotation clams red TB 2 x 10 np  quad sets x 20 reps x 5 seconds np  Long arc quad 2# x 20   Bridging 2 x 10     Therapeutic Activity - 25 minutes    Forward Step up 6" x 20  Lateral step up 6" x 20    Sit to stands without upper extremity support, R foot forward 2 x 10  lateral stepping red TB 5 steps each way x 5 " rounds  np      Standing Abduction 2# x 20 B  Standing extension 2# x 20 B  standing heel raises 2 x 10 reps     Time Entry(in minutes):  Neuromuscular Re-Education Time Entry: 15  Therapeutic Activity Time Entry: 25  Therapeutic Exercise Time Entry: 15    Assessment & Plan   Assessment: Tha continues to improve with strength and ROM.  Good tolerance for therex.    Reviewed to perform heel strike with ambulation.  AAROM knee flexion 115 deg.         The patient will continue to benefit from skilled outpatient physical therapy in order to address the deficits listed in the problem list on the initial evaluation, provide patient and family education, and maximize the patients level of independence in the home and community environments.     The patient's spiritual, cultural, and educational needs were considered, and the patient is agreeable to the plan of care and goals.           Plan: Continue POC as indicated. Progress as tolerated.    Goals:   Active       Long term Goals       Patient will navigate stair without handrail with independence to improve community mobility  (Progressing)       Start:  07/21/25    Expected End:  09/01/25            Patient will perform TUG in </= 9 second to improve gait speed  (Progressing)       Start:  07/21/25    Expected End:  09/01/25            - Pt will demonstrate independence with the HEP at discharge. Appropriate and Ongoing  (Progressing)       Start:  07/21/25    Expected End:  09/01/25            - Pt will demonstrate reduced pain and improved functional outcomes as reported on the FOTO by reaching an intake score of >/= 63% functional ability in order to demonstrate subjective improvement in patient's condition. .  (Progressing)       Start:  07/21/25    Expected End:  09/01/25               Short Term Goals       Patient will perform L knee flexion to 130 deg without pain to improve squatting ability  (Met)       Start:  07/21/25    Expected End:  08/04/25     Resolved:  07/29/25         Patient will perform SLR without quad lag to improve functional mobility  (Met)       Start:  07/21/25    Expected End:  08/04/25    Resolved:  07/29/25         Patient will perform 5xSTS in </= 11 second to demonstrate improvement in functional LE strength  (Progressing)       Start:  07/21/25    Expected End:  08/04/25            Patient will report >/= 55/100 on FOTO to improve return to PLOF (Progressing)       Start:  07/21/25    Expected End:  08/04/25                Audra Oneil, PTA

## 2025-08-11 ENCOUNTER — OFFICE VISIT (OUTPATIENT)
Dept: ORTHOPEDICS | Facility: CLINIC | Age: 85
End: 2025-08-11
Payer: MEDICARE

## 2025-08-11 ENCOUNTER — PATIENT MESSAGE (OUTPATIENT)
Dept: ORTHOPEDICS | Facility: CLINIC | Age: 85
End: 2025-08-11

## 2025-08-11 VITALS — RESPIRATION RATE: 16 BRPM

## 2025-08-11 DIAGNOSIS — Z96.652 STATUS POST TOTAL LEFT KNEE REPLACEMENT: Primary | ICD-10-CM

## 2025-08-11 DIAGNOSIS — M17.10 ARTHRITIS OF KNEE: ICD-10-CM

## 2025-08-11 DIAGNOSIS — M17.11 PRIMARY OSTEOARTHRITIS OF RIGHT KNEE: Primary | ICD-10-CM

## 2025-08-11 PROCEDURE — 1160F RVW MEDS BY RX/DR IN RCRD: CPT | Mod: CPTII,S$GLB,, | Performed by: ORTHOPAEDIC SURGERY

## 2025-08-11 PROCEDURE — 1159F MED LIST DOCD IN RCRD: CPT | Mod: CPTII,S$GLB,, | Performed by: ORTHOPAEDIC SURGERY

## 2025-08-11 PROCEDURE — 1126F AMNT PAIN NOTED NONE PRSNT: CPT | Mod: CPTII,S$GLB,, | Performed by: ORTHOPAEDIC SURGERY

## 2025-08-11 PROCEDURE — 99999 PR PBB SHADOW E&M-EST. PATIENT-LVL II: CPT | Mod: PBBFAC,,, | Performed by: ORTHOPAEDIC SURGERY

## 2025-08-11 PROCEDURE — 99024 POSTOP FOLLOW-UP VISIT: CPT | Mod: S$GLB,,, | Performed by: ORTHOPAEDIC SURGERY

## 2025-08-12 DIAGNOSIS — Z01.818 PRE-OP TESTING: ICD-10-CM

## 2025-08-12 DIAGNOSIS — M17.11 PRIMARY OSTEOARTHRITIS OF RIGHT KNEE: Primary | ICD-10-CM

## 2025-08-12 RX ORDER — MUPIROCIN 20 MG/G
OINTMENT TOPICAL
OUTPATIENT
Start: 2025-08-12

## 2025-08-15 ENCOUNTER — CLINICAL SUPPORT (OUTPATIENT)
Dept: REHABILITATION | Facility: HOSPITAL | Age: 85
End: 2025-08-15
Payer: MEDICARE

## 2025-08-15 DIAGNOSIS — R29.898 DECREASED STRENGTH OF LOWER EXTREMITY: ICD-10-CM

## 2025-08-15 DIAGNOSIS — M25.662 DECREASED RANGE OF MOTION OF LEFT KNEE: Primary | ICD-10-CM

## 2025-08-15 DIAGNOSIS — Z74.09 IMPAIRED FUNCTIONAL MOBILITY AND ACTIVITY TOLERANCE: ICD-10-CM

## 2025-08-15 PROCEDURE — 97530 THERAPEUTIC ACTIVITIES: CPT | Mod: PN

## 2025-08-15 PROCEDURE — 97110 THERAPEUTIC EXERCISES: CPT | Mod: PN

## (undated) DEVICE — PADDING WYTEX UNDRCST 6INX4YD

## (undated) DEVICE — BONE PINS (3.2MM X 140MM)
Type: IMPLANTABLE DEVICE | Site: KNEE | Status: NON-FUNCTIONAL
Removed: 2025-06-24

## (undated) DEVICE — ALCOHOL RUBBING 70% ISO 4OZ

## (undated) DEVICE — DRAPE STERI INSTRUMENT 1018

## (undated) DEVICE — BNDG COFLEX FOAM LF2 ST 4X5YD

## (undated) DEVICE — KIT TRIATHLON CR FEM PREP SZ3

## (undated) DEVICE — GLOVE SENSICARE PI GRN 8

## (undated) DEVICE — PAD ABDOMINAL STERILE 8X10IN

## (undated) DEVICE — SUT STRATAFIX PDS 1 CTX 18IN

## (undated) DEVICE — TOGA FLYTE PEEL AWAY XLARGE

## (undated) DEVICE — BLADE SURG CARBON STEEL #10

## (undated) DEVICE — INTERPULSE SET

## (undated) DEVICE — BANDAGE ESMARK ELASTIC ST 6X9

## (undated) DEVICE — ELECTRODE BLD ULTRA 4IN STRL

## (undated) DEVICE — PADDING CAST SPECIALIST 6X4YD

## (undated) DEVICE — TAPE SILK 3IN

## (undated) DEVICE — SOL NORMAL USPCA 0.9%

## (undated) DEVICE — DRESSING MEPILEX 4X10IN

## (undated) DEVICE — TOWEL OR DISP STRL BLUE 4/PK

## (undated) DEVICE — SUT VICRYL PLUS 2-0 CT1 18

## (undated) DEVICE — KIT TRIATHLON TIBIAL SIZER

## (undated) DEVICE — DRAPE STERI U-SHAPED 47X51IN

## (undated) DEVICE — PAD CAST SPECIALIST STRL 6

## (undated) DEVICE — BLADE SAG DUAL 18MMX1.27MMX90M

## (undated) DEVICE — SOL NACL IRR 1000ML BTL

## (undated) DEVICE — MANIFOLD 4 PORT

## (undated) DEVICE — BLADE MAKO NARROW

## (undated) DEVICE — DRESSING GAUZE OIL EMUL 3X8

## (undated) DEVICE — SOL POVIDONE PREP IODINE 4 OZ

## (undated) DEVICE — GOWN TOGA SYS PEELWY ZIP 2 XL

## (undated) DEVICE — BLADE TONGUE DEPRESSOR STRL

## (undated) DEVICE — TOURNIQUET SB QC DP 34X4IN

## (undated) DEVICE — LINER SUCTION 3000CC

## (undated) DEVICE — YANKAUER FLEX NO VENT HI CAP

## (undated) DEVICE — KIT LEG POSITIONER DISPOSABLES

## (undated) DEVICE — NDL SPINAL 18GX3.5 SPINOCAN

## (undated) DEVICE — KIT DRAPE RIO ONE PIECE W/POCK

## (undated) DEVICE — DRAPE INCISE IOBAN 2 23X17IN

## (undated) DEVICE — SPONGE BULKEE II ABSRB 6X6.75

## (undated) DEVICE — BONE PINS (3.2MM X 110MM)
Type: IMPLANTABLE DEVICE | Site: KNEE | Status: NON-FUNCTIONAL
Removed: 2025-06-24

## (undated) DEVICE — SUT STRATAFIX SPRL PS-2 3-0

## (undated) DEVICE — WRAP KNEE ACCU THERM GEL PACK

## (undated) DEVICE — DRAPE ORTH SPLIT 77X108IN

## (undated) DEVICE — BLADE SURG CARBON STEEL SZ11

## (undated) DEVICE — KIT VIZADISC KNEE TRACKING

## (undated) DEVICE — DRAPE THREE-QTR REINF 53X77IN

## (undated) DEVICE — BNDG COFLEX FOAM LF2 ST 6X5YD

## (undated) DEVICE — ADHESIVE DERMABOND ADVANCED

## (undated) DEVICE — PACK CUSTOM UNIV BASIN SLI

## (undated) DEVICE — SYR 50CC LL

## (undated) DEVICE — APPLICATOR CHLORAPREP ORN 26ML

## (undated) DEVICE — KIT TRIATHLON CR TIB PREP SZ4

## (undated) DEVICE — KIT CHECKPOINT MAKO

## (undated) DEVICE — STRAP OR TABLE 5IN X 72IN

## (undated) DEVICE — COVER TABLE 44X90 STERILE

## (undated) DEVICE — DECANTER FLUID TRNSF WHITE 9IN

## (undated) DEVICE — SLEEVE SCD EXPRESS KNEE MEDIUM

## (undated) DEVICE — ELECTRODE MEGADYNE RETURN DUAL

## (undated) DEVICE — PACK EXTREMITY ORTHOMAX

## (undated) DEVICE — CATH URETHRAL RED RUBBER 18FR

## (undated) DEVICE — GLOVE SENSICARE PI ALOE 7.5

## (undated) DEVICE — PENCIL SMK EVAC CONNECTOR 10FT

## (undated) DEVICE — BANDAGE ACE DOUBLE STER 6IN

## (undated) DEVICE — PACK SIRUS BASIC V SURG STRL